# Patient Record
Sex: FEMALE | Race: WHITE | Employment: OTHER | URBAN - NONMETROPOLITAN AREA
[De-identification: names, ages, dates, MRNs, and addresses within clinical notes are randomized per-mention and may not be internally consistent; named-entity substitution may affect disease eponyms.]

---

## 2017-01-01 ENCOUNTER — OFFICE VISIT (OUTPATIENT)
Dept: FAMILY MEDICINE CLINIC | Age: 72
End: 2017-01-01
Payer: MEDICARE

## 2017-01-01 ENCOUNTER — TELEPHONE (OUTPATIENT)
Dept: FAMILY MEDICINE CLINIC | Age: 72
End: 2017-01-01

## 2017-01-01 ENCOUNTER — TELEPHONE (OUTPATIENT)
Dept: NEUROSURGERY | Age: 72
End: 2017-01-01

## 2017-01-01 VITALS
OXYGEN SATURATION: 98 % | BODY MASS INDEX: 35.36 KG/M2 | DIASTOLIC BLOOD PRESSURE: 66 MMHG | RESPIRATION RATE: 16 BRPM | HEART RATE: 78 BPM | SYSTOLIC BLOOD PRESSURE: 120 MMHG | HEIGHT: 66 IN | WEIGHT: 220 LBS | TEMPERATURE: 98.6 F

## 2017-01-01 VITALS
SYSTOLIC BLOOD PRESSURE: 134 MMHG | OXYGEN SATURATION: 97 % | HEART RATE: 70 BPM | RESPIRATION RATE: 16 BRPM | DIASTOLIC BLOOD PRESSURE: 60 MMHG | BODY MASS INDEX: 35.36 KG/M2 | HEIGHT: 66 IN | WEIGHT: 220 LBS | TEMPERATURE: 97.8 F

## 2017-01-01 VITALS
BODY MASS INDEX: 35.26 KG/M2 | SYSTOLIC BLOOD PRESSURE: 110 MMHG | RESPIRATION RATE: 16 BRPM | OXYGEN SATURATION: 98 % | HEIGHT: 66 IN | DIASTOLIC BLOOD PRESSURE: 64 MMHG | TEMPERATURE: 98.8 F | HEART RATE: 67 BPM | WEIGHT: 219.4 LBS

## 2017-01-01 VITALS
WEIGHT: 220 LBS | OXYGEN SATURATION: 98 % | DIASTOLIC BLOOD PRESSURE: 66 MMHG | SYSTOLIC BLOOD PRESSURE: 138 MMHG | HEART RATE: 70 BPM | RESPIRATION RATE: 16 BRPM | HEIGHT: 66 IN | BODY MASS INDEX: 35.36 KG/M2

## 2017-01-01 VITALS
HEART RATE: 69 BPM | TEMPERATURE: 98.7 F | RESPIRATION RATE: 16 BRPM | OXYGEN SATURATION: 97 % | HEIGHT: 66 IN | WEIGHT: 219 LBS | DIASTOLIC BLOOD PRESSURE: 74 MMHG | SYSTOLIC BLOOD PRESSURE: 132 MMHG | BODY MASS INDEX: 35.2 KG/M2

## 2017-01-01 VITALS
WEIGHT: 230 LBS | TEMPERATURE: 98 F | HEART RATE: 85 BPM | SYSTOLIC BLOOD PRESSURE: 124 MMHG | BODY MASS INDEX: 37.12 KG/M2 | DIASTOLIC BLOOD PRESSURE: 80 MMHG | OXYGEN SATURATION: 99 %

## 2017-01-01 DIAGNOSIS — E78.5 HYPERLIPIDEMIA, UNSPECIFIED HYPERLIPIDEMIA TYPE: ICD-10-CM

## 2017-01-01 DIAGNOSIS — F03.90 DEMENTIA WITHOUT BEHAVIORAL DISTURBANCE, UNSPECIFIED DEMENTIA TYPE: ICD-10-CM

## 2017-01-01 DIAGNOSIS — N30.00 ACUTE CYSTITIS WITHOUT HEMATURIA: Primary | ICD-10-CM

## 2017-01-01 DIAGNOSIS — F03.90 DEMENTIA WITHOUT BEHAVIORAL DISTURBANCE, UNSPECIFIED DEMENTIA TYPE: Primary | ICD-10-CM

## 2017-01-01 DIAGNOSIS — R41.0 CONFUSION: ICD-10-CM

## 2017-01-01 DIAGNOSIS — Z79.4 TYPE 2 DIABETES MELLITUS WITHOUT COMPLICATION, WITH LONG-TERM CURRENT USE OF INSULIN (HCC): ICD-10-CM

## 2017-01-01 DIAGNOSIS — R30.9 PAINFUL URINATION: Primary | ICD-10-CM

## 2017-01-01 DIAGNOSIS — R30.0 DYSURIA: ICD-10-CM

## 2017-01-01 DIAGNOSIS — G31.84 MILD COGNITIVE IMPAIRMENT WITH MEMORY LOSS: Primary | ICD-10-CM

## 2017-01-01 DIAGNOSIS — Z79.4 TYPE 2 DIABETES MELLITUS WITHOUT COMPLICATION, WITH LONG-TERM CURRENT USE OF INSULIN (HCC): Primary | ICD-10-CM

## 2017-01-01 DIAGNOSIS — F32.4 MAJOR DEPRESSIVE DISORDER WITH SINGLE EPISODE, IN PARTIAL REMISSION (HCC): ICD-10-CM

## 2017-01-01 DIAGNOSIS — E11.9 TYPE 2 DIABETES MELLITUS WITHOUT COMPLICATION, WITHOUT LONG-TERM CURRENT USE OF INSULIN (HCC): ICD-10-CM

## 2017-01-01 DIAGNOSIS — F32.A DEPRESSION, UNSPECIFIED DEPRESSION TYPE: ICD-10-CM

## 2017-01-01 DIAGNOSIS — R41.3 MEMORY LOSS: Primary | ICD-10-CM

## 2017-01-01 DIAGNOSIS — Z23 NEED FOR PROPHYLACTIC VACCINATION AGAINST STREPTOCOCCUS PNEUMONIAE (PNEUMOCOCCUS): ICD-10-CM

## 2017-01-01 DIAGNOSIS — N30.01 ACUTE CYSTITIS WITH HEMATURIA: Primary | ICD-10-CM

## 2017-01-01 DIAGNOSIS — E11.9 TYPE 2 DIABETES MELLITUS WITHOUT COMPLICATION, WITH LONG-TERM CURRENT USE OF INSULIN (HCC): ICD-10-CM

## 2017-01-01 DIAGNOSIS — N39.0 FREQUENT UTI: ICD-10-CM

## 2017-01-01 DIAGNOSIS — E11.9 TYPE 2 DIABETES MELLITUS WITHOUT COMPLICATION, WITH LONG-TERM CURRENT USE OF INSULIN (HCC): Primary | ICD-10-CM

## 2017-01-01 DIAGNOSIS — N30.00 ACUTE CYSTITIS WITHOUT HEMATURIA: ICD-10-CM

## 2017-01-01 DIAGNOSIS — I10 ESSENTIAL HYPERTENSION: ICD-10-CM

## 2017-01-01 LAB
ALBUMIN SERPL-MCNC: 3.8 G/DL (ref 3.5–5.2)
ALP BLD-CCNC: 144 U/L (ref 35–104)
ALT SERPL-CCNC: 13 U/L (ref 5–33)
ANION GAP SERPL CALCULATED.3IONS-SCNC: 14 MMOL/L (ref 7–19)
AST SERPL-CCNC: 33 U/L (ref 5–32)
BACTERIA: ABNORMAL /HPF
BASOPHILS ABSOLUTE: 0.1 K/UL (ref 0–0.2)
BASOPHILS RELATIVE PERCENT: 0.5 % (ref 0–1)
BILIRUB SERPL-MCNC: 0.3 MG/DL (ref 0.2–1.2)
BILIRUBIN URINE: NEGATIVE
BLOOD, URINE: ABNORMAL
BUN BLDV-MCNC: 27 MG/DL (ref 8–23)
CALCIUM SERPL-MCNC: 9.9 MG/DL (ref 8.8–10.2)
CHLORIDE BLD-SCNC: 98 MMOL/L (ref 98–111)
CHOLESTEROL, TOTAL: 253 MG/DL (ref 160–199)
CLARITY: ABNORMAL
CLARITY: ABNORMAL
CLARITY: CLEAR
CO2: 29 MMOL/L (ref 22–29)
COLOR: ABNORMAL
COLOR: YELLOW
CREAT SERPL-MCNC: 1.1 MG/DL (ref 0.5–0.9)
CREATININE URINE: 79.3 MG/DL (ref 4.2–622)
EOSINOPHILS ABSOLUTE: 0.4 K/UL (ref 0–0.6)
EOSINOPHILS RELATIVE PERCENT: 2.2 % (ref 0–5)
EPITHELIAL CELLS, UA: 1 /HPF (ref 0–5)
GFR NON-AFRICAN AMERICAN: 49
GLUCOSE BLD-MCNC: 266 MG/DL (ref 74–109)
GLUCOSE URINE: NEGATIVE MG/DL
HBA1C MFR BLD: 8.6 %
HCT VFR BLD CALC: 41.8 % (ref 37–47)
HDLC SERPL-MCNC: 41 MG/DL (ref 65–121)
HEMOGLOBIN: 12.4 G/DL (ref 12–16)
HYALINE CASTS: 10 /HPF (ref 0–8)
HYALINE CASTS: 3 /HPF (ref 0–8)
HYALINE CASTS: 4 /HPF (ref 0–8)
HYALINE CASTS: 5 /HPF (ref 0–8)
KETONES, URINE: NEGATIVE MG/DL
LDL CHOLESTEROL CALCULATED: 133 MG/DL
LEUKOCYTE ESTERASE, URINE: ABNORMAL
LYMPHOCYTES ABSOLUTE: 6.8 K/UL (ref 1.1–4.5)
LYMPHOCYTES RELATIVE PERCENT: 41.9 % (ref 20–40)
MCH RBC QN AUTO: 28.6 PG (ref 27–31)
MCHC RBC AUTO-ENTMCNC: 29.7 G/DL (ref 33–37)
MCV RBC AUTO: 96.3 FL (ref 81–99)
MICROALBUMIN UR-MCNC: 21.7 MG/DL (ref 0–19)
MICROALBUMIN/CREAT UR-RTO: 273.6 MG/G
MONOCYTES ABSOLUTE: 1.1 K/UL (ref 0–0.9)
MONOCYTES RELATIVE PERCENT: 6.6 % (ref 0–10)
NEUTROPHILS ABSOLUTE: 7.8 K/UL (ref 1.5–7.5)
NEUTROPHILS RELATIVE PERCENT: 47.9 % (ref 50–65)
NITRITE, URINE: POSITIVE
ORGANISM: ABNORMAL
PDW BLD-RTO: 15.2 % (ref 11.5–14.5)
PH UA: 5.5
PH UA: 5.5
PH UA: 6.5
PH UA: 6.5
PH UA: 7
PLATELET # BLD: 580 K/UL (ref 130–400)
PMV BLD AUTO: 11.4 FL (ref 9.4–12.3)
POTASSIUM SERPL-SCNC: 4.4 MMOL/L (ref 3.5–5)
PROTEIN UA: 100 MG/DL
PROTEIN UA: 30 MG/DL
PROTEIN UA: 30 MG/DL
PROTEIN UA: NEGATIVE MG/DL
PROTEIN UA: NEGATIVE MG/DL
RBC # BLD: 4.34 M/UL (ref 4.2–5.4)
RBC UA: 3 /HPF (ref 0–4)
SODIUM BLD-SCNC: 141 MMOL/L (ref 136–145)
SPECIFIC GRAVITY UA: 1.01
SPECIFIC GRAVITY UA: 1.02
SPECIFIC GRAVITY UA: 1.02
TOTAL PROTEIN: 7.6 G/DL (ref 6.6–8.7)
TRIGL SERPL-MCNC: 397 MG/DL (ref 0–149)
URINE CULTURE, ROUTINE: ABNORMAL
URINE TYPE: ABNORMAL
UROBILINOGEN, URINE: 0.2 E.U./DL
UROBILINOGEN, URINE: 1 E.U./DL
UROBILINOGEN, URINE: 1 E.U./DL
WBC # BLD: 16.2 K/UL (ref 4.8–10.8)
WBC UA: 20 /HPF (ref 0–5)
WBC UA: 204 /HPF (ref 0–5)
WBC UA: 76 /HPF (ref 0–5)
WBC UA: 94 /HPF (ref 0–5)

## 2017-01-01 PROCEDURE — G8417 CALC BMI ABV UP PARAM F/U: HCPCS | Performed by: FAMILY MEDICINE

## 2017-01-01 PROCEDURE — 1036F TOBACCO NON-USER: CPT | Performed by: FAMILY MEDICINE

## 2017-01-01 PROCEDURE — 1090F PRES/ABSN URINE INCON ASSESS: CPT | Performed by: FAMILY MEDICINE

## 2017-01-01 PROCEDURE — 1123F ACP DISCUSS/DSCN MKR DOCD: CPT | Performed by: FAMILY MEDICINE

## 2017-01-01 PROCEDURE — G8484 FLU IMMUNIZE NO ADMIN: HCPCS | Performed by: FAMILY MEDICINE

## 2017-01-01 PROCEDURE — 3045F PR MOST RECENT HEMOGLOBIN A1C LEVEL 7.0-9.0%: CPT | Performed by: FAMILY MEDICINE

## 2017-01-01 PROCEDURE — 3014F SCREEN MAMMO DOC REV: CPT | Performed by: FAMILY MEDICINE

## 2017-01-01 PROCEDURE — 99213 OFFICE O/P EST LOW 20 MIN: CPT | Performed by: FAMILY MEDICINE

## 2017-01-01 PROCEDURE — 99214 OFFICE O/P EST MOD 30 MIN: CPT | Performed by: FAMILY MEDICINE

## 2017-01-01 PROCEDURE — 99215 OFFICE O/P EST HI 40 MIN: CPT | Performed by: FAMILY MEDICINE

## 2017-01-01 PROCEDURE — G0009 ADMIN PNEUMOCOCCAL VACCINE: HCPCS | Performed by: FAMILY MEDICINE

## 2017-01-01 PROCEDURE — 3046F HEMOGLOBIN A1C LEVEL >9.0%: CPT | Performed by: FAMILY MEDICINE

## 2017-01-01 PROCEDURE — 3017F COLORECTAL CA SCREEN DOC REV: CPT | Performed by: FAMILY MEDICINE

## 2017-01-01 PROCEDURE — G8427 DOCREV CUR MEDS BY ELIG CLIN: HCPCS | Performed by: FAMILY MEDICINE

## 2017-01-01 PROCEDURE — G8400 PT W/DXA NO RESULTS DOC: HCPCS | Performed by: FAMILY MEDICINE

## 2017-01-01 PROCEDURE — 4040F PNEUMOC VAC/ADMIN/RCVD: CPT | Performed by: FAMILY MEDICINE

## 2017-01-01 PROCEDURE — 90732 PPSV23 VACC 2 YRS+ SUBQ/IM: CPT | Performed by: FAMILY MEDICINE

## 2017-01-01 RX ORDER — GRANULES FOR ORAL 3 G/1
3 POWDER ORAL ONCE
Qty: 1 EACH | Refills: 0 | Status: SHIPPED | OUTPATIENT
Start: 2017-01-01 | End: 2017-01-01

## 2017-01-01 RX ORDER — NITROFURANTOIN 25; 75 MG/1; MG/1
100 CAPSULE ORAL 2 TIMES DAILY
Qty: 20 CAPSULE | Refills: 0 | Status: SHIPPED | OUTPATIENT
Start: 2017-01-01 | End: 2017-01-01

## 2017-01-01 RX ORDER — NITROFURANTOIN 25; 75 MG/1; MG/1
100 CAPSULE ORAL 2 TIMES DAILY
Qty: 14 CAPSULE | Refills: 0 | Status: SHIPPED | OUTPATIENT
Start: 2017-01-01 | End: 2017-01-01

## 2017-01-01 RX ORDER — SIMVASTATIN 40 MG
40 TABLET ORAL DAILY
Qty: 90 TABLET | Refills: 1 | Status: SHIPPED | OUTPATIENT
Start: 2017-01-01 | End: 2018-01-01 | Stop reason: SDUPTHER

## 2017-01-01 RX ORDER — DONEPEZIL HYDROCHLORIDE 10 MG/1
10 TABLET, FILM COATED ORAL NIGHTLY
Qty: 90 TABLET | Refills: 1 | Status: SHIPPED | OUTPATIENT
Start: 2017-01-01

## 2017-01-01 RX ORDER — FENOFIBRIC ACID 135 MG/1
CAPSULE, DELAYED RELEASE ORAL
Refills: 2 | COMMUNITY
Start: 2017-01-01

## 2017-01-01 RX ORDER — MEMANTINE HYDROCHLORIDE 10 MG/1
TABLET ORAL
Qty: 60 TABLET | Refills: 2 | Status: SHIPPED | OUTPATIENT
Start: 2017-01-01 | End: 2018-01-01 | Stop reason: SDUPTHER

## 2017-01-01 RX ORDER — DONEPEZIL HYDROCHLORIDE 10 MG/1
10 TABLET, FILM COATED ORAL NIGHTLY
Qty: 30 TABLET | Refills: 2 | Status: SHIPPED | OUTPATIENT
Start: 2017-01-01 | End: 2017-01-01 | Stop reason: SDUPTHER

## 2017-01-01 RX ORDER — INSULIN GLARGINE 100 [IU]/ML
INJECTION, SOLUTION SUBCUTANEOUS
COMMUNITY
Start: 2017-01-01 | End: 2017-01-01 | Stop reason: SDUPTHER

## 2017-01-01 RX ORDER — CIPROFLOXACIN 250 MG/1
500 TABLET, FILM COATED ORAL 2 TIMES DAILY
Qty: 14 TABLET | Refills: 0 | Status: SHIPPED | OUTPATIENT
Start: 2017-01-01 | End: 2017-01-01

## 2017-01-01 RX ORDER — SIMVASTATIN 40 MG
40 TABLET ORAL DAILY
Qty: 30 TABLET | Refills: 2 | Status: SHIPPED | OUTPATIENT
Start: 2017-01-01 | End: 2017-01-01 | Stop reason: SDUPTHER

## 2017-01-01 RX ORDER — ESCITALOPRAM OXALATE 20 MG/1
20 TABLET ORAL DAILY
Qty: 30 TABLET | Refills: 2 | Status: SHIPPED | OUTPATIENT
Start: 2017-01-01 | End: 2018-01-01 | Stop reason: SDUPTHER

## 2017-01-01 RX ORDER — DONEPEZIL HYDROCHLORIDE 5 MG/1
5 TABLET, FILM COATED ORAL NIGHTLY
Qty: 30 TABLET | Refills: 2 | Status: SHIPPED | OUTPATIENT
Start: 2017-01-01 | End: 2017-01-01 | Stop reason: SDUPTHER

## 2017-01-01 RX ORDER — GRANULES FOR ORAL 3 G/1
3 POWDER ORAL ONCE
Qty: 1 EACH | Refills: 0 | Status: SHIPPED | OUTPATIENT
Start: 2017-01-01 | End: 2017-01-01 | Stop reason: SDUPTHER

## 2017-01-01 RX ORDER — ESCITALOPRAM OXALATE 10 MG/1
10 TABLET ORAL DAILY
COMMUNITY
End: 2017-01-01 | Stop reason: SDUPTHER

## 2017-01-01 RX ORDER — INSULIN GLARGINE 100 [IU]/ML
40 INJECTION, SOLUTION SUBCUTANEOUS NIGHTLY
Qty: 5 PEN | Refills: 2 | Status: SHIPPED | OUTPATIENT
Start: 2017-01-01 | End: 2018-01-01 | Stop reason: SDUPTHER

## 2017-01-01 ASSESSMENT — ENCOUNTER SYMPTOMS
COUGH: 0
CONSTIPATION: 0
VOMITING: 0
BACK PAIN: 0
WHEEZING: 0
WHEEZING: 0
EYE PAIN: 0
CONSTIPATION: 0
BACK PAIN: 0
EYE DISCHARGE: 0
ABDOMINAL PAIN: 0
RHINORRHEA: 0
SORE THROAT: 0
ABDOMINAL PAIN: 0
EYE DISCHARGE: 0
COUGH: 0
SHORTNESS OF BREATH: 0
EYE PAIN: 0
VOMITING: 0
EYE DISCHARGE: 0
NAUSEA: 0
RHINORRHEA: 0
VOMITING: 0
SORE THROAT: 0
NAUSEA: 0
NAUSEA: 0
DIARRHEA: 0
VOMITING: 0
WHEEZING: 0
EYE PAIN: 0
CONSTIPATION: 0
SHORTNESS OF BREATH: 0
RHINORRHEA: 0
WHEEZING: 0
RHINORRHEA: 0
NAUSEA: 0
SHORTNESS OF BREATH: 0
ABDOMINAL PAIN: 0
SHORTNESS OF BREATH: 0
SORE THROAT: 0
DIARRHEA: 0
DIARRHEA: 0
CONSTIPATION: 0
BACK PAIN: 0
VOMITING: 0
SHORTNESS OF BREATH: 0
COUGH: 0
RHINORRHEA: 0
ABDOMINAL PAIN: 0
ABDOMINAL PAIN: 0
EYE DISCHARGE: 0
SORE THROAT: 0
SHORTNESS OF BREATH: 0
EYE DISCHARGE: 0
EYE DISCHARGE: 0
RHINORRHEA: 0
BACK PAIN: 0
EYE PAIN: 0
COUGH: 0
DIARRHEA: 0
NAUSEA: 0
VOMITING: 0
DIARRHEA: 0
EYE PAIN: 0
CONSTIPATION: 0
CONSTIPATION: 0
SORE THROAT: 0
BACK PAIN: 0
EYE PAIN: 0
WHEEZING: 0
BACK PAIN: 0
SORE THROAT: 0
ABDOMINAL PAIN: 0
COUGH: 0
NAUSEA: 0
WHEEZING: 0
DIARRHEA: 0
COUGH: 0

## 2017-01-03 ENCOUNTER — HOSPITAL ENCOUNTER (OUTPATIENT)
Dept: NON INVASIVE DIAGNOSTICS | Age: 72
Discharge: HOME | End: 2017-01-03
Payer: MEDICARE

## 2017-01-03 ENCOUNTER — OFFICE VISIT (OUTPATIENT)
Dept: VASCULAR SURGERY | Age: 72
End: 2017-01-03
Payer: MEDICARE

## 2017-01-03 VITALS
BODY MASS INDEX: 36.96 KG/M2 | HEIGHT: 66 IN | DIASTOLIC BLOOD PRESSURE: 68 MMHG | WEIGHT: 230 LBS | RESPIRATION RATE: 18 BRPM | TEMPERATURE: 97.8 F | SYSTOLIC BLOOD PRESSURE: 128 MMHG | HEART RATE: 80 BPM

## 2017-01-03 DIAGNOSIS — M79.89 LEG SWELLING: ICD-10-CM

## 2017-01-03 DIAGNOSIS — I83.893 VARICOSE VEINS OF BOTH LEGS WITH EDEMA: Primary | ICD-10-CM

## 2017-01-03 DIAGNOSIS — M79.604 PAIN OF RIGHT LOWER EXTREMITY: Primary | ICD-10-CM

## 2017-01-03 DIAGNOSIS — M79.604 RIGHT LEG PAIN: ICD-10-CM

## 2017-01-03 PROCEDURE — 1036F TOBACCO NON-USER: CPT | Performed by: PHYSICIAN ASSISTANT

## 2017-01-03 PROCEDURE — 3017F COLORECTAL CA SCREEN DOC REV: CPT | Performed by: PHYSICIAN ASSISTANT

## 2017-01-03 PROCEDURE — 4040F PNEUMOC VAC/ADMIN/RCVD: CPT | Performed by: PHYSICIAN ASSISTANT

## 2017-01-03 PROCEDURE — 3014F SCREEN MAMMO DOC REV: CPT | Performed by: PHYSICIAN ASSISTANT

## 2017-01-03 PROCEDURE — G8427 DOCREV CUR MEDS BY ELIG CLIN: HCPCS | Performed by: PHYSICIAN ASSISTANT

## 2017-01-03 PROCEDURE — G8419 CALC BMI OUT NRM PARAM NOF/U: HCPCS | Performed by: PHYSICIAN ASSISTANT

## 2017-01-03 PROCEDURE — 99203 OFFICE O/P NEW LOW 30 MIN: CPT | Performed by: PHYSICIAN ASSISTANT

## 2017-01-03 PROCEDURE — 93971 EXTREMITY STUDY: CPT

## 2017-01-03 PROCEDURE — 1123F ACP DISCUSS/DSCN MKR DOCD: CPT | Performed by: PHYSICIAN ASSISTANT

## 2017-01-03 PROCEDURE — G8400 PT W/DXA NO RESULTS DOC: HCPCS | Performed by: PHYSICIAN ASSISTANT

## 2017-01-03 PROCEDURE — 1090F PRES/ABSN URINE INCON ASSESS: CPT | Performed by: PHYSICIAN ASSISTANT

## 2017-01-03 PROCEDURE — G8484 FLU IMMUNIZE NO ADMIN: HCPCS | Performed by: PHYSICIAN ASSISTANT

## 2017-01-03 RX ORDER — FUROSEMIDE 20 MG/1
20 TABLET ORAL DAILY
COMMUNITY
Start: 2016-12-29

## 2017-01-03 RX ORDER — METOPROLOL SUCCINATE 50 MG/1
50 TABLET, EXTENDED RELEASE ORAL DAILY
COMMUNITY

## 2017-01-03 RX ORDER — ESOMEPRAZOLE MAGNESIUM 20 MG/1
20 FOR SUSPENSION ORAL 2 TIMES DAILY
COMMUNITY

## 2017-01-03 RX ORDER — FENOFIBRATE 134 MG/1
134 CAPSULE ORAL NIGHTLY
COMMUNITY
End: 2017-01-01

## 2017-01-03 RX ORDER — LOSARTAN POTASSIUM 100 MG/1
100 TABLET ORAL DAILY
COMMUNITY

## 2017-01-03 RX ORDER — SIMVASTATIN 40 MG
40 TABLET ORAL DAILY
COMMUNITY
End: 2017-01-01 | Stop reason: ALTCHOICE

## 2017-01-03 RX ORDER — HYDROCODONE BITARTRATE AND ACETAMINOPHEN 10; 325 MG/1; MG/1
1 TABLET ORAL EVERY 8 HOURS PRN
COMMUNITY
End: 2017-01-01 | Stop reason: ALTCHOICE

## 2017-01-03 RX ORDER — HYDROCHLOROTHIAZIDE 12.5 MG/1
12.5 CAPSULE, GELATIN COATED ORAL DAILY
COMMUNITY

## 2017-02-23 ENCOUNTER — LAB (OUTPATIENT)
Dept: LAB | Facility: HOSPITAL | Age: 72
End: 2017-02-23

## 2017-02-23 ENCOUNTER — OFFICE VISIT (OUTPATIENT)
Dept: OTOLARYNGOLOGY | Facility: CLINIC | Age: 72
End: 2017-02-23

## 2017-02-23 VITALS
BODY MASS INDEX: 38.49 KG/M2 | WEIGHT: 231 LBS | TEMPERATURE: 98.3 F | DIASTOLIC BLOOD PRESSURE: 76 MMHG | HEART RATE: 75 BPM | SYSTOLIC BLOOD PRESSURE: 171 MMHG | HEIGHT: 65 IN

## 2017-02-23 DIAGNOSIS — E04.1 THYROID NODULE: Primary | ICD-10-CM

## 2017-02-23 DIAGNOSIS — E04.2 MULTIPLE THYROID NODULES: Primary | ICD-10-CM

## 2017-02-23 DIAGNOSIS — E04.1 THYROID NODULE: ICD-10-CM

## 2017-02-23 DIAGNOSIS — K21.9 LARYNGOPHARYNGEAL REFLUX (LPR): ICD-10-CM

## 2017-02-23 LAB
T3FREE SERPL-MCNC: 3.2 PG/ML (ref 2.77–5.27)
TSH SERPL DL<=0.05 MIU/L-ACNC: 2.06 MIU/ML (ref 0.47–4.68)

## 2017-02-23 PROCEDURE — 84443 ASSAY THYROID STIM HORMONE: CPT | Performed by: NURSE PRACTITIONER

## 2017-02-23 PROCEDURE — 84445 ASSAY OF TSI GLOBULIN: CPT | Performed by: NURSE PRACTITIONER

## 2017-02-23 PROCEDURE — 36415 COLL VENOUS BLD VENIPUNCTURE: CPT

## 2017-02-23 PROCEDURE — 84436 ASSAY OF TOTAL THYROXINE: CPT | Performed by: NURSE PRACTITIONER

## 2017-02-23 PROCEDURE — 99213 OFFICE O/P EST LOW 20 MIN: CPT | Performed by: NURSE PRACTITIONER

## 2017-02-23 PROCEDURE — 86376 MICROSOMAL ANTIBODY EACH: CPT | Performed by: NURSE PRACTITIONER

## 2017-02-23 PROCEDURE — 84481 FREE ASSAY (FT-3): CPT | Performed by: NURSE PRACTITIONER

## 2017-02-23 PROCEDURE — 76536 US EXAM OF HEAD AND NECK: CPT | Performed by: OTOLARYNGOLOGY

## 2017-02-23 RX ORDER — FUROSEMIDE 20 MG/1
TABLET ORAL
Status: ON HOLD | COMMUNITY
Start: 2016-12-29 | End: 2018-05-05

## 2017-02-23 RX ORDER — METOPROLOL SUCCINATE 50 MG/1
TABLET, EXTENDED RELEASE ORAL
Status: ON HOLD | COMMUNITY
End: 2018-05-05

## 2017-02-23 RX ORDER — SIMVASTATIN 40 MG
TABLET ORAL
COMMUNITY
End: 2017-08-23

## 2017-02-23 RX ORDER — INSULIN LISPRO 100 [IU]/ML
INJECTION, SOLUTION INTRAVENOUS; SUBCUTANEOUS
Refills: 6 | Status: ON HOLD | COMMUNITY
Start: 2017-02-02 | End: 2018-05-05

## 2017-02-23 RX ORDER — HYDROCHLOROTHIAZIDE 12.5 MG/1
CAPSULE, GELATIN COATED ORAL
Status: ON HOLD | COMMUNITY
End: 2018-05-05

## 2017-02-23 RX ORDER — LOSARTAN POTASSIUM 100 MG/1
TABLET ORAL
Status: ON HOLD | COMMUNITY
End: 2018-05-05

## 2017-02-23 RX ORDER — FENOFIBRATE 134 MG/1
CAPSULE ORAL
Status: ON HOLD | COMMUNITY
End: 2018-05-05

## 2017-02-23 RX ORDER — HYDROCODONE BITARTRATE AND ACETAMINOPHEN 10; 325 MG/1; MG/1
TABLET ORAL EVERY 8 HOURS
Status: ON HOLD | COMMUNITY
End: 2018-05-05

## 2017-02-23 RX ORDER — ESOMEPRAZOLE MAGNESIUM 20 MG/1
FOR SUSPENSION ORAL
Status: ON HOLD | COMMUNITY
End: 2018-05-05

## 2017-02-23 RX ORDER — INSULIN GLARGINE 100 [IU]/ML
INJECTION, SOLUTION SUBCUTANEOUS
Refills: 6 | Status: ON HOLD | COMMUNITY
Start: 2017-02-08 | End: 2018-05-05

## 2017-02-23 NOTE — PATIENT INSTRUCTIONS
The patient has a thyroid nodule. I explained the pathology of thyroid nodules including the risks of cancer. The options of surgery versus an observational course were discussed. Recommendation was made for a FINE NEEDLE ASPIRATION of the nodule/mass     Call for problems or worsening symptoms

## 2017-02-23 NOTE — PROGRESS NOTES
YOB: 1945  Location: [facility]  Location Address: [address] [place] [address], [facility], [address] [place] [address]  Location Phone: [phone]    Chief Complaint   Patient presents with   • Thyroid Problem       History of Present Illness  Bindu Fountain is a 71 y.o. female.  Bindu Fountain is here for follow up of ENT complaints. The patient has had problems with thyroid nodules  The symptoms are localized to the bilateral thyroid. The patient has had moderate symptoms. The symptoms have been present for the last several months . . The symptoms are aggravated by  no identifiable factors . The symptoms are improved by no identifieable factors.  Problems with swallowing have resolved. She did not have the thyroid bloodwork drawn             Past Medical History   Diagnosis Date   • Diabetes    • Dysphagia    • High cholesterol    • Hypertension    • Laryngopharyngeal reflux    • Lingual tonsil hypertrophy    • Thyroid nodule        Past Surgical History   Procedure Laterality Date   • Appendectomy     • Cholecystectomy     • Knee surgery     • Splenectomy     • Tonsillectomy           Current Outpatient Prescriptions:   •  esomeprazole (NexIUM) 20 MG packet, Take 20 mg by mouth 2 times daily, Disp: , Rfl:   •  fenofibrate micronized (LOFIBRA) 134 MG capsule, Take 134 mg by mouth nightly, Disp: , Rfl:   •  furosemide (LASIX) 20 MG tablet, 20 mg daily, Disp: , Rfl:   •  HUMALOG KWIKPEN 100 UNIT/ML solution pen-injector, INJECT 35 UNITS 3 TIMES A DAY AS DIRECTED, Disp: , Rfl: 6  •  hydrochlorothiazide (MICROZIDE) 12.5 MG capsule, Take 12.5 mg by mouth daily, Disp: , Rfl:   •  HYDROcodone-acetaminophen (NORCO)  MG per tablet, Every 8 (Eight) Hours., Disp: , Rfl:   •  LANTUS SOLOSTAR 100 UNIT/ML injection pen, INJECT 40 UNITS EVERY MORNING  UNITS EVERY EVENING, Disp: , Rfl: 6  •  losartan (COZAAR) 100 MG tablet, Take 100 mg by mouth daily, Disp: , Rfl:   •  MAGNESIUM OXIDE PO, Take 400 mg by mouth daily,  Disp: , Rfl:   •  metoprolol succinate XL (TOPROL-XL) 50 MG 24 hr tablet, Take 50 mg by mouth daily, Disp: , Rfl:   •  simvastatin (ZOCOR) 40 MG tablet, Take 40 mg by mouth daily, Disp: , Rfl:     Cephalexin; Oxycodone; Sulfa antibiotics; and Sulfamethoxazole-trimethoprim    Family History   Problem Relation Age of Onset   • Cancer Mother    • Heart disease Mother        Social History     Social History   • Marital status:      Spouse name: N/A   • Number of children: N/A   • Years of education: N/A     Occupational History   • Not on file.     Social History Main Topics   • Smoking status: Former Smoker   • Smokeless tobacco: Not on file      Comment: 2012   • Alcohol use No   • Drug use: Defer   • Sexual activity: Not on file     Other Topics Concern   • Not on file     Social History Narrative       Review of Systems   Constitutional: Negative.    HENT:        SEE HPI   Eyes: Negative.    Respiratory: Negative.    Cardiovascular: Negative.    Gastrointestinal: Negative.    Endocrine: Negative.    Genitourinary: Negative.    Musculoskeletal: Negative.    Skin: Negative.    Allergic/Immunologic: Negative.    Neurological: Negative.    Hematological: Negative.    Psychiatric/Behavioral: Negative.        Vitals:    02/23/17 1142   BP: 171/76   Pulse: 75   Temp: 98.3 °F (36.8 °C)       Objective     Physical Exam  CONSTITUTIONAL: well nourished, alert, oriented, in no acute distress     COMMUNICATION AND VOICE: able to communicate normally, normal voice quality    HEAD: normocephalic, no lesions, atraumatic, no tenderness, no masses     FACE: appearance normal, no lesions, no tenderness, no deformities, facial motion symmetric    SALIVARY GLANDS: parotid glands with no tenderness, no swelling, no masses, submandibular glands with normal size, nontender    EYES: ocular motility normal, eyelids normal, orbits normal, no proptosis, conjunctiva normal , pupils equal, round     EARS:  Hearing: response to  conversational voice normal bilaterally   External Ears: auricles without lesions  Otoscopic: tympanic membrane appearance normal, no lesions, no perforation, normal mobility, no fluid    NOSE:  External Nose: structure normal, no tenderness on palpation, no nasal discharge, no lesions, no evidence of trauma, nostrils patent   Intranasal Exam: nasal mucosa normal, vestibule within normal limits, inferior turbinate normal, nasal septum midline   Nasopharynx:     ORAL:  Lips: upper and lower lips without lesion   Teeth: dentition within normal limits for age   Gums: gingivae healthy   Oral Mucosa: oral mucosa normal, no mucosal lesions   Floor of Mouth: Warthin’s duct patent, mucosa normal  Tongue: lingual mucosa normal without lesions, normal tongue mobility   Palate: soft and hard palates with normal mucosa and structure  Oropharynx: oropharyngeal mucosa normal    NECK: enlarged neck habitus    THYROID: thyroid nodules bilaterally    LYMPH NODES: no lymphadenopathy    CHEST/RESPIRATORY: respiratory effort normal, ,mildly labored breathing  CARDIOVASCULAR:  extremities without cyanosis or edema      NEUROLOGIC/PSYCHIATRIC: oriented to time, place and person, mood normal, affect appropriate, CN II-XII intact grossly    Assessment/Plan   Bindu was seen today for thyroid problem.    Diagnoses and all orders for this visit:    Thyroid nodule  -     TSH; Future  -     Thyroid Peroxidase Antibody; Future  -     T4; Future  -     Thyroid Stimulating Immunoglobulin; Future  -     T3, Free; Future  -     US Thyroid; Future  -     US Guided Fine Needle Aspiration; Future    Laryngopharyngeal reflux (LPR)      * Surgery not found *  Orders Placed This Encounter   Procedures   • US Thyroid     Standing Status:   Future     Standing Expiration Date:   2/23/2019     Order Specific Question:   Reason for Exam:     Answer:   thyroid nodules   • US Guided Fine Needle Aspiration     Standing Status:   Future     Standing Expiration  Date:   2/23/2018     Order Specific Question:   Are labs needed on the specimens collected?     Answer:   Yes     Order Specific Question:   Which labs are required?     Answer:   Cytology for Malignant Cells (LAB13)     Order Specific Question:   Reason for Exam:     Answer:   bilateral dominant thyroid nodules   • TSH     Standing Status:   Future     Standing Expiration Date:   2/23/2018   • Thyroid Peroxidase Antibody     Standing Status:   Future     Standing Expiration Date:   2/23/2018   • T4     Standing Status:   Future     Standing Expiration Date:   2/23/2018   • Thyroid Stimulating Immunoglobulin     Standing Status:   Future     Standing Expiration Date:   2/23/2018   • T3, Free     Standing Status:   Future     Standing Expiration Date:   2/23/2018     Return in about 6 months (around 8/23/2017).       Patient Instructions   The patient has a thyroid nodule. I explained the pathology of thyroid nodules including the risks of cancer. The options of surgery versus an observational course were discussed. Recommendation was made for a FINE NEEDLE ASPIRATION of the nodule/mass     Call for problems or worsening symptoms

## 2017-02-24 LAB
T4 SERPL-MCNC: 7.4 UG/DL (ref 4.5–12)
THYROPEROXIDASE AB SERPL-ACNC: <6 IU/ML (ref 0–34)

## 2017-02-27 ENCOUNTER — OFFICE VISIT (OUTPATIENT)
Dept: ONCOLOGY | Facility: CLINIC | Age: 72
End: 2017-02-27

## 2017-02-27 VITALS
OXYGEN SATURATION: 92 % | HEIGHT: 65 IN | RESPIRATION RATE: 18 BRPM | HEART RATE: 85 BPM | BODY MASS INDEX: 38.45 KG/M2 | TEMPERATURE: 98.4 F | WEIGHT: 230.8 LBS | DIASTOLIC BLOOD PRESSURE: 84 MMHG | SYSTOLIC BLOOD PRESSURE: 144 MMHG

## 2017-02-27 DIAGNOSIS — D75.838 THROMBOCYTOSIS AFTER SPLENECTOMY: Primary | ICD-10-CM

## 2017-02-27 DIAGNOSIS — D47.3 THROMBOCYTHEMIA, ESSENTIAL (HCC): Primary | ICD-10-CM

## 2017-02-27 DIAGNOSIS — Z90.81 THROMBOCYTOSIS AFTER SPLENECTOMY: Primary | ICD-10-CM

## 2017-02-27 LAB
AUTO MIXED CELLS #: 1.1 10*3/UL (ref 0.1–1.5)
AUTO MIXED CELLS %: 7.7 % (ref 0.2–15.1)
ERYTHROCYTE [DISTWIDTH] IN BLOOD BY AUTOMATED COUNT: 16.6 % (ref 11.5–14.5)
HCT VFR BLD AUTO: 40.3 % (ref 37–47)
HGB BLD-MCNC: 12.3 G/DL (ref 12–16)
LYMPHOCYTES # BLD AUTO: 6.2 10*3/MM3 (ref 0.8–7)
LYMPHOCYTES NFR BLD AUTO: 41.4 % (ref 10–58.5)
MCH RBC QN AUTO: 29.2 PG (ref 27–31)
MCHC RBC AUTO-ENTMCNC: 30.5 G/DL (ref 33–37)
MCV RBC AUTO: 95.8 FL (ref 81–99)
NEUTROPHILS # BLD AUTO: 7.6 10*3/MM3 (ref 2–7.8)
NEUTROPHILS NFR BLD AUTO: 50.9 % (ref 37–92)
PLATELET # BLD AUTO: 558 10*3/MM3 (ref 130–400)
PMV BLD AUTO: 9.1 FL (ref 6–12)
RBC # BLD AUTO: 4.21 10*6/MM3 (ref 4.2–5.4)
WBC NRBC COR # BLD: 14.9 10*3/MM3 (ref 4.8–10.8)

## 2017-02-27 PROCEDURE — 99213 OFFICE O/P EST LOW 20 MIN: CPT | Performed by: INTERNAL MEDICINE

## 2017-02-27 PROCEDURE — 36415 COLL VENOUS BLD VENIPUNCTURE: CPT | Performed by: INTERNAL MEDICINE

## 2017-02-27 PROCEDURE — 85025 COMPLETE CBC W/AUTO DIFF WBC: CPT | Performed by: INTERNAL MEDICINE

## 2017-02-27 NOTE — PROGRESS NOTES
"[facility]ematology/Oncology    Bindu Fountain  [id number]  1945 2/27/2017      Subjective    6 month follow-up  There are no diagnoses linked to this encounter.  HISTORY OF PRESENT ILLNESS:   Hematology history significant for post splenectomy reactive mild cytosis and thrombocytosis.  Previous counts have been stable.  Her main chronic problem back pain for which on hydrocodone.  Use walker.      Past Medical History, Past Surgical History, Social History, Family History have been reviewed and are without significant change .    ROS:    A comprehensive 14 point review of systems performed and was negative chronic back pain syndrome and difficulty walking         Medications:  The current medication list was reviewed in the EMR    ALLERGIES:    Allergies   Allergen Reactions   • Cephalexin Other (See Comments)     Pt does not recall exact reaction   • Oxycodone Other (See Comments)     Caused pt to jerk uncontrollably.   • Sulfa Antibiotics Other (See Comments)     Pt does not recall exact reaction   • Sulfamethoxazole-Trimethoprim Other (See Comments)     Pt does not recall exact reaction       Objective      Vitals:    02/27/17 1402   BP: 144/84   Pulse: 85   Resp: 18   Temp: 98.4 °F (36.9 °C)   TempSrc: Tympanic   SpO2: 92%   Weight: 230 lb 12.8 oz (105 kg)   Height: 65\" (165.1 cm)           EXAM:  Gen: Cooperative, in no acute distress.  HEENT: No icterus or pallor, oral mucosa moist, PERRLA, EOMI  Neck: Supple, no JVD, no thyromegaly   Resp: CTA B/L no wheeze or crackles.  Abdomen: Soft, NT , ND.  EXT:  1+ bilateral  CNS: AAO x 3 , no focal deficit.  Skin: No rash        Assessment/Plan      Postsplenectomy reactive leukocytosis and thrombocytosis Ricardo 2 mutation negative.  Counts have been and remains stable.  Today's CBC is again showed mild leukocytosis and thrombocytosis.  Would be happy to see again if I can be of further help.  Follow-up with PCP on regular basis.        Danyelle" FRANCISCO Amaya MD    2/27/2017

## 2017-02-28 LAB — TSI ACT/NOR SER: 27 % (ref 0–139)

## 2017-04-11 ENCOUNTER — OFFICE VISIT (OUTPATIENT)
Dept: VASCULAR SURGERY | Age: 72
End: 2017-04-11
Payer: MEDICARE

## 2017-04-11 VITALS
DIASTOLIC BLOOD PRESSURE: 61 MMHG | TEMPERATURE: 96.7 F | HEART RATE: 76 BPM | SYSTOLIC BLOOD PRESSURE: 116 MMHG | RESPIRATION RATE: 18 BRPM

## 2017-04-11 DIAGNOSIS — M79.605 PAIN IN BOTH LOWER EXTREMITIES: Primary | ICD-10-CM

## 2017-04-11 DIAGNOSIS — M79.604 PAIN IN BOTH LOWER EXTREMITIES: Primary | ICD-10-CM

## 2017-04-11 DIAGNOSIS — M79.89 LEG SWELLING: ICD-10-CM

## 2017-04-11 PROCEDURE — G8427 DOCREV CUR MEDS BY ELIG CLIN: HCPCS | Performed by: PHYSICIAN ASSISTANT

## 2017-04-11 PROCEDURE — G8400 PT W/DXA NO RESULTS DOC: HCPCS | Performed by: PHYSICIAN ASSISTANT

## 2017-04-11 PROCEDURE — G8419 CALC BMI OUT NRM PARAM NOF/U: HCPCS | Performed by: PHYSICIAN ASSISTANT

## 2017-04-11 PROCEDURE — 3017F COLORECTAL CA SCREEN DOC REV: CPT | Performed by: PHYSICIAN ASSISTANT

## 2017-04-11 PROCEDURE — 4040F PNEUMOC VAC/ADMIN/RCVD: CPT | Performed by: PHYSICIAN ASSISTANT

## 2017-04-11 PROCEDURE — 99213 OFFICE O/P EST LOW 20 MIN: CPT | Performed by: PHYSICIAN ASSISTANT

## 2017-04-11 PROCEDURE — 1123F ACP DISCUSS/DSCN MKR DOCD: CPT | Performed by: PHYSICIAN ASSISTANT

## 2017-04-11 PROCEDURE — 1036F TOBACCO NON-USER: CPT | Performed by: PHYSICIAN ASSISTANT

## 2017-04-11 PROCEDURE — 3014F SCREEN MAMMO DOC REV: CPT | Performed by: PHYSICIAN ASSISTANT

## 2017-04-11 PROCEDURE — 1090F PRES/ABSN URINE INCON ASSESS: CPT | Performed by: PHYSICIAN ASSISTANT

## 2017-04-25 PROCEDURE — 88172 CYTP DX EVAL FNA 1ST EA SITE: CPT | Performed by: NURSE PRACTITIONER

## 2017-04-25 PROCEDURE — 88177 CYTP FNA EVAL EA ADDL: CPT | Performed by: NURSE PRACTITIONER

## 2017-04-25 PROCEDURE — 88334 PATH CONSLTJ SURG CYTO XM EA: CPT | Performed by: NURSE PRACTITIONER

## 2017-04-25 PROCEDURE — 88162 CYTOPATH SMEAR OTHER SOURCE: CPT | Performed by: NURSE PRACTITIONER

## 2017-04-25 PROCEDURE — 88173 CYTOPATH EVAL FNA REPORT: CPT | Performed by: NURSE PRACTITIONER

## 2017-05-02 DIAGNOSIS — E04.1 THYROID NODULE: ICD-10-CM

## 2017-05-15 ENCOUNTER — OFFICE VISIT (OUTPATIENT)
Dept: OTOLARYNGOLOGY | Facility: CLINIC | Age: 72
End: 2017-05-15

## 2017-05-15 VITALS
SYSTOLIC BLOOD PRESSURE: 139 MMHG | TEMPERATURE: 97.8 F | HEIGHT: 66 IN | WEIGHT: 228 LBS | BODY MASS INDEX: 36.64 KG/M2 | HEART RATE: 78 BPM | DIASTOLIC BLOOD PRESSURE: 64 MMHG

## 2017-05-15 DIAGNOSIS — E04.1 THYROID NODULE: Primary | ICD-10-CM

## 2017-05-15 DIAGNOSIS — E04.2 MULTIPLE THYROID NODULES: ICD-10-CM

## 2017-05-15 DIAGNOSIS — K21.9 LARYNGOPHARYNGEAL REFLUX (LPR): ICD-10-CM

## 2017-05-15 PROCEDURE — 99214 OFFICE O/P EST MOD 30 MIN: CPT | Performed by: NURSE PRACTITIONER

## 2017-08-23 ENCOUNTER — OFFICE VISIT (OUTPATIENT)
Dept: OTOLARYNGOLOGY | Facility: CLINIC | Age: 72
End: 2017-08-23

## 2017-08-23 VITALS
WEIGHT: 213.25 LBS | HEART RATE: 84 BPM | DIASTOLIC BLOOD PRESSURE: 72 MMHG | BODY MASS INDEX: 34.27 KG/M2 | TEMPERATURE: 98.4 F | SYSTOLIC BLOOD PRESSURE: 150 MMHG | HEIGHT: 66 IN

## 2017-08-23 DIAGNOSIS — E04.1 THYROID NODULE: Primary | ICD-10-CM

## 2017-08-23 DIAGNOSIS — K21.9 LARYNGOPHARYNGEAL REFLUX (LPR): ICD-10-CM

## 2017-08-23 PROCEDURE — 99214 OFFICE O/P EST MOD 30 MIN: CPT | Performed by: NURSE PRACTITIONER

## 2017-08-23 NOTE — PROGRESS NOTES
YOB: 1945  Location: [facility]  Location Address: [address] [place] [address],  [address], [address] [place] [address]  Location Phone: [phone]    Chief Complaint   Patient presents with   • Thyroid Problem       History of Present Illness  Bindu Fountain is a 72 y.o. female.  Bindu Fountain is here for follow up of ENT complaints. The patient has had problems with thyroid nodules  The symptoms are localized to the bilateral thyroid. The patient has had moderate symptoms. The symptoms have been present for the last several months . . The symptoms are aggravated by  no identifiable factors . The symptoms are improved by no identifieable factors.  Problems with swallowing have been intermittent but she is much improved.            Fine Needle Aspiration [[id number]] (Order [id number])   Pathology and Cytology   Date: 2017 Department: [facility] Ordering/Authorizing: JULI Alvarado   OR Specimen ID: [id number]  Specimen Description:    Fine Needle Aspiration   Order: [id number]   Status:  Final result   Visible to patient:  No (Not Released) Dx:  Multiple thyroid nodules      2wk ago     Final Diagnosis   1. Thyroid, left lobe, fine needle aspiration:  Soledad Category II Benign:     Consistent with a benign follicular nodule (includes adenomatoid nodule, colloid nodule, etc)     2. Thyroid, right lobe, fine needle aspiration:  Soledad Category II Benign:     Consistent with a benign follicular nodule (includes adenomatoid nodule, colloid nodule, etc)     1             Electronically signed by Adilson Melton MD on 2017 at 1430   Gross Description       1. Received in cytolyt in the laboratory in a container labeled with patient name and  designated as Left thyroid.     30 mls of needle washings.     6 FNA touch prep smears and 1 Thin prep slide made.     2. Received in cytolyt in the laboratory in a container labeled with patient name and  designated as Right thyroid.     30 mls of needle  washings.     4 FNA touch prep smears and 1 Thin prep slide made.       Intraoperative Consultation       1. FNA Left thyroid:     1. Abundant watery colloid. No follicular cells.  2. Watery colloid only.  3. Abundant watery colloid.     2. FNA Right thyroid.      1. Blood. A few lymphocytes.  2. Watery colloid. No follicular cells.     [facility]   Microscopic Description       1. Cytologic examination reveals scattered groups of benign follicular cells and a background of colloid and macrophages. No features of malignancy are seen.      2. Cytologic examination reveals scattered groups of benign follicular cells and a background of colloid and macrophages. No features of malignancy are seen.                           Past Medical History:   Diagnosis Date   • Diabetes    • Dysphagia    • High cholesterol    • Hypertension    • Laryngopharyngeal reflux    • Lingual tonsil hypertrophy    • Thyroid nodule        Past Surgical History:   Procedure Laterality Date   • APPENDECTOMY     • CHOLECYSTECTOMY     • FINE NEEDLE ASPIRATION     • KNEE SURGERY     • SPLENECTOMY     • TONSILLECTOMY           Current Outpatient Prescriptions:   •  esomeprazole (NexIUM) 20 MG packet, Take 20 mg by mouth 2 times daily, Disp: , Rfl:   •  fenofibrate micronized (LOFIBRA) 134 MG capsule, Take 134 mg by mouth nightly, Disp: , Rfl:   •  furosemide (LASIX) 20 MG tablet, 20 mg daily, Disp: , Rfl:   •  HUMALOG KWIKPEN 100 UNIT/ML solution pen-injector, INJECT 35 UNITS 3 TIMES A DAY AS DIRECTED, Disp: , Rfl: 6  •  hydrochlorothiazide (MICROZIDE) 12.5 MG capsule, Take 12.5 mg by mouth daily, Disp: , Rfl:   •  HYDROcodone-acetaminophen (NORCO)  MG per tablet, Every 8 (Eight) Hours., Disp: , Rfl:   •  LANTUS SOLOSTAR 100 UNIT/ML injection pen, INJECT 40 UNITS EVERY MORNING  UNITS EVERY EVENING, Disp: , Rfl: 6  •  losartan (COZAAR) 100 MG tablet, Take 100 mg by mouth daily, Disp: , Rfl:   •  magnesium oxide (MAGOX) 400 (241.3 MG) MG tablet  tablet, TAKE 1 TABLET BY MOUTH EVERY DAY, Disp: , Rfl: 2  •  metoprolol succinate XL (TOPROL-XL) 50 MG 24 hr tablet, Take 50 mg by mouth daily, Disp: , Rfl:     Cephalexin; Oxycodone; Sulfa antibiotics; and Sulfamethoxazole-trimethoprim    Family History   Problem Relation Age of Onset   • Cancer Mother    • Heart disease Mother        Social History     Social History   • Marital status:      Spouse name: N/A   • Number of children: N/A   • Years of education: N/A     Occupational History   • Not on file.     Social History Main Topics   • Smoking status: Former Smoker   • Smokeless tobacco: Not on file      Comment: 2012   • Alcohol use No   • Drug use: Defer   • Sexual activity: Not on file     Other Topics Concern   • Not on file     Social History Narrative       Review of Systems   Constitutional: Negative.    HENT:        SEE HPI   Eyes: Negative.    Respiratory: Negative.    Cardiovascular: Negative.    Gastrointestinal: Negative.    Endocrine: Negative.    Genitourinary: Negative.    Musculoskeletal: Negative.    Skin: Negative.    Allergic/Immunologic: Negative.    Neurological: Negative.    Hematological: Negative.    Psychiatric/Behavioral: Negative.        Vitals:    08/23/17 0951   BP: 150/72   Pulse: 84   Temp: 98.4 °F (36.9 °C)       Objective     Physical Exam  CONSTITUTIONAL: well nourished, alert, oriented, in no acute distress     COMMUNICATION AND VOICE: able to communicate normally, normal voice quality    HEAD: normocephalic, no lesions, atraumatic, no tenderness, no masses     FACE: appearance normal, no lesions, no tenderness, no deformities, facial motion symmetric    SALIVARY GLANDS: parotid glands with no tenderness, no swelling, no masses, submandibular glands with normal size, nontender    EYES: ocular motility normal, eyelids normal, orbits normal, no proptosis, conjunctiva normal , pupils equal, round     EARS:  Hearing: response to conversational voice normal bilaterally    External Ears: auricles without lesions  Otoscopic: tympanic membrane appearance normal, no lesions, no perforation, normal mobility, no fluid    NOSE:  External Nose: structure normal, no tenderness on palpation, no nasal discharge, no lesions, no evidence of trauma, nostrils patent   Intranasal Exam: nasal mucosa normal, vestibule within normal limits, inferior turbinate normal, nasal septum midline     ORAL:  Lips: upper and lower lips without lesion   Teeth: dentition within normal limits for age   Gums: gingivae healthy   Oral Mucosa: oral mucosa normal, no mucosal lesions   Floor of Mouth: Warthin’s duct patent, mucosa normal  Tongue: lingual mucosa normal without lesions, normal tongue mobility   Palate: soft and hard palates with normal mucosa and structure  Oropharynx: oropharyngeal mucosa normal    NECK: enlarged neck habitus    THYROID: thyroid nodules bilaterally    LYMPH NODES: no lymphadenopathy    CHEST/RESPIRATORY: respiratory effort normal    CARDIOVASCULAR:  extremities without cyanosis or edema      NEUROLOGIC/PSYCHIATRIC: oriented to time, place and person, mood normal, affect appropriate, CN II-XII intact grossly    Assessment/Plan   Bindu was seen today for thyroid problem.    Diagnoses and all orders for this visit:    Thyroid nodule  -     US Thyroid; Future    Laryngopharyngeal reflux (LPR)      * Surgery not found *  Orders Placed This Encounter   Procedures   • US Thyroid     Standing Status:   Future     Standing Expiration Date:   8/23/2020     Order Specific Question:   Reason for Exam:     Answer:   thyroid nodules     Return in about 1 year (around 8/23/2018).       Patient Instructions   The patient has a thyroid nodule, which is relatively small, and studies do not suggest a malignancy. I have recommended observation with follow-up with me for repeat ultrasound. I explained the pathology of thyroid nodules including the risks of cancer. The options of surgery were discussed, but we  will take an observational course for now.     Call for problems or worsening symptoms

## 2017-08-23 NOTE — PATIENT INSTRUCTIONS
The patient has a thyroid nodule, which is relatively small, and studies do not suggest a malignancy. I have recommended observation with follow-up with me for repeat ultrasound. I explained the pathology of thyroid nodules including the risks of cancer. The options of surgery were discussed, but we will take an observational course for now.     Call for problems or worsening symptoms

## 2017-09-14 PROBLEM — F03.90 DEMENTIA WITHOUT BEHAVIORAL DISTURBANCE (HCC): Status: ACTIVE | Noted: 2017-01-01

## 2017-09-14 PROBLEM — E78.5 HYPERLIPIDEMIA: Status: ACTIVE | Noted: 2017-01-01

## 2017-11-13 PROBLEM — I10 ESSENTIAL HYPERTENSION: Status: ACTIVE | Noted: 2017-01-01

## 2017-11-13 PROBLEM — E11.9 TYPE 2 DIABETES MELLITUS WITHOUT COMPLICATION, WITH LONG-TERM CURRENT USE OF INSULIN (HCC): Status: ACTIVE | Noted: 2017-01-01

## 2017-11-13 PROBLEM — Z79.4 TYPE 2 DIABETES MELLITUS WITHOUT COMPLICATION, WITH LONG-TERM CURRENT USE OF INSULIN (HCC): Status: ACTIVE | Noted: 2017-01-01

## 2017-11-13 NOTE — PATIENT INSTRUCTIONS
good, quick way to make sure that you have a balanced meal. It also helps you spread carbs throughout the day. ¨ Divide your plate by types of foods. Put non-starchy vegetables on half the plate, meat or other protein food on one-quarter of the plate, and a grain or starchy vegetable in the final quarter of the plate. To this you can add a small piece of fruit and 1 cup of milk or yogurt, depending on how many carbs you are supposed to eat at a meal.  · Try to eat about the same amount of carbs at each meal. Do not \"save up\" your daily allowance of carbs to eat at one meal.  · Proteins have very little or no carbs per serving. Examples of proteins are beef, chicken, turkey, fish, eggs, tofu, cheese, cottage cheese, and peanut butter. A serving size of meat is 3 ounces, which is about the size of a deck of cards. Examples of meat substitute serving sizes (equal to 1 ounce of meat) are 1/4 cup of cottage cheese, 1 egg, 1 tablespoon of peanut butter, and ½ cup of tofu. How can you eat out and still eat healthy? · Learn to estimate the serving sizes of foods that have carbohydrate. If you measure food at home, it will be easier to estimate the amount in a serving of restaurant food. · If the meal you order has too much carbohydrate (such as potatoes, corn, or baked beans), ask to have a low-carbohydrate food instead. Ask for a salad or green vegetables. · If you use insulin, check your blood sugar before and after eating out to help you plan how much to eat in the future. · If you eat more carbohydrate at a meal than you had planned, take a walk or do other exercise. This will help lower your blood sugar. What else should you know? · Limit saturated fat, such as the fat from meat and dairy products. This is a healthy choice because people who have diabetes are at higher risk of heart disease. So choose lean cuts of meat and nonfat or low-fat dairy products.  Use olive or canola oil instead of butter or shortening when cooking. · Don't skip meals. Your blood sugar may drop too low if you skip meals and take insulin or certain medicines for diabetes. · Check with your doctor before you drink alcohol. Alcohol can cause your blood sugar to drop too low. Alcohol can also cause a bad reaction if you take certain diabetes medicines. Follow-up care is a key part of your treatment and safety. Be sure to make and go to all appointments, and call your doctor if you are having problems. It's also a good idea to know your test results and keep a list of the medicines you take. Where can you learn more? Go to https://[employer]line [employer]pepiceweb.[employer]. org and sign in to your [employer] account. Enter C369 in the Mobile Patrol box to learn more about \"Learning About Diabetes Food Guidelines. \"     If you do not have an account, please click on the \"Sign Up Now\" link. Current as of: March 13, 2017  Content Version: 11.3  © 4844-2692 [employer]. Care instructions adapted under license by [facility] [place] ([facility]). If you have questions about a medical condition or this instruction, always ask your healthcare professional. [place] [address] any warranty or liability for your use of this information.

## 2017-11-13 NOTE — PROGRESS NOTES
that she got a pna vaccine from [address] [place] [address] without issues. She also reports that her blood pressure has been doing well. She denies any issues with the medicines and denies any symptoms from her high blood pressure. She is not getting any exercise in. Review of Systems   Constitutional: Negative for activity change, appetite change, fatigue and fever. HENT: Negative for congestion, rhinorrhea and sore throat. Eyes: Negative for pain and discharge. Respiratory: Negative for cough, shortness of breath and wheezing. Cardiovascular: Negative for chest pain and palpitations. Gastrointestinal: Negative for abdominal pain, constipation, diarrhea, nausea and vomiting. Endocrine: Negative for cold intolerance and heat intolerance. Genitourinary: Negative for dysuria and hematuria. Musculoskeletal: Negative for back pain, gait problem and neck pain. Skin: Negative for rash and wound. Neurological: Negative for syncope and weakness. Hematological: Negative for adenopathy. Does not bruise/bleed easily. Psychiatric/Behavioral: Negative for dysphoric mood and sleep disturbance. The patient is not nervous/anxious.         Past Medical History:   Diagnosis Date    Diabetes ([facility] [address]     type 2    History of blood clots     Hyperlipidemia     Hypertension     Osteoarthritis        Current Outpatient Prescriptions   Medication Sig Dispense Refill    fenofibric acid (FIBRICOR) 135 MG CPDR capsule TAKE 1 CAPSULE BY MOUTH EVERY NIGHT AT BEDTIME  2    donepezil (ARICEPT) 10 MG tablet Take 1 tablet by mouth nightly 30 tablet 2    LANTUS SOLOSTAR 100 UNIT/ML injection pen Inject 40 Units into the skin nightly 5 Pen 2    nitrofurantoin, macrocrystal-monohydrate, (MACROBID) 100 MG capsule Take 1 capsule by mouth 2 times daily for 7 days 14 capsule 0    simvastatin (ZOCOR) 40 MG tablet Take 1 tablet by mouth daily 30 tablet 2    escitalopram (LEXAPRO) 10 MG tablet discharge. Left eye exhibits no discharge. No scleral icterus. Neck: Normal range of motion. Neck supple. No tracheal deviation present. No thyromegaly present. Cardiovascular: Normal rate, regular rhythm, normal heart sounds and intact distal pulses. Exam reveals no gallop and no friction rub. No murmur heard. Pulmonary/Chest: Effort normal and breath sounds normal. No respiratory distress. She has no wheezes. She has no rales. Abdominal: Soft. Bowel sounds are normal. She exhibits no distension. There is no tenderness. Musculoskeletal: Normal range of motion. She exhibits no edema or deformity. Lymphadenopathy:     She has no cervical adenopathy. Neurological: She is alert and oriented to person, place, and time. No cranial nerve deficit. Skin: Skin is warm and dry. No rash noted. She is not diaphoretic. No erythema. Psychiatric: She has a normal mood and affect. Her behavior is normal. Thought content normal.   Does display forgetfulness during visit. Nursing note and vitals reviewed. Visual inspection:Compression stockings in place and removed for exam with some dry skin on heels and lateral feet. Deformity/amputation: absent  Skin lesions/pre-ulcerative calluses: absent  Edema: right- negative, left- negative    Sensory exam:  Monofilament sensation: abnormal - diminished in 3/5 spots on right foot. Left decreased on 1/5 spots. (minimum of 5 random plantar locations tested, avoiding callused areas - > 1 area with absence of sensation is + for neuropathy)    Plus at least one of the following:  Pulses: normal,   Pinprick: Intact  Proprioception: Intact  Vibration (128 Hz): N/A      Assessment:    ICD-10-CM ICD-9-CM    1.  Type 2 diabetes mellitus without complication, with long-term current use of insulin ([facility]) E11.9 250.00  DIABETES FOOT EXAM    Z79.4 V58.67 Hemoglobin A1C    Based on her blood sugars discussed increasing her night time lantus dose to 25 with plans for increasing

## 2017-11-13 NOTE — TELEPHONE ENCOUNTER
Patient's daughter, Johan Candelario, wanted to let us know (before patient comes for appointment today at 11:00, daughter doesn't want to address in front of her mom because mom has been getting \"very short\" about it) patient's memory has gotten worse in the last month-she forgets if she's eaten or taken medicine. Also, daughter says mother says she cannot go down steps, daughter thinks it is her brain not telling her feet to move.

## 2017-11-17 NOTE — TELEPHONE ENCOUNTER
Patient's daughter, Sheila Anand, states mother's memory has gotten so much worse in the last 3-4 days, she is not remembering to take her insulin (her sugar got up to 350), she forgets to eat, she forgets her grand kids' names. Daughter is asking if Dr. Hamlet Mcgowan thinks mother needs a referral to a neurologist? Daughter is aware Dr. Hamlet Mcgowan will be back in the office Monday morning at 7:00 am, he will get this message at that time, I will call her back Monday, and let her know Dr. Hector Zavala recommendation.

## 2017-11-20 NOTE — PROGRESS NOTES
After obtaining consent, and per orders of Dr. Dimas Tavarez, injection of PPSV23 given in Right deltoid by Bridgett Trujillo. Patient instructed to remain in clinic for 20 minutes afterwards, and to report any adverse reaction to me immediately.

## 2017-11-20 NOTE — PROGRESS NOTES
Sammi Caruso is a 67 y.o. female who presents today for   Chief Complaint   Patient presents with    Urinary Tract Infection     Patient states she still has the burning sensation and frequency going to the bathroom. She state it started getting alittle better while on her medicine but got bad again       HPI  Patient reports that she has not stopped having her dysuria and increased frequency since she was previously seen and treated with antibiotics. She denies any new symptoms but does report some fatigue. Her daughters have also contacted us previously this week and requested neurology referral due to increased confusion though patient continues to deny issues. She denies any fever or other sick symptoms. Discussed results from previous urinalysis and culture and sensitivities. Review of Systems   Constitutional: Negative for activity change, appetite change, fatigue and fever. HENT: Negative for congestion, rhinorrhea and sore throat. Eyes: Negative for pain and discharge. Respiratory: Negative for cough, shortness of breath and wheezing. Cardiovascular: Negative for chest pain and palpitations. Gastrointestinal: Negative for abdominal pain, constipation, diarrhea, nausea and vomiting. Endocrine: Negative for cold intolerance and heat intolerance. Genitourinary: Positive for dysuria, frequency and urgency. Negative for hematuria. Musculoskeletal: Negative for back pain, gait problem and neck pain. Skin: Negative for rash and wound. Neurological: Negative for syncope and weakness. Hematological: Negative for adenopathy. Does not bruise/bleed easily. Psychiatric/Behavioral: Negative for dysphoric mood and sleep disturbance. The patient is not nervous/anxious.         Past Medical History:   Diagnosis Date    Diabetes ([facility] [address]     type 2    History of blood clots     Hyperlipidemia     Hypertension     Osteoarthritis        Current Outpatient Prescriptions   Medication Sig Dispense 70   Temp 97.8 °F (36.6 °C) (Temporal)   Resp 16   Ht 5' 6\" (1.676 m)   Wt 220 lb (99.8 kg)   SpO2 97%   BMI 35.51 kg/m²     Physical Exam   Constitutional: She is oriented to person, place, and time. She appears well-developed and well-nourished. No distress. HENT:   Head: Normocephalic and atraumatic. Right Ear: External ear normal.   Left Ear: External ear normal.   Eyes: Conjunctivae and EOM are normal. Right eye exhibits no discharge. Left eye exhibits no discharge. No scleral icterus. Neck: Normal range of motion. Neck supple. Cardiovascular: Normal rate, regular rhythm, normal heart sounds and intact distal pulses. Exam reveals no gallop and no friction rub. No murmur heard. Pulmonary/Chest: Effort normal and breath sounds normal. No respiratory distress. She has no wheezes. She has no rales. Abdominal: Soft. Bowel sounds are normal. She exhibits no distension. There is no tenderness. Musculoskeletal: She exhibits no edema or deformity. Neurological: She is alert and oriented to person, place, and time. No cranial nerve deficit. Skin: Skin is warm and dry. No rash noted. She is not diaphoretic. No erythema. Psychiatric: She has a normal mood and affect. Her behavior is normal. Thought content normal.   Nursing note and vitals reviewed. Assessment:    ICD-10-CM ICD-9-CM    1. Acute cystitis without hematuria N30.00 595.0 ciprofloxacin (CIPRO) 250 MG tablet  Due to reported symptoms and no resolution and lack of tissue penetration by macrobid will treat with a different antibiotic. If symptoms continue will recheck urine for further work up. Based on allergies and reported reaction to keflex antibiotics are limited some.  She is unsure if she has ever had any other cephalosporins or if she can tolerate pcn.    2. Need for prophylactic vaccination against Streptococcus pneumoniae (pneumococcus) Z23 V03.82 Pneumococcal polysaccharide vaccine 23-valent PPSV23       Plan:  Discussed

## 2017-11-20 NOTE — TELEPHONE ENCOUNTER
If they are wanting to see neurology I am not oppose to a referral being placed. If they are wanting the referral and have no preference then I would recommend the bennyp at [facility] but Specifically have no preference for specific neurologist or NP. The diagnosis would be memory loss or dementia.

## 2017-11-21 NOTE — PATIENT INSTRUCTIONS
Patient Education        Urinary Tract Infection in Women: Care Instructions  Your Care Instructions    A urinary tract infection, or UTI, is a general term for an infection anywhere between the kidneys and the urethra (where urine comes out). Most UTIs are bladder infections. They often cause pain or burning when you urinate. UTIs are caused by bacteria and can be cured with antibiotics. Be sure to complete your treatment so that the infection goes away. Follow-up care is a key part of your treatment and safety. Be sure to make and go to all appointments, and call your doctor if you are having problems. It's also a good idea to know your test results and keep a list of the medicines you take. How can you care for yourself at home? · Take your antibiotics as directed. Do not stop taking them just because you feel better. You need to take the full course of antibiotics. · Drink extra water and other fluids for the next day or two. This may help wash out the bacteria that are causing the infection. (If you have kidney, heart, or liver disease and have to limit fluids, talk with your doctor before you increase your fluid intake.)  · Avoid drinks that are carbonated or have caffeine. They can irritate the bladder. · Urinate often. Try to empty your bladder each time. · To relieve pain, take a hot bath or lay a heating pad set on low over your lower belly or genital area. Never go to sleep with a heating pad in place. To prevent UTIs  · Drink plenty of water each day. This helps you urinate often, which clears bacteria from your system. (If you have kidney, heart, or liver disease and have to limit fluids, talk with your doctor before you increase your fluid intake.)  · Urinate when you need to. · Urinate right after you have sex. · Change sanitary pads often. · Avoid douches, bubble baths, feminine hygiene sprays, and other feminine hygiene products that have deodorants.   · After going to the bathroom, wipe from front to back. When should you call for help? Call your doctor now or seek immediate medical care if:  · Symptoms such as fever, chills, nausea, or vomiting get worse or appear for the first time. · You have new pain in your back just below your rib cage. This is called flank pain. · There is new blood or pus in your urine. · You have any problems with your antibiotic medicine. Watch closely for changes in your health, and be sure to contact your doctor if:  · You are not getting better after taking an antibiotic for 2 days. · Your symptoms go away but then come back. Where can you learn more? Go to [email or web address] org and sign in to your [employer] account. Enter G351 in the [employer] box to learn more about \"Urinary Tract Infection in Women: Care Instructions. \"     If you do not have an account, please click on the \"Sign Up Now\" link. Current as of: November 28, 2016  Content Version: 11.3  © 0912-3546 [employer]. Care instructions adapted under license by [facility] ([facility]). If you have questions about a medical condition or this instruction, always ask your healthcare professional. [place] [address] any warranty or liability for your use of this information.

## 2017-11-22 NOTE — TELEPHONE ENCOUNTER
LVM for pt daughter per pt request with all details for New Patient appt with Dr.Jim Jaden Martell;justin

## 2017-11-27 NOTE — PROGRESS NOTES
Tyrone Monreal is a 67 y.o. female who presents today for   Chief Complaint   Patient presents with    Depression     no energy,stays tired alot        HPI  Patient and family report that she has been having issues with her mood. They report that she has been feeling depressed recently. She has also not really had any energy which could be adding to her depression or vise versa. She denies any new problems. She was previously seen with issues with a UTI. The infection has been treated and she denies any problems with it at this time. She denies any new symptoms or concerns at this time. Daughters report that her blood sugars have been running better. They reports that the levels have been more in the upper 100's with high in the low 200's and she has not had any episodes of low blood sugars. Review of Systems   Constitutional: Positive for activity change and fatigue. Negative for appetite change and fever. HENT: Negative for congestion, rhinorrhea and sore throat. Eyes: Negative for pain and discharge. Respiratory: Negative for cough, shortness of breath and wheezing. Cardiovascular: Negative for chest pain and palpitations. Gastrointestinal: Negative for abdominal pain, constipation, diarrhea, nausea and vomiting. Endocrine: Negative for cold intolerance and heat intolerance. Genitourinary: Positive for dysuria and frequency. Negative for hematuria. Musculoskeletal: Negative for back pain, gait problem and neck pain. Skin: Negative for rash and wound. Neurological: Negative for syncope and weakness. Hematological: Negative for adenopathy. Does not bruise/bleed easily. Psychiatric/Behavioral: Positive for dysphoric mood. Negative for sleep disturbance. The patient is not nervous/anxious.         Past Medical History:   Diagnosis Date    Diabetes ([address]     type 2    History of blood clots     Hyperlipidemia     Hypertension     Osteoarthritis        Current Outpatient Prescriptions Medication Sig Dispense Refill    memantine (NAMENDA) 10 MG tablet 1/2 tab daily for 1 wk, then 1/2 tab bid for 1 wk, then 1 tab AM and 1/2 tab at PM for 1 wk, then 1 tablet twice daily. adjust as tolerated 60 tablet 2    escitalopram (LEXAPRO) 20 MG tablet Take 1 tablet by mouth daily 30 tablet 2    fosfomycin tromethamine (MONUROL) 3 g PACK Take 1 packet by mouth once for 1 dose 1 each 0    ciprofloxacin (CIPRO) 250 MG tablet Take 2 tablets by mouth 2 times daily for 7 days 14 tablet 0    fenofibric acid (FIBRICOR) 135 MG CPDR capsule TAKE 1 CAPSULE BY MOUTH EVERY NIGHT AT BEDTIME  2    donepezil (ARICEPT) 10 MG tablet Take 1 tablet by mouth nightly 30 tablet 2    LANTUS SOLOSTAR 100 UNIT/ML injection pen Inject 40 Units into the skin nightly 5 Pen 2    simvastatin (ZOCOR) 40 MG tablet Take 1 tablet by mouth daily 30 tablet 2    Insulin Lispro (HUMALOG KWIKPEN SC) Inject into the skin      esomeprazole Magnesium (NEXIUM) 20 MG PACK Take 20 mg by mouth 2 times daily      hydrochlorothiazide (MICROZIDE) 12.5 MG capsule Take 12.5 mg by mouth daily      losartan (COZAAR) 100 MG tablet Take 100 mg by mouth daily      metoprolol succinate (TOPROL XL) 50 MG extended release tablet Take 50 mg by mouth daily      Magnesium Oxide (MAG-OXIDE PO) Take 400 mg by mouth daily      furosemide (LASIX) 20 MG tablet 20 mg daily       No current facility-administered medications for this visit. Allergies   Allergen Reactions    Bactrim [Sulfamethoxazole-Trimethoprim] Other (See Comments)     Pt does not recall exact reaction      Keflex [Cephalexin] Other (See Comments)     Pt does not recall exact reaction    Oxycodone Other (See Comments)     Caused pt to jerk uncontrollably.     Sulfa Antibiotics Other (See Comments)     Pt does not recall exact reaction       Past Surgical History:   Procedure Laterality Date    CHOLECYSTECTOMY      KNEE SURGERY      SPLENECTOMY      TONSILLECTOMY         Social to clear her infections. 2. Depression, unspecified depression type F32.9 311 escitalopram (LEXAPRO) 20 MG tablet    Discussed dose adjustment and affects that mood can have on her memory as well as her energy level. Discussed side effects and proper use of medicine and monitoring. 3. Type 2 diabetes mellitus without complication, with long-term current use of insulin ([facility]) E11.9 250.00 Discussed effects that blood sugar can have on her mood, memory, and energy levels and discussed the importance of continuing to keep a good control of her levels with her diet and insulin. Z79.4 V58.67    4. Dementia without behavioral disturbance, unspecified dementia type F03.90 294.20 memantine (NAMENDA) 10 MG tablet    Family still raising concerns about decline and are wanting to start her on the other medicine to try to help. Discussed additional causes of memory problem exacerbations including the UTI and understanding voiced. Discussed proper use of medication. 5. Dysuria R30.0 788. 1 Urinalysis       Plan:  Discussed diagnosis, expected course, and proper use of medication  Discussed signs and symptoms requiring medical attention. All questions were answered and patient voiced understanding and agreement with plan as discussed. Orders Placed This Encounter   Procedures    Urinalysis     Standing Status:   Future     Number of Occurrences:   1     Standing Expiration Date:   2018     Orders Placed This Encounter   Medications    memantine (NAMENDA) 10 MG tablet     Si/2 tab daily for 1 wk, then 1/2 tab bid for 1 wk, then 1 tab AM and 1/2 tab at PM for 1 wk, then 1 tablet twice daily.  adjust as tolerated     Dispense:  60 tablet     Refill:  2    escitalopram (LEXAPRO) 20 MG tablet     Sig: Take 1 tablet by mouth daily     Dispense:  30 tablet     Refill:  2    fosfomycin tromethamine (MONUROL) 3 g PACK     Sig: Take 1 packet by mouth once for 1 dose     Dispense:  1 each     Refill:  0

## 2017-11-27 NOTE — PATIENT INSTRUCTIONS
Patient Education        Helping a Person With Alzheimer's Disease: Care Instructions  Your Care Instructions  Alzheimer's disease is a type of dementia. It affects memory, intelligence, judgment, language, and behavior. It is not clear what causes this disease. But it is the most common form of dementia in older adults. It may take many years to develop. Alzheimer's disease is different than mild memory loss that occurs with aging. Family members usually notice symptoms first. But the person also may realize that something is wrong. Follow-up care is a key part of your loved one's treatment and safety. Be sure to make and go to all appointments, and call your doctor if your loved one is having problems. It's also a good idea to know your loved one's test results and keep a list of the medicines he or she takes. How can you care for your loved one at home? · Develop a routine. The person will feel less frustrated or confused with a clear, simple daily plan. Remind him or her about important facts and events. · Be patient. It may take longer for the person to complete a task than it used to. · Help the person eat a balanced diet. Serve plenty of whole grains, fruits, and vegetables every day. If the person is not eating well at mealtimes, give snacks at midmorning and in the afternoon. Offer drinks such as Boost, Ensure, or Sustacal if he or she is losing weight. · Encourage exercise. Walking and other activity may slow the decline of mental ability. Help the person keep an active mind. Encourage hobbies such as reading and crossword puzzles. · Take steps to help if the person is sundowning. This is the restless behavior and trouble with sleeping that may occur in late afternoon and at night. Try not to let the person nap during the day. Offer a glass of warm milk or caffeine-free tea before bedtime. · Ask family members and friends for help.  You may need breaks where others can help care for the person. · Talk to the person's doctor about what resources are available for help in your area. · Review all of the person's medicines with his or her doctor. · For as long as the person is able, allow him or her to make decisions about activities, food, clothing, and other choices. Let the person be independent, even if tasks take more time or are not done perfectly. Tailor tasks to the person's abilities. For example, if cooking is no longer safe, ask for other help. He or she can help set the table or make simple dishes such as a salad. When the person needs help, offer it gently. Keeping safe  · Make your home (or the person's home) safe. Tack down rugs, and put no-slip tape in the tub. Install handrails, and put safety switches on stoves and appliances. Keep rooms free of clutter. Make sure walkways around furniture are clear. Do not move furniture around, because the person may become confused. · Use locks on doors and cupboards. Lock up knives, scissors, medicines, cleaning supplies, and other dangerous things. · Do not let the person drive or cook if he or she cannot do it safely. A person with Alzheimer's should not drive unless he or she is able to pass an on-road driving test. Your state 's license bureau can do a driving test if there is any question. · Get medical alert jewelry for the person so you can be contacted if he or she wanders away. If possible, provide a safe place for wandering, such as an enclosed yard or garden. When should you call for help? Call 911 anytime you think you may need emergency care. For example, call if:  · A person who has Alzheimer's disease has disappeared. · A person who has Alzheimer's disease is seriously injured. Call your doctor now or seek immediate medical care if:  · The person you are caring for suddenly sees or hears things that are not there (hallucinates).   · The person you are caring for has a sudden, drastic change in his or her

## 2017-12-07 NOTE — TELEPHONE ENCOUNTER
Patient's daughter is checking to see if Dr. Amadeo Nixon has got patient an appointment with neuro sooner that February?

## 2017-12-07 NOTE — TELEPHONE ENCOUNTER
Office makes appt themselves and are closed now. I will call Monday and see what I can do. Will let bessy know as well.

## 2017-12-12 NOTE — TELEPHONE ENCOUNTER
Called [facility] back and let her know I left a message with Dr Suzan Rivera office yesterday and again today to try and get appt sooner but am waiting on a call back. She said Char Burden (her mother) just seems to be getting worse fast and they arent sure if it is still UTI or if he memory is just slipping that fast. States they have appt here tomorrow. I let her know I would talk with Hugh Shook as well and see if anything needs changed before appt tomorrow.

## 2017-12-13 NOTE — PROGRESS NOTES
Duy Tamayo is a 67 y.o. female who presents today for   Chief Complaint   Patient presents with    Dementia     Cant get into Dr Keisha Ac until Feb    Urinary Tract Infection     Thinks she may still have it due to frequency and painful urination    Depression     seems to be doing okay       HPI  Patient presents with 2 of her daughters that are still reporting forgetfulness and concerns of her memory. It has been addressed several times that based on her symptoms and progression that it appears to be dementia possibly from alzheimers and she has been placed on medicines that she is tolerating. Daughters do report that some of her issues might possibly be a little better but by in large she is still declining. She has previously been referred to neurology for a second opinion but she couldn't get in until Feb.     They are also concerned that she might have another UTI. Patient denies any symptoms and no frequency or urgency or any other symptoms of UTI like previously. Her daughters are now reporting that her mood seems to be doing much better now. They feel like the lexapro is helping and doing a good job. She denies any issues with the medicine and reports that she is doing well with it. She has DM 2 that is being managed with insulin which has been fairly uncontrolled previously due to her noncompliance with diet and medication use. Adjustments to her dosing have been made over the last few visits with her now taking 40 units of lantus BID and 28 units of humalog with meals. She does not typically eat lunch which has resulted in her not getting any type of short acting in the middle of the day though she is eating some snacks during the day and it appears that her blood sugar is increasing over the day. She was also snacking at night after checking her blood sugar which caused the appearance of increasing in her fasting blood sugar overnight.  She has started testing her blood sugar after she eats and Ear: External ear normal.   Eyes: Conjunctivae and EOM are normal. Right eye exhibits no discharge. Left eye exhibits no discharge. No scleral icterus. Neck: Normal range of motion. Neck supple. Cardiovascular: Normal rate, regular rhythm, normal heart sounds and intact distal pulses. Exam reveals no gallop and no friction rub. No murmur heard. Pulmonary/Chest: Effort normal and breath sounds normal. No respiratory distress. She has no wheezes. She has no rales. Abdominal: Soft. Bowel sounds are normal. She exhibits no distension. There is no tenderness. Musculoskeletal: Normal range of motion. She exhibits no edema or deformity. Neurological: She is alert. No cranial nerve deficit. Skin: Skin is warm and dry. No rash noted. She is not diaphoretic. No erythema. Psychiatric: She has a normal mood and affect. Her behavior is normal. Thought content normal.   Nursing note and vitals reviewed. Assessment:    ICD-10-CM ICD-9-CM    1. Dementia without behavioral disturbance, unspecified dementia type F03.90 294.20 Has neuro eval in Feb. Doing well with aricept and namenda. Will continue to monitor and adjust as indicated   2. Type 2 diabetes mellitus without complication, with long-term current use of insulin ([facility]) E11.9 250.00 Reviewed blood sugar logs. With trend of numbers continued to stress importance of diet and will watch at current doses for a longer course with the addition of a sliding scale provided for day time when she does not eat a meal which is essentially lunch. Based on her reading this addition might be enough to achieve better control. Much insulin increase beyond that at this point increases her risk for low blood sugars and depending on her A1C at follow up and log her A1C goal may realistically be below 8.0.    Z79.4 V58.67    3. Major depressive disorder with single episode, in partial remission [facility] F32.4 296.25 Improved with lexapro.  Will continue to monitor and continue to adjust as indicated. 4. Frequent UTI N39.0 599.0 Urinalysis  Without symptoms and families concern discussed waiting for cultures and sensitivities prior to treatment. Patient is going to call if she starts developing symptoms though. Plan:  Discussed proper use of medication  Discussed signs and symptoms requiring medical attention. All questions were answered and patient/daughters voiced understanding and agreement with plan as discussed. Orders Placed This Encounter   Procedures    Urine Culture    Urinalysis    Microscopic Urinalysis     No orders of the defined types were placed in this encounter. There are no discontinued medications. Patient Instructions   Patient given educational handouts and has had all questions answered. Patient voices understanding and agrees to plans along with risks and benefits of plan. Patient is instructed to continue prior meds, diet, and exercise plans as instructed. Patient agrees to follow up as instructed and sooner if needed. Patient agrees to go to ER if condition becomes emergent. Return in about 2 months (around 2/13/2018).

## 2017-12-13 NOTE — PATIENT INSTRUCTIONS
Patient Education        Helping A Person With Dementia: Care Instructions  Your Care Instructions    Dementia is a loss of mental skills that affects daily life. It is different from mild memory loss that occurs with aging. Dementia can cause problems with memory, thinking clearly, and planning. It is different for everyone. But it usually gets worse slowly. Some people who have dementia can function well for a long time. But at some point it may become hard for the person to care for himself or herself. It can be upsetting to learn that a loved one has this condition. You may be afraid and worried about what will happen. You may wonder how you will care for the person. There is no cure for dementia. But medicine may be able to slow memory loss and improve thinking for a while. Other medicines may help with sleep, depression, and behavior changes. Dementia is different for everyone. In some cases, people can function well for a long time. You can help your loved one by making his or her home life easier and safer. You also need to take care of yourself. Caregiving can be stressful. But support is available to help you and give you a break when you need it. The Alzheimer's [employer] offers good information and support. If you are caring for someone with dementia, you can help make life safer and more comfortable. You can also help your loved one make decisions about future care. You may also want to bring up legal and financial issues. These are hard but important conversations to have. Follow-up care is a key part of your loved one's treatment and safety. Be sure to make and go to all appointments, and call your doctor if your loved one is having problems. It's also a good idea to know your loved one's test results and keep a list of the medicines he or she takes. How can you care for your loved one at home?   Taking care of the person  · If the person takes medicine for dementia, help him or her take it to learn more about \"Helping A Person With Dementia: Care Instructions. \"     If you do not have an account, please click on the \"Sign Up Now\" link. Current as of: May 12, 2017  Content Version: 11.4  © 9096-5145 [employer]. Care instructions adapted under license by [facility] ([facility]). If you have questions about a medical condition or this instruction, always ask your healthcare professional. [address] any warranty or liability for your use of this information.

## 2017-12-18 NOTE — TELEPHONE ENCOUNTER
They may use the sliding scale that was given at the previous visit that they may fallow on giving her additional insulin to bring down her sugar level. So if it is high they can do the sliding scale in addition to her Humalog with meals to bring the number down and simply use the sliding scale when she does not eat to help bring the number down. The numbers are inconsistent with previous log so it would suggest that the change is more so diet related.

## 2017-12-18 NOTE — TELEPHONE ENCOUNTER
Ceferino Luong, patient's daughter, is concerned and wanted to let Dr. Hamlet Mcgowan know her mom's blood sugar was 353 last night, 300 this morning. Today patient ate chicken sandwich for lunch and after lunch blood sugar was 348, daughter gave her 28 units of humalog, and 1 hour later it was 362.

## 2017-12-19 NOTE — TELEPHONE ENCOUNTER
Patient traditionally uses Humalog in a dose per meal setting. She takes 28 units with each meal. However she was run into an issue where she was not eating lunch and only snacking during lunch. This resulted in her blood sugar elevating during that time period and no short-acting insulin was being given. So at her last visit with her daughters they were provided with a sliding scale handout to allow them to properly give a dose based on her blood sugar around the time of lunch. If her blood sugars are running high currently they can use the same sliding scale based on her blood sugar and improve her numbers.

## 2018-01-01 ENCOUNTER — TELEPHONE (OUTPATIENT)
Dept: FAMILY MEDICINE CLINIC | Age: 73
End: 2018-01-01

## 2018-01-01 ENCOUNTER — HOSPITAL ENCOUNTER (OUTPATIENT)
Dept: MRI IMAGING | Age: 73
Discharge: HOME | End: 2018-03-12
Payer: MEDICARE

## 2018-01-01 ENCOUNTER — TELEPHONE (OUTPATIENT)
Dept: NEUROSURGERY | Age: 73
End: 2018-01-01

## 2018-01-01 ENCOUNTER — APPOINTMENT (OUTPATIENT)
Dept: WOMENS IMAGING | Age: 73
End: 2018-01-01
Payer: MEDICARE

## 2018-01-01 ENCOUNTER — OFFICE VISIT (OUTPATIENT)
Dept: FAMILY MEDICINE CLINIC | Age: 73
End: 2018-01-01
Payer: MEDICARE

## 2018-01-01 ENCOUNTER — HOSPITAL ENCOUNTER (OUTPATIENT)
Dept: WOMENS IMAGING | Age: 73
Discharge: HOME | End: 2018-03-08
Payer: MEDICARE

## 2018-01-01 ENCOUNTER — NURSE ONLY (OUTPATIENT)
Dept: FAMILY MEDICINE CLINIC | Age: 73
End: 2018-01-01
Payer: MEDICARE

## 2018-01-01 ENCOUNTER — OFFICE VISIT (OUTPATIENT)
Dept: NEUROSURGERY | Age: 73
End: 2018-01-01
Payer: MEDICARE

## 2018-01-01 ENCOUNTER — HOSPITAL ENCOUNTER (OUTPATIENT)
Dept: WOMENS IMAGING | Age: 73
Discharge: HOME | End: 2018-03-05
Payer: MEDICARE

## 2018-01-01 ENCOUNTER — TELEPHONE (OUTPATIENT)
Dept: NEUROLOGY | Age: 73
End: 2018-01-01

## 2018-01-01 ENCOUNTER — TELEPHONE (OUTPATIENT)
Dept: INTERNAL MEDICINE CLINIC | Age: 73
End: 2018-01-01

## 2018-01-01 ENCOUNTER — TELEPHONE (OUTPATIENT)
Dept: WOMENS IMAGING | Age: 73
End: 2018-01-01

## 2018-01-01 VITALS
HEIGHT: 66 IN | DIASTOLIC BLOOD PRESSURE: 72 MMHG | OXYGEN SATURATION: 97 % | SYSTOLIC BLOOD PRESSURE: 124 MMHG | BODY MASS INDEX: 34.87 KG/M2 | WEIGHT: 217 LBS | HEART RATE: 67 BPM | TEMPERATURE: 98.4 F | RESPIRATION RATE: 16 BRPM

## 2018-01-01 VITALS
RESPIRATION RATE: 16 BRPM | HEIGHT: 66 IN | OXYGEN SATURATION: 97 % | TEMPERATURE: 97.3 F | SYSTOLIC BLOOD PRESSURE: 130 MMHG | WEIGHT: 216.8 LBS | BODY MASS INDEX: 34.84 KG/M2 | HEART RATE: 69 BPM | DIASTOLIC BLOOD PRESSURE: 70 MMHG

## 2018-01-01 VITALS
HEART RATE: 57 BPM | HEIGHT: 66 IN | TEMPERATURE: 97.9 F | OXYGEN SATURATION: 97 % | SYSTOLIC BLOOD PRESSURE: 134 MMHG | RESPIRATION RATE: 20 BRPM | BODY MASS INDEX: 34.01 KG/M2 | DIASTOLIC BLOOD PRESSURE: 60 MMHG | WEIGHT: 211.6 LBS

## 2018-01-01 VITALS
DIASTOLIC BLOOD PRESSURE: 72 MMHG | HEIGHT: 65 IN | SYSTOLIC BLOOD PRESSURE: 130 MMHG | WEIGHT: 219 LBS | HEART RATE: 67 BPM | BODY MASS INDEX: 36.49 KG/M2 | OXYGEN SATURATION: 95 %

## 2018-01-01 VITALS
HEIGHT: 66 IN | RESPIRATION RATE: 16 BRPM | TEMPERATURE: 97.7 F | WEIGHT: 219.8 LBS | DIASTOLIC BLOOD PRESSURE: 68 MMHG | HEART RATE: 67 BPM | SYSTOLIC BLOOD PRESSURE: 130 MMHG | OXYGEN SATURATION: 98 % | BODY MASS INDEX: 35.32 KG/M2

## 2018-01-01 VITALS
WEIGHT: 220 LBS | BODY MASS INDEX: 36.65 KG/M2 | SYSTOLIC BLOOD PRESSURE: 132 MMHG | HEIGHT: 65 IN | DIASTOLIC BLOOD PRESSURE: 59 MMHG | HEART RATE: 66 BPM

## 2018-01-01 VITALS
SYSTOLIC BLOOD PRESSURE: 124 MMHG | TEMPERATURE: 97.8 F | HEART RATE: 51 BPM | DIASTOLIC BLOOD PRESSURE: 70 MMHG | OXYGEN SATURATION: 97 %

## 2018-01-01 DIAGNOSIS — E53.8 VITAMIN B12 DEFICIENCY: ICD-10-CM

## 2018-01-01 DIAGNOSIS — Z74.09 DECREASED AMBULATION STATUS: ICD-10-CM

## 2018-01-01 DIAGNOSIS — N39.0 RECURRENT UTI: ICD-10-CM

## 2018-01-01 DIAGNOSIS — R53.83 FATIGUE, UNSPECIFIED TYPE: ICD-10-CM

## 2018-01-01 DIAGNOSIS — N18.30 STAGE 3 CHRONIC KIDNEY DISEASE (HCC): ICD-10-CM

## 2018-01-01 DIAGNOSIS — N39.0 URINARY TRACT INFECTION WITHOUT HEMATURIA, SITE UNSPECIFIED: ICD-10-CM

## 2018-01-01 DIAGNOSIS — F32.A DEPRESSION, UNSPECIFIED DEPRESSION TYPE: ICD-10-CM

## 2018-01-01 DIAGNOSIS — Z12.39 SCREENING FOR MALIGNANT NEOPLASM OF BREAST: ICD-10-CM

## 2018-01-01 DIAGNOSIS — E11.22 TYPE 2 DIABETES MELLITUS WITH STAGE 3 CHRONIC KIDNEY DISEASE, WITH LONG-TERM CURRENT USE OF INSULIN (HCC): Primary | ICD-10-CM

## 2018-01-01 DIAGNOSIS — R26.9 ABNORMAL GAIT: ICD-10-CM

## 2018-01-01 DIAGNOSIS — E11.9 TYPE 2 DIABETES MELLITUS WITHOUT COMPLICATION, WITH LONG-TERM CURRENT USE OF INSULIN (HCC): Primary | ICD-10-CM

## 2018-01-01 DIAGNOSIS — R92.8 ABNORMAL MAMMOGRAM OF RIGHT BREAST: ICD-10-CM

## 2018-01-01 DIAGNOSIS — M25.611 DECREASED RIGHT SHOULDER RANGE OF MOTION: ICD-10-CM

## 2018-01-01 DIAGNOSIS — R53.1 WEAKNESS: ICD-10-CM

## 2018-01-01 DIAGNOSIS — K64.8 INTERNAL HEMORRHOID: ICD-10-CM

## 2018-01-01 DIAGNOSIS — N18.30 TYPE 2 DIABETES MELLITUS WITH STAGE 3 CHRONIC KIDNEY DISEASE, WITH LONG-TERM CURRENT USE OF INSULIN (HCC): Primary | ICD-10-CM

## 2018-01-01 DIAGNOSIS — R92.8 ABNORMAL MAMMOGRAM OF RIGHT BREAST: Primary | ICD-10-CM

## 2018-01-01 DIAGNOSIS — R41.3 MEMORY LOSS: ICD-10-CM

## 2018-01-01 DIAGNOSIS — Z87.440 HISTORY OF RECURRENT UTIS: ICD-10-CM

## 2018-01-01 DIAGNOSIS — G30.9 ALZHEIMER'S DEMENTIA WITHOUT BEHAVIORAL DISTURBANCE, UNSPECIFIED TIMING OF DEMENTIA ONSET: ICD-10-CM

## 2018-01-01 DIAGNOSIS — F03.90 DEMENTIA WITHOUT BEHAVIORAL DISTURBANCE, UNSPECIFIED DEMENTIA TYPE: ICD-10-CM

## 2018-01-01 DIAGNOSIS — E11.9 TYPE 2 DIABETES MELLITUS WITHOUT COMPLICATION, WITHOUT LONG-TERM CURRENT USE OF INSULIN (HCC): Primary | ICD-10-CM

## 2018-01-01 DIAGNOSIS — E11.22 TYPE 2 DIABETES MELLITUS WITH STAGE 3 CHRONIC KIDNEY DISEASE, WITH LONG-TERM CURRENT USE OF INSULIN (HCC): ICD-10-CM

## 2018-01-01 DIAGNOSIS — E53.8 B12 DEFICIENCY: Primary | ICD-10-CM

## 2018-01-01 DIAGNOSIS — N18.30 TYPE 2 DIABETES MELLITUS WITH STAGE 3 CHRONIC KIDNEY DISEASE, WITH LONG-TERM CURRENT USE OF INSULIN (HCC): ICD-10-CM

## 2018-01-01 DIAGNOSIS — Z12.11 SCREENING FOR MALIGNANT NEOPLASM OF COLON: ICD-10-CM

## 2018-01-01 DIAGNOSIS — K62.5 BLOOD PER RECTUM: ICD-10-CM

## 2018-01-01 DIAGNOSIS — F02.80 ALZHEIMER'S DEMENTIA WITHOUT BEHAVIORAL DISTURBANCE, UNSPECIFIED TIMING OF DEMENTIA ONSET: ICD-10-CM

## 2018-01-01 DIAGNOSIS — E78.5 HYPERLIPIDEMIA, UNSPECIFIED HYPERLIPIDEMIA TYPE: ICD-10-CM

## 2018-01-01 DIAGNOSIS — N30.20 CHRONIC CYSTITIS: ICD-10-CM

## 2018-01-01 DIAGNOSIS — N18.30 CKD (CHRONIC KIDNEY DISEASE) STAGE 3, GFR 30-59 ML/MIN (HCC): ICD-10-CM

## 2018-01-01 DIAGNOSIS — E83.52 HYPERCALCEMIA: ICD-10-CM

## 2018-01-01 DIAGNOSIS — Z79.4 TYPE 2 DIABETES MELLITUS WITHOUT COMPLICATION, WITH LONG-TERM CURRENT USE OF INSULIN (HCC): Primary | ICD-10-CM

## 2018-01-01 DIAGNOSIS — N30.00 ACUTE CYSTITIS WITHOUT HEMATURIA: Primary | ICD-10-CM

## 2018-01-01 DIAGNOSIS — E87.8 ELECTROLYTE ABNORMALITY: ICD-10-CM

## 2018-01-01 DIAGNOSIS — Z79.4 TYPE 2 DIABETES MELLITUS WITH STAGE 3 CHRONIC KIDNEY DISEASE, WITH LONG-TERM CURRENT USE OF INSULIN (HCC): Primary | ICD-10-CM

## 2018-01-01 DIAGNOSIS — N39.0 URINARY TRACT INFECTION WITHOUT HEMATURIA, SITE UNSPECIFIED: Primary | ICD-10-CM

## 2018-01-01 DIAGNOSIS — R92.8 ABNORMAL MAMMOGRAM: ICD-10-CM

## 2018-01-01 DIAGNOSIS — N30.90 RECURRENT CYSTITIS: ICD-10-CM

## 2018-01-01 DIAGNOSIS — E11.9 TYPE 2 DIABETES MELLITUS WITHOUT COMPLICATION, WITHOUT LONG-TERM CURRENT USE OF INSULIN (HCC): ICD-10-CM

## 2018-01-01 DIAGNOSIS — Z74.09 DECREASED AMBULATION STATUS: Primary | ICD-10-CM

## 2018-01-01 DIAGNOSIS — F03.90 DEMENTIA WITHOUT BEHAVIORAL DISTURBANCE, UNSPECIFIED DEMENTIA TYPE: Primary | ICD-10-CM

## 2018-01-01 DIAGNOSIS — R41.3 MEMORY LOSS: Primary | ICD-10-CM

## 2018-01-01 DIAGNOSIS — K64.9 HEMORRHOIDS, UNSPECIFIED HEMORRHOID TYPE: ICD-10-CM

## 2018-01-01 DIAGNOSIS — Z79.4 TYPE 2 DIABETES MELLITUS WITH STAGE 3 CHRONIC KIDNEY DISEASE, WITH LONG-TERM CURRENT USE OF INSULIN (HCC): ICD-10-CM

## 2018-01-01 DIAGNOSIS — R53.83 FATIGUE, UNSPECIFIED TYPE: Primary | ICD-10-CM

## 2018-01-01 LAB
ALBUMIN SERPL-MCNC: 3.6 G/DL (ref 3.5–5.2)
ALBUMIN SERPL-MCNC: 3.8 G/DL (ref 3.5–5.2)
ALBUMIN SERPL-MCNC: 4 G/DL (ref 3.5–5.2)
ALP BLD-CCNC: 138 U/L (ref 35–104)
ALP BLD-CCNC: 172 U/L (ref 35–104)
ALP BLD-CCNC: 185 U/L (ref 35–104)
ALT SERPL-CCNC: 14 U/L (ref 5–33)
ALT SERPL-CCNC: 17 U/L (ref 5–33)
ALT SERPL-CCNC: 20 U/L (ref 5–33)
ANA BY IFA: NORMAL
ANA IGG, ELISA: DETECTED
ANION GAP SERPL CALCULATED.3IONS-SCNC: 14 MMOL/L (ref 7–19)
ANION GAP SERPL CALCULATED.3IONS-SCNC: 14 MMOL/L (ref 7–19)
ANION GAP SERPL CALCULATED.3IONS-SCNC: 15 MMOL/L (ref 7–19)
ANION GAP SERPL CALCULATED.3IONS-SCNC: 19 MMOL/L (ref 7–19)
AST SERPL-CCNC: 51 U/L (ref 5–32)
AST SERPL-CCNC: 63 U/L (ref 5–32)
AST SERPL-CCNC: 76 U/L (ref 5–32)
BACTERIA: ABNORMAL /HPF
BACTERIA: ABNORMAL /HPF
BASOPHILS ABSOLUTE: 0.1 K/UL (ref 0–0.2)
BASOPHILS ABSOLUTE: 0.1 K/UL (ref 0–0.2)
BASOPHILS RELATIVE PERCENT: 0.5 % (ref 0–1)
BASOPHILS RELATIVE PERCENT: 0.5 % (ref 0–1)
BILIRUB SERPL-MCNC: 0.4 MG/DL (ref 0.2–1.2)
BILIRUB SERPL-MCNC: 0.5 MG/DL (ref 0.2–1.2)
BILIRUB SERPL-MCNC: 0.7 MG/DL (ref 0.2–1.2)
BILIRUBIN URINE: NEGATIVE
BILIRUBIN URINE: NEGATIVE
BLOOD, URINE: NEGATIVE
BLOOD, URINE: NEGATIVE
BUN BLDV-MCNC: 25 MG/DL (ref 8–23)
BUN BLDV-MCNC: 25 MG/DL (ref 8–23)
BUN BLDV-MCNC: 27 MG/DL (ref 8–23)
BUN BLDV-MCNC: 30 MG/DL (ref 8–23)
CALCIUM SERPL-MCNC: 10.1 MG/DL (ref 8.8–10.2)
CALCIUM SERPL-MCNC: 10.5 MG/DL (ref 8.8–10.2)
CALCIUM SERPL-MCNC: 11.9 MG/DL (ref 8.8–10.2)
CALCIUM SERPL-MCNC: 12.3 MG/DL (ref 8.8–10.2)
CHLORIDE BLD-SCNC: 102 MMOL/L (ref 98–111)
CHLORIDE BLD-SCNC: 104 MMOL/L (ref 98–111)
CHLORIDE BLD-SCNC: 97 MMOL/L (ref 98–111)
CHLORIDE BLD-SCNC: 99 MMOL/L (ref 98–111)
CLARITY: ABNORMAL
CLARITY: ABNORMAL
CO2: 22 MMOL/L (ref 22–29)
CO2: 27 MMOL/L (ref 22–29)
CO2: 27 MMOL/L (ref 22–29)
CO2: 29 MMOL/L (ref 22–29)
COLOR: YELLOW
COLOR: YELLOW
CREAT SERPL-MCNC: 1.1 MG/DL (ref 0.5–0.9)
CREAT SERPL-MCNC: 1.4 MG/DL (ref 0.5–0.9)
CREAT SERPL-MCNC: 1.4 MG/DL (ref 0.5–0.9)
CREAT SERPL-MCNC: 1.5 MG/DL (ref 0.5–0.9)
EOSINOPHILS ABSOLUTE: 0.2 K/UL (ref 0–0.6)
EOSINOPHILS ABSOLUTE: 0.2 K/UL (ref 0–0.6)
EOSINOPHILS RELATIVE PERCENT: 1.2 % (ref 0–5)
EOSINOPHILS RELATIVE PERCENT: 1.2 % (ref 0–5)
EPITHELIAL CELLS, UA: 1 /HPF (ref 0–5)
EPITHELIAL CELLS, UA: 2 /HPF (ref 0–5)
GFR NON-AFRICAN AMERICAN: 34
GFR NON-AFRICAN AMERICAN: 37
GFR NON-AFRICAN AMERICAN: 37
GFR NON-AFRICAN AMERICAN: 49
GLUCOSE BLD-MCNC: 137 MG/DL (ref 74–109)
GLUCOSE BLD-MCNC: 256 MG/DL (ref 74–109)
GLUCOSE BLD-MCNC: 313 MG/DL (ref 74–109)
GLUCOSE BLD-MCNC: 89 MG/DL (ref 74–109)
GLUCOSE URINE: NEGATIVE MG/DL
GLUCOSE URINE: NEGATIVE MG/DL
HBA1C MFR BLD: 7.3 %
HCT VFR BLD CALC: 42.3 % (ref 37–47)
HCT VFR BLD CALC: 45.6 % (ref 37–47)
HCT VFR BLD CALC: 47.4 % (ref 37–47)
HEMOGLOBIN: 12.7 G/DL (ref 12–16)
HEMOGLOBIN: 13.5 G/DL (ref 12–16)
HEMOGLOBIN: 14.4 G/DL (ref 12–16)
HYALINE CASTS: 4 /HPF (ref 0–8)
HYALINE CASTS: 9 /HPF (ref 0–8)
KETONES, URINE: NEGATIVE MG/DL
KETONES, URINE: NEGATIVE MG/DL
LEUKOCYTE ESTERASE, URINE: ABNORMAL
LEUKOCYTE ESTERASE, URINE: ABNORMAL
LYMPHOCYTES ABSOLUTE: 7.1 K/UL (ref 1.1–4.5)
LYMPHOCYTES ABSOLUTE: 7.2 K/UL (ref 1.1–4.5)
LYMPHOCYTES RELATIVE PERCENT: 38.2 % (ref 20–40)
LYMPHOCYTES RELATIVE PERCENT: 38.6 % (ref 20–40)
MCH RBC QN AUTO: 28.4 PG (ref 27–31)
MCH RBC QN AUTO: 28.9 PG (ref 27–31)
MCH RBC QN AUTO: 29.1 PG (ref 27–31)
MCHC RBC AUTO-ENTMCNC: 29.6 G/DL (ref 33–37)
MCHC RBC AUTO-ENTMCNC: 30 G/DL (ref 33–37)
MCHC RBC AUTO-ENTMCNC: 30.4 G/DL (ref 33–37)
MCV RBC AUTO: 95.8 FL (ref 81–99)
MCV RBC AUTO: 95.8 FL (ref 81–99)
MCV RBC AUTO: 96.1 FL (ref 81–99)
MONOCYTES ABSOLUTE: 1.2 K/UL (ref 0–0.9)
MONOCYTES ABSOLUTE: 1.2 K/UL (ref 0–0.9)
MONOCYTES RELATIVE PERCENT: 6.2 % (ref 0–10)
MONOCYTES RELATIVE PERCENT: 6.6 % (ref 0–10)
NEUTROPHILS ABSOLUTE: 9.6 K/UL (ref 1.5–7.5)
NEUTROPHILS ABSOLUTE: 9.8 K/UL (ref 1.5–7.5)
NEUTROPHILS RELATIVE PERCENT: 51.7 % (ref 50–65)
NEUTROPHILS RELATIVE PERCENT: 52.3 % (ref 50–65)
NITRITE, URINE: NEGATIVE
NITRITE, URINE: NEGATIVE
ORGANISM: ABNORMAL
ORGANISM: ABNORMAL
PDW BLD-RTO: 15.4 % (ref 11.5–14.5)
PDW BLD-RTO: 15.5 % (ref 11.5–14.5)
PDW BLD-RTO: 15.7 % (ref 11.5–14.5)
PH UA: 5.5
PH UA: 6
PLATELET # BLD: 444 K/UL (ref 130–400)
PLATELET # BLD: 457 K/UL (ref 130–400)
PLATELET # BLD: 518 K/UL (ref 130–400)
PMV BLD AUTO: 11.5 FL (ref 9.4–12.3)
PMV BLD AUTO: 12 FL (ref 9.4–12.3)
PMV BLD AUTO: 12.2 FL (ref 9.4–12.3)
POTASSIUM SERPL-SCNC: 4.3 MMOL/L (ref 3.5–5)
POTASSIUM SERPL-SCNC: 4.3 MMOL/L (ref 3.5–5)
POTASSIUM SERPL-SCNC: 5.1 MMOL/L (ref 3.5–5)
POTASSIUM SERPL-SCNC: 5.2 MMOL/L (ref 3.5–5)
PROTEIN UA: NEGATIVE MG/DL
PROTEIN UA: NEGATIVE MG/DL
RBC # BLD: 4.4 M/UL (ref 4.2–5.4)
RBC # BLD: 4.76 M/UL (ref 4.2–5.4)
RBC # BLD: 4.95 M/UL (ref 4.2–5.4)
RBC UA: 3 /HPF (ref 0–4)
RBC UA: 3 /HPF (ref 0–4)
RHEUMATOID FACTOR: <10 IU/ML
RPR: NORMAL
SODIUM BLD-SCNC: 140 MMOL/L (ref 136–145)
SODIUM BLD-SCNC: 141 MMOL/L (ref 136–145)
SODIUM BLD-SCNC: 143 MMOL/L (ref 136–145)
SODIUM BLD-SCNC: 145 MMOL/L (ref 136–145)
SPECIFIC GRAVITY UA: 1.01
SPECIFIC GRAVITY UA: 1.01
T4 FREE: 1.1 NG/DL (ref 0.9–1.7)
TOTAL PROTEIN: 7.8 G/DL (ref 6.6–8.7)
TOTAL PROTEIN: 8 G/DL (ref 6.6–8.7)
TOTAL PROTEIN: 8.2 G/DL (ref 6.6–8.7)
TSH SERPL DL<=0.05 MIU/L-ACNC: 1.18 UIU/ML (ref 0.27–4.2)
URINE CULTURE, ROUTINE: ABNORMAL
UROBILINOGEN, URINE: 0.2 E.U./DL
UROBILINOGEN, URINE: 1 E.U./DL
VITAMIN B-12: 314 PG/ML (ref 211–946)
VITAMIN B-12: 579 PG/ML (ref 211–946)
VITAMIN B1 WHOLE BLOOD: 50 NMOL/L (ref 70–180)
WBC # BLD: 18.6 K/UL (ref 4.8–10.8)
WBC # BLD: 18.7 K/UL (ref 4.8–10.8)
WBC # BLD: 19.6 K/UL (ref 4.8–10.8)
WBC UA: 42 /HPF (ref 0–5)
WBC UA: 57 /HPF (ref 0–5)

## 2018-01-01 PROCEDURE — 1036F TOBACCO NON-USER: CPT | Performed by: PSYCHIATRY & NEUROLOGY

## 2018-01-01 PROCEDURE — G8427 DOCREV CUR MEDS BY ELIG CLIN: HCPCS | Performed by: FAMILY MEDICINE

## 2018-01-01 PROCEDURE — 3014F SCREEN MAMMO DOC REV: CPT | Performed by: PSYCHIATRY & NEUROLOGY

## 2018-01-01 PROCEDURE — G8484 FLU IMMUNIZE NO ADMIN: HCPCS | Performed by: FAMILY MEDICINE

## 2018-01-01 PROCEDURE — 99204 OFFICE O/P NEW MOD 45 MIN: CPT | Performed by: PSYCHIATRY & NEUROLOGY

## 2018-01-01 PROCEDURE — G8400 PT W/DXA NO RESULTS DOC: HCPCS | Performed by: FAMILY MEDICINE

## 2018-01-01 PROCEDURE — G8417 CALC BMI ABV UP PARAM F/U: HCPCS | Performed by: FAMILY MEDICINE

## 2018-01-01 PROCEDURE — 3014F SCREEN MAMMO DOC REV: CPT | Performed by: FAMILY MEDICINE

## 2018-01-01 PROCEDURE — G8484 FLU IMMUNIZE NO ADMIN: HCPCS | Performed by: PSYCHIATRY & NEUROLOGY

## 2018-01-01 PROCEDURE — 99214 OFFICE O/P EST MOD 30 MIN: CPT | Performed by: FAMILY MEDICINE

## 2018-01-01 PROCEDURE — 3017F COLORECTAL CA SCREEN DOC REV: CPT | Performed by: PSYCHIATRY & NEUROLOGY

## 2018-01-01 PROCEDURE — 3017F COLORECTAL CA SCREEN DOC REV: CPT | Performed by: FAMILY MEDICINE

## 2018-01-01 PROCEDURE — G8417 CALC BMI ABV UP PARAM F/U: HCPCS | Performed by: PSYCHIATRY & NEUROLOGY

## 2018-01-01 PROCEDURE — 3045F PR MOST RECENT HEMOGLOBIN A1C LEVEL 7.0-9.0%: CPT | Performed by: FAMILY MEDICINE

## 2018-01-01 PROCEDURE — 3046F HEMOGLOBIN A1C LEVEL >9.0%: CPT | Performed by: FAMILY MEDICINE

## 2018-01-01 PROCEDURE — 77063 BREAST TOMOSYNTHESIS BI: CPT

## 2018-01-01 PROCEDURE — 1123F ACP DISCUSS/DSCN MKR DOCD: CPT | Performed by: FAMILY MEDICINE

## 2018-01-01 PROCEDURE — 1090F PRES/ABSN URINE INCON ASSESS: CPT | Performed by: FAMILY MEDICINE

## 2018-01-01 PROCEDURE — 4040F PNEUMOC VAC/ADMIN/RCVD: CPT | Performed by: PSYCHIATRY & NEUROLOGY

## 2018-01-01 PROCEDURE — G8427 DOCREV CUR MEDS BY ELIG CLIN: HCPCS | Performed by: PSYCHIATRY & NEUROLOGY

## 2018-01-01 PROCEDURE — 99213 OFFICE O/P EST LOW 20 MIN: CPT | Performed by: NURSE PRACTITIONER

## 2018-01-01 PROCEDURE — 1036F TOBACCO NON-USER: CPT | Performed by: FAMILY MEDICINE

## 2018-01-01 PROCEDURE — 4040F PNEUMOC VAC/ADMIN/RCVD: CPT | Performed by: FAMILY MEDICINE

## 2018-01-01 PROCEDURE — 1090F PRES/ABSN URINE INCON ASSESS: CPT | Performed by: PSYCHIATRY & NEUROLOGY

## 2018-01-01 PROCEDURE — 76642 ULTRASOUND BREAST LIMITED: CPT

## 2018-01-01 PROCEDURE — 2022F DILAT RTA XM EVC RTNOPTHY: CPT | Performed by: FAMILY MEDICINE

## 2018-01-01 PROCEDURE — 1123F ACP DISCUSS/DSCN MKR DOCD: CPT | Performed by: PSYCHIATRY & NEUROLOGY

## 2018-01-01 PROCEDURE — G8400 PT W/DXA NO RESULTS DOC: HCPCS | Performed by: PSYCHIATRY & NEUROLOGY

## 2018-01-01 PROCEDURE — 70551 MRI BRAIN STEM W/O DYE: CPT

## 2018-01-01 PROCEDURE — 99213 OFFICE O/P EST LOW 20 MIN: CPT | Performed by: FAMILY MEDICINE

## 2018-01-01 PROCEDURE — G0279 TOMOSYNTHESIS, MAMMO: HCPCS

## 2018-01-01 PROCEDURE — 96372 THER/PROPH/DIAG INJ SC/IM: CPT | Performed by: FAMILY MEDICINE

## 2018-01-01 RX ORDER — CYANOCOBALAMIN 1000 UG/ML
1000 INJECTION INTRAMUSCULAR; SUBCUTANEOUS ONCE
Status: COMPLETED | OUTPATIENT
Start: 2018-01-01 | End: 2018-01-01

## 2018-01-01 RX ORDER — SIMVASTATIN 40 MG
40 TABLET ORAL DAILY
Qty: 90 TABLET | Refills: 1 | Status: SHIPPED | OUTPATIENT
Start: 2018-01-01

## 2018-01-01 RX ORDER — LANOLIN ALCOHOL/MO/W.PET/CERES
100 CREAM (GRAM) TOPICAL DAILY
Qty: 30 TABLET | Refills: 5 | Status: SHIPPED | OUTPATIENT
Start: 2018-01-01 | End: 2018-01-01 | Stop reason: SDUPTHER

## 2018-01-01 RX ORDER — GLUCOSAMINE HCL/CHONDROITIN SU 500-400 MG
1 CAPSULE ORAL 3 TIMES DAILY
Qty: 100 STRIP | Refills: 2 | Status: SHIPPED | OUTPATIENT
Start: 2018-01-01 | End: 2018-01-01 | Stop reason: SDUPTHER

## 2018-01-01 RX ORDER — HYDROCODONE BITARTRATE AND ACETAMINOPHEN 5; 325 MG/1; MG/1
1 TABLET ORAL EVERY 8 HOURS PRN
COMMUNITY
End: 2018-01-01

## 2018-01-01 RX ORDER — CIPROFLOXACIN 250 MG/1
250 TABLET, FILM COATED ORAL 2 TIMES DAILY
Qty: 10 TABLET | Refills: 0 | Status: SHIPPED | OUTPATIENT
Start: 2018-01-01 | End: 2018-01-01

## 2018-01-01 RX ORDER — MEMANTINE HYDROCHLORIDE 10 MG/1
TABLET ORAL
Qty: 60 TABLET | Refills: 2 | Status: SHIPPED | OUTPATIENT
Start: 2018-01-01 | End: 2018-01-01 | Stop reason: SDUPTHER

## 2018-01-01 RX ORDER — LOPERAMIDE HYDROCHLORIDE 2 MG/1
2 CAPSULE ORAL 4 TIMES DAILY PRN
COMMUNITY

## 2018-01-01 RX ORDER — MEMANTINE HYDROCHLORIDE 10 MG/1
10 TABLET ORAL 2 TIMES DAILY
Qty: 180 TABLET | Refills: 1 | Status: SHIPPED | OUTPATIENT
Start: 2018-01-01

## 2018-01-01 RX ORDER — LANOLIN ALCOHOL/MO/W.PET/CERES
100 CREAM (GRAM) TOPICAL DAILY
Qty: 30 TABLET | Refills: 5 | Status: SHIPPED | OUTPATIENT
Start: 2018-01-01

## 2018-01-01 RX ORDER — ESCITALOPRAM OXALATE 20 MG/1
20 TABLET ORAL DAILY
Qty: 90 TABLET | Refills: 1 | Status: SHIPPED | OUTPATIENT
Start: 2018-01-01

## 2018-01-01 RX ORDER — INSULIN GLARGINE 100 [IU]/ML
40 INJECTION, SOLUTION SUBCUTANEOUS 2 TIMES DAILY
Qty: 8 PEN | Refills: 2 | Status: SHIPPED | OUTPATIENT
Start: 2018-01-01

## 2018-01-01 RX ORDER — GLUCOSAMINE HCL/CHONDROITIN SU 500-400 MG
1 CAPSULE ORAL 3 TIMES DAILY
Qty: 100 STRIP | Refills: 2 | Status: SHIPPED | OUTPATIENT
Start: 2018-01-01

## 2018-01-01 RX ORDER — ESCITALOPRAM OXALATE 20 MG/1
20 TABLET ORAL DAILY
Qty: 30 TABLET | Refills: 2 | Status: SHIPPED | OUTPATIENT
Start: 2018-01-01 | End: 2018-01-01 | Stop reason: SDUPTHER

## 2018-01-01 RX ORDER — ACTIVATED CHARCOAL 260 MG
260 CAPSULE ORAL 3 TIMES DAILY PRN
COMMUNITY

## 2018-01-01 RX ORDER — NITROFURANTOIN MACROCRYSTALS 50 MG/1
50 CAPSULE ORAL DAILY
Qty: 30 CAPSULE | Refills: 1 | Status: SHIPPED | OUTPATIENT
Start: 2018-01-01

## 2018-01-01 RX ADMIN — CYANOCOBALAMIN 1000 MCG: 1000 INJECTION INTRAMUSCULAR; SUBCUTANEOUS at 14:57

## 2018-01-01 RX ADMIN — CYANOCOBALAMIN 1000 MCG: 1000 INJECTION INTRAMUSCULAR; SUBCUTANEOUS at 10:25

## 2018-01-01 ASSESSMENT — ENCOUNTER SYMPTOMS
DIARRHEA: 0
EYE DISCHARGE: 0
NAUSEA: 0
ABDOMINAL PAIN: 0
ABDOMINAL PAIN: 0
BACK PAIN: 0
WHEEZING: 0
WHEEZING: 0
VOMITING: 0
CONSTIPATION: 0
SHORTNESS OF BREATH: 0
RHINORRHEA: 0
SHORTNESS OF BREATH: 0
SORE THROAT: 0
EYE PAIN: 0
ANAL BLEEDING: 1
COUGH: 0
NAUSEA: 0
RHINORRHEA: 0
BACK PAIN: 0
SHORTNESS OF BREATH: 0
DIARRHEA: 0
ABDOMINAL PAIN: 0
ABDOMINAL PAIN: 0
DIARRHEA: 0
EYE PAIN: 0
CONSTIPATION: 0
ABDOMINAL PAIN: 0
VOMITING: 0
WHEEZING: 0
EYE DISCHARGE: 0
BACK PAIN: 0
COUGH: 0
SORE THROAT: 0
CONSTIPATION: 0
RHINORRHEA: 0
EYE DISCHARGE: 0
SHORTNESS OF BREATH: 0
DIARRHEA: 0
COUGH: 0
SHORTNESS OF BREATH: 0
DIARRHEA: 0
NAUSEA: 0
CONSTIPATION: 0
WHEEZING: 0
VOMITING: 0
EYE DISCHARGE: 0
EYE PAIN: 0
RHINORRHEA: 0
VOMITING: 0
EYE DISCHARGE: 0
VOMITING: 0
CONSTIPATION: 0
COUGH: 0
EYE PAIN: 0
COUGH: 0
RHINORRHEA: 0
EYE PAIN: 0
BACK PAIN: 0
SORE THROAT: 0
SORE THROAT: 0
NAUSEA: 0
NAUSEA: 0

## 2018-01-01 ASSESSMENT — PATIENT HEALTH QUESTIONNAIRE - PHQ9
SUM OF ALL RESPONSES TO PHQ9 QUESTIONS 1 & 2: 2
1. LITTLE INTEREST OR PLEASURE IN DOING THINGS: 1
SUM OF ALL RESPONSES TO PHQ QUESTIONS 1-9: 2
2. FEELING DOWN, DEPRESSED OR HOPELESS: 1

## 2018-01-04 NOTE — PROGRESS NOTES
Renzo Foreman is a 67 y.o. female who presents today for   Chief Complaint   Patient presents with    Fatigue     Daughter states she always seems worried all the time and wont talk or socialize with anyone    Other     States she just dont feel good everyday, cant really pin point what is wrong just feels blah. Daughter states she cant remember things from 5 minutes before. Her other daughter states her hands were very shaky this morning. They are all wanting to make sure nothing is going on with one of her medications or something. Patient states no painful or frequent urination recently        HPI  Patient presents with her daughter. She reports that she has been feeling fatigued and tired for the past couple weeks. She cannot point to anything being wrong. Says she is tired. Her daughter reports that she's been sitting in her room more recently as opposed to going down socializing with other girls in the lobby of the apartments that she lives in. Otherwise the referringsymptoms or issues. Patient denies any dysuria, increased frequency, or urinary changes. She does however have a history of recurrent urinary tract infections. Review of Systems   Constitutional: Positive for fatigue. Negative for activity change, appetite change and fever. HENT: Negative for congestion, rhinorrhea and sore throat. Eyes: Negative for pain and discharge. Respiratory: Negative for cough, shortness of breath and wheezing. Cardiovascular: Negative for chest pain and palpitations. Gastrointestinal: Negative for abdominal pain, constipation, diarrhea, nausea and vomiting. Endocrine: Negative for cold intolerance and heat intolerance. Genitourinary: Negative for dysuria and hematuria. Musculoskeletal: Negative for back pain, gait problem and neck pain. Skin: Negative for rash and wound. Neurological: Positive for weakness. Negative for syncope. Hematological: Negative for adenopathy.  Does not bruise/bleed History:   Procedure Laterality Date    CHOLECYSTECTOMY      KNEE SURGERY      SPLENECTOMY      TONSILLECTOMY         Social History   Substance Use Topics    Smoking status: Former Smoker     Quit date: 1/3/2013    Smokeless tobacco: Never Used    Alcohol use No       Family History   Problem Relation Age of Onset    Cancer Mother     Heart Disease Mother        /67 (Site: Left Arm, Position: Sitting, Cuff Size: Large Adult)   Pulse 67   Temp 97.7 °F (36.5 °C) (Temporal)   Resp 16   Ht 5' 6\" (1.676 m)   Wt 219 lb 12.8 oz (99.7 kg)   SpO2 98%   BMI 35.48 kg/m²     Physical Exam   Constitutional: She is oriented to person, place, and time. She appears well-developed and well-nourished. No distress. HENT:   Head: Normocephalic and atraumatic. Right Ear: External ear normal.   Left Ear: External ear normal.   Nose: Nose normal.   Mouth/Throat: Oropharynx is clear and moist. No oropharyngeal exudate. Eyes: Conjunctivae and EOM are normal. Right eye exhibits no discharge. Left eye exhibits no discharge. No scleral icterus. Neck: Normal range of motion. Neck supple. No tracheal deviation present. No thyromegaly present. Cardiovascular: Normal rate, regular rhythm, normal heart sounds and intact distal pulses. Exam reveals no gallop and no friction rub. No murmur heard. Pulmonary/Chest: Effort normal and breath sounds normal. No respiratory distress. She has no wheezes. She has no rales. Abdominal: Soft. Bowel sounds are normal. She exhibits no distension. There is no tenderness. Musculoskeletal: Normal range of motion. She exhibits no edema or deformity. Lymphadenopathy:     She has no cervical adenopathy. Neurological: She is alert and oriented to person, place, and time. No cranial nerve deficit. Skin: Skin is warm and dry. No rash noted. She is not diaphoretic. No erythema. Psychiatric: She has a normal mood and affect.  Her behavior is normal. Thought content normal. Nursing note and vitals reviewed. Assessment:    ICD-10-CM ICD-9-CM    1. Fatigue, unspecified type R53.83 780.79 CBC Auto Differential      Comprehensive Metabolic Panel      Urinalysis   2. Weakness R53.1 780.79 CBC Auto Differential      Comprehensive Metabolic Panel      Urinalysis   3. Recurrent UTI N39.0 599.0 Urinalysis      External Referral To Urology       Plan:  Discussed possible causes checking labs for further evaluation. The patient is positive for a urinary tract infection will treat with antibiotics and provide a prophylactic dosing of Macrobid while waiting to get in to urology. Discussed signs and symptoms requiring medical attention. All questions were answered and patient/daughter voiced understanding and agreement with plan as discussed. Orders Placed This Encounter   Procedures    CBC Auto Differential     Standing Status:   Future     Number of Occurrences:   1     Standing Expiration Date:   1/4/2019    Comprehensive Metabolic Panel     Standing Status:   Future     Number of Occurrences:   1     Standing Expiration Date:   1/4/2019    Urinalysis     Standing Status:   Future     Number of Occurrences:   1     Standing Expiration Date:   1/4/2019    External Referral To Urology     Referral Priority:   Routine     Referral Type:   Consult for Advice and Opinion     Referral Reason:   Specialty Services Required     Referred to Provider:   Dougie Choi MD     Requested Specialty:   Urology     Number of Visits Requested:   1     No orders of the defined types were placed in this encounter. There are no discontinued medications. Patient Instructions   Patient given educational handouts and has had all questions answered. Patient voices understanding and agrees to plans along with risks and benefits of plan. Patient is instructed to continue prior meds, diet, and exercise plans as instructed. Patient agrees to follow up as instructed and sooner if needed.   Patient agrees to go to ER if condition becomes emergent. Return if symptoms worsen or fail to improve, for next scheduled follow up with PCP.

## 2018-01-04 NOTE — PATIENT INSTRUCTIONS
Patient Education        Fatigue: Care Instructions  Your Care Instructions    Fatigue is a feeling of tiredness, exhaustion, or lack of energy. You may feel fatigue because of too much or not enough activity. It can also come from stress, lack of sleep, boredom, and poor diet. Many medical problems, such as viral infections, can cause fatigue. Emotional problems, especially depression, are often the cause of fatigue. Fatigue is most often a symptom of another problem. Treatment for fatigue depends on the cause. For example, if you have fatigue because you have a certain health problem, treating this problem also treats your fatigue. If depression or anxiety is the cause, treatment may help. Follow-up care is a key part of your treatment and safety. Be sure to make and go to all appointments, and call your doctor if you are having problems. It's also a good idea to know your test results and keep a list of the medicines you take. How can you care for yourself at home? · Get regular exercise. But don't overdo it. Go back and forth between rest and exercise. · Get plenty of rest.  · Eat a healthy diet. Do not skip meals, especially breakfast.  · Reduce your use of caffeine, tobacco, and alcohol. Caffeine is most often found in coffee, tea, cola drinks, and chocolate. · Limit medicines that can cause fatigue. This includes tranquilizers and cold and allergy medicines. When should you call for help? Watch closely for changes in your health, and be sure to contact your doctor if:  ? · You have new symptoms such as fever or a rash. ? · Your fatigue gets worse. ? · You have been feeling down, depressed, or hopeless. Or you may have lost interest in things that you usually enjoy. ? · You are not getting better as expected. Where can you learn more? Go to https://delta.[employer]. org and sign in to your [employer] account.  Enter D801 in the [employer] box to learn more about

## 2018-01-11 ENCOUNTER — OFFICE VISIT (OUTPATIENT)
Dept: UROLOGY | Facility: CLINIC | Age: 73
End: 2018-01-11

## 2018-01-11 VITALS
WEIGHT: 218.6 LBS | DIASTOLIC BLOOD PRESSURE: 76 MMHG | HEIGHT: 65 IN | TEMPERATURE: 97.5 F | BODY MASS INDEX: 36.42 KG/M2 | SYSTOLIC BLOOD PRESSURE: 134 MMHG

## 2018-01-11 DIAGNOSIS — N39.0 URINARY TRACT INFECTION WITHOUT HEMATURIA, SITE UNSPECIFIED: Primary | ICD-10-CM

## 2018-01-11 DIAGNOSIS — N30.00 ACUTE CYSTITIS WITHOUT HEMATURIA: ICD-10-CM

## 2018-01-11 DIAGNOSIS — N30.20 CHRONIC CYSTITIS: ICD-10-CM

## 2018-01-11 LAB
BILIRUB BLD-MCNC: NEGATIVE MG/DL
CLARITY, POC: CLEAR
COLOR UR: YELLOW
GLUCOSE UR STRIP-MCNC: NEGATIVE MG/DL
KETONES UR QL: NEGATIVE
LEUKOCYTE EST, POC: ABNORMAL
NITRITE UR-MCNC: NEGATIVE MG/ML
PH UR: 5 [PH] (ref 5–8)
PROT UR STRIP-MCNC: NEGATIVE MG/DL
RBC # UR STRIP: NEGATIVE /UL
SP GR UR: 1.02 (ref 1–1.03)
UROBILINOGEN UR QL: NORMAL

## 2018-01-11 PROCEDURE — 51701 INSERT BLADDER CATHETER: CPT | Performed by: UROLOGY

## 2018-01-11 PROCEDURE — 87186 SC STD MICRODIL/AGAR DIL: CPT | Performed by: UROLOGY

## 2018-01-11 PROCEDURE — 87086 URINE CULTURE/COLONY COUNT: CPT | Performed by: UROLOGY

## 2018-01-11 PROCEDURE — 81001 URINALYSIS AUTO W/SCOPE: CPT | Performed by: UROLOGY

## 2018-01-11 PROCEDURE — 51798 US URINE CAPACITY MEASURE: CPT | Performed by: UROLOGY

## 2018-01-11 PROCEDURE — 99204 OFFICE O/P NEW MOD 45 MIN: CPT | Performed by: UROLOGY

## 2018-01-11 PROCEDURE — 87088 URINE BACTERIA CULTURE: CPT | Performed by: UROLOGY

## 2018-01-11 RX ORDER — DONEPEZIL HYDROCHLORIDE 10 MG/1
10 TABLET, FILM COATED ORAL NIGHTLY
COMMUNITY

## 2018-01-11 RX ORDER — MEMANTINE HYDROCHLORIDE 10 MG/1
10 TABLET ORAL 2 TIMES DAILY
COMMUNITY

## 2018-01-11 NOTE — PROGRESS NOTES
Subjective    Ms. Fountain is 72 y.o. female    CHIEF COMPLAINT: The patient comes in today with her daughters for recurrent urinary tract infections    Summary of the chart from Dr. Lopez shows that she is had multiple Escherichia coli infections along with skin floor these have had brought sensitivities and lipase Brother sensitivities appear to be the same organism over and over again.    She also has a history of dementia and diabetes.  She is not had any  surgery or stones from the old record oral hysterectomy    The patient and the family indicates these infections in the going on for about a year now off and on she does not have any hematuria associated with that she has an infection she has frequency urgency dysuria.    She is basically asymptomatic today although her urine does show some pyuria    She denies any stones  operations etc. as noted above.    We discussed good perineal care with the patient and her daughter she does have some intermittent diarrhea and no constipation she does wipe front to back and she does take showers    Interesting enough with some mild below freezing temperatures range she is dressed and shortness in a T-shirt      History of Present Illness      The following portions of the patient's history were reviewed and updated as appropriate: allergies, current medications, past family history, past medical history, past social history, past surgical history and problem list.    Review of Systems   Constitutional: Negative for appetite change, diaphoresis and fever.   HENT: Negative for facial swelling and sore throat.    Eyes: Negative for discharge and visual disturbance.   Respiratory: Negative for cough and shortness of breath.    Cardiovascular: Negative for chest pain and leg swelling.   Gastrointestinal: Negative for anal bleeding and vomiting.   Endocrine: Negative for cold intolerance and heat intolerance.   Genitourinary: Positive for dysuria. Negative for flank pain,  hematuria and pelvic pain.   Musculoskeletal: Negative for back pain and gait problem.   Skin: Negative for pallor and rash.   Allergic/Immunologic: Negative for food allergies and immunocompromised state.   Neurological: Negative for seizures and headaches.   Hematological: Negative for adenopathy. Does not bruise/bleed easily.   Psychiatric/Behavioral: Negative for dysphoric mood, self-injury and suicidal ideas.         Current Outpatient Prescriptions:   •  donepezil (ARICEPT) 10 MG tablet, Take 10 mg by mouth Every Night., Disp: , Rfl:   •  esomeprazole (NexIUM) 20 MG packet, Take 20 mg by mouth 2 times daily, Disp: , Rfl:   •  fenofibrate micronized (LOFIBRA) 134 MG capsule, Take 134 mg by mouth nightly, Disp: , Rfl:   •  furosemide (LASIX) 20 MG tablet, 20 mg daily, Disp: , Rfl:   •  HUMALOG KWIKPEN 100 UNIT/ML solution pen-injector, INJECT 35 UNITS 3 TIMES A DAY AS DIRECTED, Disp: , Rfl: 6  •  hydrochlorothiazide (MICROZIDE) 12.5 MG capsule, Take 12.5 mg by mouth daily, Disp: , Rfl:   •  HYDROcodone-acetaminophen (NORCO)  MG per tablet, Every 8 (Eight) Hours., Disp: , Rfl:   •  LANTUS SOLOSTAR 100 UNIT/ML injection pen, INJECT 40 UNITS EVERY MORNING  UNITS EVERY EVENING, Disp: , Rfl: 6  •  losartan (COZAAR) 100 MG tablet, Take 100 mg by mouth daily, Disp: , Rfl:   •  magnesium oxide (MAGOX) 400 (241.3 MG) MG tablet tablet, TAKE 1 TABLET BY MOUTH EVERY DAY, Disp: , Rfl: 2  •  memantine (NAMENDA) 10 MG tablet, Take 10 mg by mouth 2 (Two) Times a Day., Disp: , Rfl:   •  metoprolol succinate XL (TOPROL-XL) 50 MG 24 hr tablet, Take 50 mg by mouth daily, Disp: , Rfl:     Past Medical History:   Diagnosis Date   • Diabetes    • Dysphagia    • High cholesterol    • Hypertension    • Laryngopharyngeal reflux    • Lingual tonsil hypertrophy    • Thyroid nodule        Past Surgical History:   Procedure Laterality Date   • APPENDECTOMY     • CHOLECYSTECTOMY     • FINE NEEDLE ASPIRATION     • KNEE SURGERY    "  • SPLENECTOMY     • TONSILLECTOMY         Social History     Social History   • Marital status:      Spouse name: N/A   • Number of children: N/A   • Years of education: N/A     Social History Main Topics   • Smoking status: Former Smoker   • Smokeless tobacco: Never Used      Comment: 2012   • Alcohol use No   • Drug use: Defer   • Sexual activity: Not Asked     Other Topics Concern   • None     Social History Narrative       Family History   Problem Relation Age of Onset   • Cancer Mother    • Heart disease Mother        Objective    /76  Temp 97.5 °F (36.4 °C)  Ht 165.1 cm (65\")  Wt 99.2 kg (218 lb 9.6 oz)  BMI 36.38 kg/m2    Physical Exam   Constitutional: She is oriented to person, place, and time. She appears well-developed and well-nourished. No distress.   HENT:   Head: Normocephalic and atraumatic.   Nose: Nose normal.   Eyes: Conjunctivae are normal. No scleral icterus.   Neck: Normal range of motion. Neck supple. No tracheal deviation present. No thyromegaly (no mass) present.   Cardiovascular: Normal rate and regular rhythm.    No significant edema or varicosities. No tenderness   Pulmonary/Chest: Effort normal. No respiratory distress. She exhibits no tenderness.   No intercostal retractions, use of accessory muscles or asymmetric movements.    Abdominal: Soft. Normal appearance. She exhibits no distension, no ascites and no mass. There is no hepatosplenomegaly. There is no tenderness. There is no rigidity, no rebound, no guarding and no CVA tenderness. No hernia.   Abdomen is overweight there is a open cholecystectomy scar in the right and open splenectomy scar on the left.    In the cholecystectomy scar there is a 7-8 cm mass at his farm right in the middle of it I do not know whether this is may be an incisional hernia with some fat in it has gotten hard but I am certainly concerned about this there were no additional is a little bit tender as well.    Also noted there is no CVA " tenderness is no suprapubic tenderness there is no acute peritoneal signs Stool specimen is not indicated    Genitourinary: Vagina normal and uterus normal. Pelvic exam was performed with patient supine. There is no rash, tenderness or lesion on the right labia. There is no rash, tenderness or lesion on the left labia. Uterus is not enlarged and not tender. Cervix exhibits no motion tenderness, no discharge and no friability. Right adnexum displays no mass, no tenderness and no fullness. Left adnexum displays no mass, no tenderness and no fullness. No tenderness in the vagina. No vaginal discharge found.   Genitourinary Comments: Normal hair distribution. The urethra is without mass and normal in appearance. The bladder is palpably without mass or tenderness. Vaginal epithelium is without mass or significant atrophy. No significant prolapse.  There is mild rectocele The anus and perineum are normal.    Lymphadenopathy:     She has no cervical adenopathy.        Right: No inguinal adenopathy present.        Left: No inguinal adenopathy present.   Neurological: She is alert and oriented to person, place, and time.   Skin: No ecchymosis and no rash noted. She is not diaphoretic. No cyanosis or erythema. Nails show no clubbing.   Psychiatric: She has a normal mood and affect. Her behavior is normal.   Vitals reviewed.        Appointment on 04/25/2017   Component Date Value Ref Range Status   • Case Report 04/25/2017    Final                    Value:Medical Cytology Report                           Case: [id number]                                 Authorizing Provider:  JULI Alvarado           Collected:           04/25/2017 09:18 AM          Ordering Location:     [facility]     Received:            04/25/2017 10:21 AM                                 GROUP                                                                        Pathologist:           Adilson Melton MD                                                          Specimens:   1) - Thyroid, Left thyroid 1.5 cm                                                                   2) - Thyroid, Right thyroid 1.4 cm                                                        • Final Diagnosis 04/25/2017    Final                    Value:This result contains rich text formatting which cannot be displayed here.   • Gross Description 04/25/2017    Final                    Value:This result contains rich text formatting which cannot be displayed here.   • Intraoperative Consultation 04/25/2017    Final                    Value:This result contains rich text formatting which cannot be displayed here.   • Microscopic Description 04/25/2017    Final                    Value:This result contains rich text formatting which cannot be displayed here.       Results for orders placed or performed in visit on 01/11/18   POC Urinalysis Dipstick, Automated   Result Value Ref Range    Color Yellow Yellow, Straw, Dark Yellow, Venita    Clarity, UA Clear Clear    Glucose, UA Negative Negative, 1000 mg/dL (3+) mg/dL    Bilirubin Negative Negative    Ketones, UA Negative Negative    Specific Gravity  1.020 1.005 - 1.030    Blood, UA Negative Negative    pH, Urine 5.0 5.0 - 8.0    Protein, POC Negative Negative mg/dL    Urobilinogen, UA Normal Normal    Leukocytes Moderate (2+) (A) Negative    Nitrite, UA Negative Negative   Microscopic Urinalysis  I inspected the urine myself based on the clinical situation including the dipstick urine. The urine is spun in a centrifuge for three minutes. The spun urine shows 0-2 rbc/hpf, 7-12 wbc/hpf, 3-6 epi/hpf, negative bacteria, negative crystals, and negative casts.     Procedure note for in and out catheterization  She is placed in the dorsal lithotomy position.  She is carefully prepped with Betadine around the urethra.  A pediatric catheterization kit is used which contains an approximate 5 Divehi catheter.this is introduced into the urinary  bladder.  Approximately 30 mL of urine is obtained and sent to the lab for culture and sensitivity.  Assessment and Plan    Diagnoses and all orders for this visit:    Urinary tract infection without hematuria, site unspecified  -     POC Urinalysis Dipstick, Automated    Chronic cystitis    Acute cystitis without hematuria    Plan--again she had some white cells but no bacteria and negative nitrates I did cath urine for CNS we will await that culture to see if this grows anything to treat her.    As far as masses concern go ahead and get a CT scan of her abdomen pelvis and make sure that nothing else is going on here again this just incisional hernia with some necrotic fat but I am not sure        Estimation of residual urine via abdominal ultrasound  Residual Urine: 0 ml  Indication: uti  Position: Supine  Examination: Incremental scanning of the suprapubic area using 3 MHz transducer using copious amounts of acoustic gel.   Findings: An anechoic area was demonstrated which represented the bladder, with measurement of residual urine as noted. I inspected this myself. In that the residual urine was stable or insignificant, no treatment will be necessary at this time.

## 2018-01-12 LAB
BACTERIA SPEC AEROBE CULT: ABNORMAL
BACTERIA SPEC AEROBE CULT: ABNORMAL

## 2018-01-17 DIAGNOSIS — N30.00 ACUTE CYSTITIS WITHOUT HEMATURIA: Primary | ICD-10-CM

## 2018-01-17 RX ORDER — NITROFURANTOIN 25; 75 MG/1; MG/1
100 CAPSULE ORAL EVERY 12 HOURS SCHEDULED
Qty: 14 CAPSULE | Refills: 0 | Status: SHIPPED | OUTPATIENT
Start: 2018-01-17 | End: 2018-01-24

## 2018-01-29 NOTE — PROGRESS NOTES
for 1 wk, then 1 tablet twice daily. adjust as tolerated 60 tablet 2    escitalopram (LEXAPRO) 20 MG tablet Take 1 tablet by mouth daily 30 tablet 2    simvastatin (ZOCOR) 40 MG tablet Take 1 tablet by mouth daily 90 tablet 1    donepezil (ARICEPT) 10 MG tablet Take 1 tablet by mouth nightly 90 tablet 1    fenofibric acid (FIBRICOR) 135 MG CPDR capsule TAKE 1 CAPSULE BY MOUTH EVERY NIGHT AT BEDTIME  2    LANTUS SOLOSTAR 100 UNIT/ML injection pen Inject 40 Units into the skin nightly 5 Pen 2    Insulin Lispro (HUMALOG KWIKPEN SC) Inject into the skin      esomeprazole Magnesium (NEXIUM) 20 MG PACK Take 20 mg by mouth 2 times daily      hydrochlorothiazide (MICROZIDE) 12.5 MG capsule Take 12.5 mg by mouth daily      losartan (COZAAR) 100 MG tablet Take 100 mg by mouth daily      metoprolol succinate (TOPROL XL) 50 MG extended release tablet Take 50 mg by mouth daily      Magnesium Oxide (MAG-OXIDE PO) Take 400 mg by mouth daily      furosemide (LASIX) 20 MG tablet 20 mg daily       No current facility-administered medications for this visit. Allergies   Allergen Reactions    Bactrim [Sulfamethoxazole-Trimethoprim] Other (See Comments)     Pt does not recall exact reaction      Keflex [Cephalexin] Other (See Comments)     Pt does not recall exact reaction    Oxycodone Other (See Comments)     Caused pt to jerk uncontrollably.     Sulfa Antibiotics Other (See Comments)     Pt does not recall exact reaction       Past Surgical History:   Procedure Laterality Date    CHOLECYSTECTOMY      KNEE SURGERY      SPLENECTOMY      TONSILLECTOMY         Social History   Substance Use Topics    Smoking status: Former Smoker     Quit date: 1/3/2013    Smokeless tobacco: Never Used    Alcohol use No       Family History   Problem Relation Age of Onset    Cancer Mother     Heart Disease Mother        /72 (Site: Left Arm, Position: Sitting, Cuff Size: Large Adult)   Pulse 67   Temp 98.4 °F (36.9 °C) (Temporal)   Resp 16   Ht 5' 6\" (1.676 m)   Wt 217 lb (98.4 kg)   SpO2 97%   BMI 35.02 kg/m²     Physical Exam   Constitutional: She is oriented to person, place, and time. She appears well-developed and well-nourished. No distress. HENT:   Head: Normocephalic and atraumatic. Right Ear: External ear normal.   Left Ear: External ear normal.   Nose: Nose normal.   Mouth/Throat: Oropharynx is clear and moist. No oropharyngeal exudate. Eyes: Conjunctivae and EOM are normal. Right eye exhibits no discharge. Left eye exhibits no discharge. No scleral icterus. Neck: Normal range of motion. Neck supple. No tracheal deviation present. No thyromegaly present. Cardiovascular: Normal rate, regular rhythm, normal heart sounds and intact distal pulses. Exam reveals no gallop and no friction rub. No murmur heard. Pulmonary/Chest: Effort normal and breath sounds normal. No respiratory distress. She has no wheezes. She has no rales. Abdominal: Soft. Bowel sounds are normal. She exhibits mass (small knot appreciated in the right upper quadrant. There does appear to be a hernia appreciated with coughing. ). She exhibits no distension. There is no tenderness. Genitourinary: Rectal exam shows internal hemorrhoid and guaiac positive stool (trace). No vaginal discharge found. Genitourinary Comments: Internal hemorrhoid appreciated posterior left. Musculoskeletal: Normal range of motion. She exhibits no edema or deformity. Lymphadenopathy:     She has no cervical adenopathy. Neurological: She is alert and oriented to person, place, and time. No cranial nerve deficit. Skin: Skin is warm and dry. No rash noted. She is not diaphoretic. No erythema. Psychiatric: She has a normal mood and affect. Her behavior is normal. Thought content normal.   Nursing note and vitals reviewed. Assessment:    ICD-10-CM ICD-9-CM    1.  Type 2 diabetes mellitus without complication, with long-term current use of insulin ([facility] 75.) E11.9 250.00 Discussed blood sugar log that was reviewed with recommendations at this time for increasing her Humalog dose to 30 units with meals. In addition to the 30 units with meals discussed the addition of sliding scale based on blood sugar readings taken prior to the meal. Loulou Mccarthy also going to initially increase her Lantus dose to 45 units twice a day with continued monitoring of blood sugar and further adjustments based on blood sugar numbers and log from these changes. Based on her dementia and the number of people involved with assisting her with her medications and meals it is not seem feasible to do any kind of strict carb counting as each individual would inconsistent with each other at this point consistency would be an improvement with attempts to gradually decrease her average blood sugar. Discussed importance of DM diet and benefits of exercise. Discussed proper use of medication. Stressed proper foot care and monitoring. Discussed the importance of yearly eye exams. Z79.4 V58.67    2. Blood per rectum K62.5 569.3 External Referral To Gastroenterology    Discussed possible causes of her passing blood. Discussed the results of her fecal occult blood test. After discussion as well as discussion of her need for a screening colonoscopy through shared decision making this decision was made to follow-up with GI locally for evaluation of her GI bleeding as well as her screening colonoscopy. 3. Screening for malignant neoplasm of colon Z12.11 V76.51 External Referral To Gastroenterology   4. Internal hemorrhoid K64.8 455.0 hydrocortisone (ANUSOL-HC) 2.5 % rectal cream   5. Recurrent UTI N39.0 599.0 Doing well with prophylactic dosing of nitrofurantoin. Is currently being followed by Dr. Sunny Cheney for assistance in treatment      6.  Screening for malignant neoplasm of breast Z12.31 V76.10 HORACE DIGITAL SCREEN W OR WO CAD BILATERAL         Plan:  Discussed diagnosis, expected course, and proper use of medication, including OTC medications if prescription is too expensive or insurance does not cover. Discussed signs and symptoms requiring medical attention. All questions were answered and patient/family voiced understanding and agreement with plan as discussed. Orders Placed This Encounter   Procedures    HORACE DIGITAL SCREEN W OR WO CAD BILATERAL     Standing Status:   Future     Standing Expiration Date:   3/29/2019     Order Specific Question:   Reason for exam:     Answer:   screening mammo    External Referral To Gastroenterology     Referral Priority:   Routine     Referral Type:   Consult for Advice and Opinion     Referral Reason:   Specialty Services Required     Requested Specialty:   Gastroenterology     Number of Visits Requested:   1     Orders Placed This Encounter   Medications    hydrocortisone (ANUSOL-HC) 2.5 % rectal cream     Sig: Place rectally 2 times daily. For 7 days     Dispense:  1 Tube     Refill:  0     There are no discontinued medications. Patient Instructions   Patient given educational handouts and has had all questions answered. Patient voices understanding and agrees to plans along with risks and benefits of plan. Patient is instructed to continue prior meds, diet, and exercise plans as instructed. Patient agrees to follow up as instructed and sooner if needed. Patient agrees to go to ER if condition becomes emergent. Return if symptoms worsen or fail to improve, for next scheduled follow up with PCP.

## 2018-01-29 NOTE — PATIENT INSTRUCTIONS
Patient Education        Learning About Diabetes Food Guidelines  Your Care Instructions    Meal planning is important to manage diabetes. It helps keep your blood sugar at a target level (which you set with your doctor). You don't have to eat special foods. You can eat what your family eats, including sweets once in a while. But you do have to pay attention to how often you eat and how much you eat of certain foods. You may want to work with a dietitian or a certified diabetes educator (CDE) to help you plan meals and snacks. A dietitian or CDE can also help you lose weight if that is one of your goals. What should you know about eating carbs? Managing the amount of carbohydrate (carbs) you eat is an important part of healthy meals when you have diabetes. Carbohydrate is found in many foods. · Learn which foods have carbs. And learn the amounts of carbs in different foods. ¨ Bread, cereal, pasta, and rice have about 15 grams of carbs in a serving. A serving is 1 slice of bread (1 ounce), ½ cup of cooked cereal, or 1/3 cup of cooked pasta or rice. ¨ Fruits have 15 grams of carbs in a serving. A serving is 1 small fresh fruit, such as an apple or orange; ½ of a banana; ½ cup of cooked or canned fruit; ½ cup of fruit juice; 1 cup of melon or raspberries; or 2 tablespoons of dried fruit. ¨ Milk and no-sugar-added yogurt have 15 grams of carbs in a serving. A serving is 1 cup of milk or 2/3 cup of no-sugar-added yogurt. ¨ Starchy vegetables have 15 grams of carbs in a serving. A serving is ½ cup of mashed potatoes or sweet potato; 1 cup winter squash; ½ of a small baked potato; ½ cup of cooked beans; or ½ cup cooked corn or green peas. · Learn how much carbs to eat each day and at each meal. A dietitian or CDE can teach you how to keep track of the amount of carbs you eat. This is called carbohydrate counting. · If you are not sure how to count carbohydrate grams, use the Plate Method to plan meals.  It is a when cooking. · Don't skip meals. Your blood sugar may drop too low if you skip meals and take insulin or certain medicines for diabetes. · Check with your doctor before you drink alcohol. Alcohol can cause your blood sugar to drop too low. Alcohol can also cause a bad reaction if you take certain diabetes medicines. Follow-up care is a key part of your treatment and safety. Be sure to make and go to all appointments, and call your doctor if you are having problems. It's also a good idea to know your test results and keep a list of the medicines you take. Where can you learn more? Go to [email or web address] and sign in to your [employer] account. Enter B082 in the Photomedex box to learn more about \"Learning About Diabetes Food Guidelines. \"     If you do not have an account, please click on the \"Sign Up Now\" link. Current as of: March 13, 2017  Content Version: 11.5  © 0313-8872 [employer]. Care instructions adapted under license by [facility] ([facility]). If you have questions about a medical condition or this instruction, always ask your healthcare professional. [place] [address] any warranty or liability for your use of this information.

## 2018-01-30 ENCOUNTER — OFFICE VISIT (OUTPATIENT)
Dept: UROLOGY | Facility: CLINIC | Age: 73
End: 2018-01-30

## 2018-01-30 ENCOUNTER — HOSPITAL ENCOUNTER (OUTPATIENT)
Dept: CT IMAGING | Facility: HOSPITAL | Age: 73
Discharge: HOME | End: 2018-01-30
Attending: UROLOGY | Admitting: UROLOGY

## 2018-01-30 VITALS — BODY MASS INDEX: 35.03 KG/M2 | WEIGHT: 218 LBS | TEMPERATURE: 97.5 F | HEIGHT: 66 IN

## 2018-01-30 DIAGNOSIS — N28.1 RENAL CYST: ICD-10-CM

## 2018-01-30 DIAGNOSIS — N30.00 ACUTE CYSTITIS WITHOUT HEMATURIA: Primary | ICD-10-CM

## 2018-01-30 DIAGNOSIS — N39.0 URINARY TRACT INFECTION WITHOUT HEMATURIA, SITE UNSPECIFIED: ICD-10-CM

## 2018-01-30 PROCEDURE — 99214 OFFICE O/P EST MOD 30 MIN: CPT | Performed by: UROLOGY

## 2018-01-30 PROCEDURE — 74176 CT ABD & PELVIS W/O CONTRAST: CPT

## 2018-01-30 RX ORDER — NITROFURANTOIN MACROCRYSTALS 50 MG/1
50 CAPSULE ORAL NIGHTLY
Qty: 90 CAPSULE | Refills: 5 | Status: SHIPPED | OUTPATIENT
Start: 2018-01-30 | End: 2018-04-30

## 2018-01-30 NOTE — PROGRESS NOTES
Subjective    Ms. Fountain is 72 y.o. female    CHIEF COMPLAINT: Chronic cystitis    When last seen we did do a culture which showed an Escherichia coli that was sensitive to Macrodantin and sulfa and we treat her with some Macrodantin she is actually doing much better today and not having a whole lot of symptoms unfortunately when she voided she missed the hands we could not look at a urine today.    She is not had any fever no gross maturing is no flank pain etc.    I did get a CAT scan on her because I thought she had a right upper quadrant mass actually looks like is probably just a big liver and which goes down lower than normal think is probably what I felt I do not seen thing else the radiologist did not see thing she does have a renal cyst      CT independent review  The CT scan of the abdomen/pelvis done without contrast is available for me to review.  Treatment recommendations require an independent review.  First I scanned the liver, spleen, and bowel pattern.  The retroperitoneum including the major vessels and lymphatic packages are briefly reviewed.  This film as been reviewed by the radiologist to determine any non urologic abnormalities that are present.  The kidneys are closely inspected for size, symmetry, contour, parenchymal thickness, perinephric reaction, presence of calcifications, and intrarenal dilation of the collecting system.  The ureters are inspected for their course, caliber, and any calcifications.  The bladder is inspected for its thickness, size, and presence of any calcifications.  This scan shows:    The right kidney appears abnormal on this non contrasted CT scan.   Normal parenchymal thickness, No hydronephrosis, No hydroureter, No renal masses, No renal stones and Single renal cyst measuring 30 mm    The left kidney appears normal on this non-contrasted CT scan.  The renal parenchymal is norml in thickness.  There are no solid masses or cysts.  There is no hydronephrosis.  There  are no stones.  Questionable small stone peripheral and nonobstructing    The bladder appears normal on this non-contrasted CT scan.  The bladder appears normal in thickness.  There no masses or stones seen on this exam.      History of Present Illness      The following portions of the patient's history were reviewed and updated as appropriate: allergies, current medications, past family history, past medical history, past social history, past surgical history and problem list.    Review of Systems   Constitutional: Negative for chills and fever.   Gastrointestinal: Negative for abdominal pain, anal bleeding and blood in stool.   Genitourinary: Negative for flank pain and hematuria.         Current Outpatient Prescriptions:   •  donepezil (ARICEPT) 10 MG tablet, Take 10 mg by mouth Every Night., Disp: , Rfl:   •  esomeprazole (NexIUM) 20 MG packet, Take 20 mg by mouth 2 times daily, Disp: , Rfl:   •  fenofibrate micronized (LOFIBRA) 134 MG capsule, Take 134 mg by mouth nightly, Disp: , Rfl:   •  furosemide (LASIX) 20 MG tablet, 20 mg daily, Disp: , Rfl:   •  HUMALOG KWIKPEN 100 UNIT/ML solution pen-injector, INJECT 35 UNITS 3 TIMES A DAY AS DIRECTED, Disp: , Rfl: 6  •  hydrochlorothiazide (MICROZIDE) 12.5 MG capsule, Take 12.5 mg by mouth daily, Disp: , Rfl:   •  HYDROcodone-acetaminophen (NORCO)  MG per tablet, Every 8 (Eight) Hours., Disp: , Rfl:   •  LANTUS SOLOSTAR 100 UNIT/ML injection pen, INJECT 40 UNITS EVERY MORNING  UNITS EVERY EVENING, Disp: , Rfl: 6  •  losartan (COZAAR) 100 MG tablet, Take 100 mg by mouth daily, Disp: , Rfl:   •  magnesium oxide (MAGOX) 400 (241.3 MG) MG tablet tablet, TAKE 1 TABLET BY MOUTH EVERY DAY, Disp: , Rfl: 2  •  memantine (NAMENDA) 10 MG tablet, Take 10 mg by mouth 2 (Two) Times a Day., Disp: , Rfl:   •  metoprolol succinate XL (TOPROL-XL) 50 MG 24 hr tablet, Take 50 mg by mouth daily, Disp: , Rfl:   •  nitrofurantoin (MACRODANTIN) 50 MG capsule, Take 1 capsule by  "mouth Every Night for 90 days., Disp: 90 capsule, Rfl: 5    Past Medical History:   Diagnosis Date   • Diabetes    • Dysphagia    • High cholesterol    • Hypertension    • Laryngopharyngeal reflux    • Lingual tonsil hypertrophy    • Thyroid nodule        Past Surgical History:   Procedure Laterality Date   • APPENDECTOMY     • CHOLECYSTECTOMY     • FINE NEEDLE ASPIRATION     • KNEE SURGERY     • SPLENECTOMY     • TONSILLECTOMY         Social History     Social History   • Marital status:      Spouse name: N/A   • Number of children: N/A   • Years of education: N/A     Social History Main Topics   • Smoking status: Former Smoker   • Smokeless tobacco: Never Used      Comment: 2012   • Alcohol use No   • Drug use: Defer   • Sexual activity: Not Asked     Other Topics Concern   • None     Social History Narrative       Family History   Problem Relation Age of Onset   • Cancer Mother    • Heart disease Mother        Objective    Temp 97.5 °F (36.4 °C)  Ht 167.6 cm (66\")  Wt 98.9 kg (218 lb)  BMI 35.19 kg/m2    Physical Exam      Office Visit on 01/11/2018   Component Date Value Ref Range Status   • Color 01/11/2018 Yellow  Yellow, Straw, Dark Yellow, Venita Final   • Clarity, UA 01/11/2018 Clear  Clear Final   • Glucose, UA 01/11/2018 Negative  Negative, 1000 mg/dL (3+) mg/dL Final   • Bilirubin 01/11/2018 Negative  Negative Final   • Ketones, UA 01/11/2018 Negative  Negative Final   • Specific Gravity  01/11/2018 1.020  1.005 - 1.030 Final   • Blood, UA 01/11/2018 Negative  Negative Final   • pH, Urine 01/11/2018 5.0  5.0 - 8.0 Final   • Protein, POC 01/11/2018 Negative  Negative mg/dL Final   • Urobilinogen, UA 01/11/2018 Normal  Normal Final   • Leukocytes 01/11/2018 Moderate (2+)* Negative Final   • Nitrite, UA 01/11/2018 Negative  Negative Final   • Urine Culture 01/11/2018 >100,000 CFU/mL Escherichia coli*  Final       Results for orders placed or performed in visit on 01/11/18   Urine Culture - Urine, " Urine, Clean Catch   Result Value Ref Range    Urine Culture      Urine Culture >100,000 CFU/mL Escherichia coli (A)        Susceptibility    Escherichia coli - CLARIBEL     Ampicillin <=2 Susceptible ug/ml     Ampicillin + Sulbactam <=2 Susceptible ug/ml     Cefazolin* <=4 Susceptible ug/ml      * Cefazolin results may be used to predict the potential effectiveness of oral cephalosporins for treating uncomplicated urinary tract infections.     Cefepime <=1 Susceptible ug/ml     Ceftriaxone <=1 Susceptible ug/ml     Ertapenem <=0.5 Susceptible ug/ml     ESBL Confirmation Test NEG Negative      Gentamicin <=1 Susceptible ug/ml     Levofloxacin 4 Intermediate ug/ml     Meropenem <=0.25 Susceptible ug/ml     Nitrofurantoin <=16 Susceptible ug/ml     Piperacillin + Tazobactam <=4 Susceptible ug/ml     Trimethoprim + Sulfamethoxazole <=20 Susceptible ug/ml   POC Urinalysis Dipstick, Automated   Result Value Ref Range    Color Yellow Yellow, Straw, Dark Yellow, Venita    Clarity, UA Clear Clear    Glucose, UA Negative Negative, 1000 mg/dL (3+) mg/dL    Bilirubin Negative Negative    Ketones, UA Negative Negative    Specific Gravity  1.020 1.005 - 1.030    Blood, UA Negative Negative    pH, Urine 5.0 5.0 - 8.0    Protein, POC Negative Negative mg/dL    Urobilinogen, UA Normal Normal    Leukocytes Moderate (2+) (A) Negative    Nitrite, UA Negative Negative       Assessment and Plan    Diagnoses and all orders for this visit:    Acute cystitis without hematuria  -     Cancel: POC Urinalysis Dipstick, Automated    Urinary tract infection without hematuria, site unspecified  -     nitrofurantoin (MACRODANTIN) 50 MG capsule; Take 1 capsule by mouth Every Night for 90 days.    Renal cyst    Plan--I discussed the CT findings with the patient and her daughters we will send a CT scan report [place].    I discussed the cyst with them indicating the benign nature and I think they understood everything clearly.    I am undergo ahead for  her on some low-dose Macrodantin 50 mg once a day see we can keep her infections under good control we will see her back in 6 months but if she feels she is having a breakthrough infection she will let me know

## 2018-02-05 NOTE — PROGRESS NOTES
[facility] Neurology Office Note      Patient:   Alicja Richard  MR#:    [id number]  Account Number:                         YOB: 1945  Date of Evaluation:  2/5/2018  Time of Note:                          9:35 AM  Primary/Referring Physician:  Faby Dyer MD   Consulting Physician:  Ayde Rose D.O.    NEW PATIENT CONSULTATION    Chief Complaint   Patient presents with    Memory Loss     New Patient Referal patient stated she is always having ot take her medication after the fact she has taken it for the day        HISTORY OF PRESENT ILLNESS    Alicja Richard is a 67y.o. year old female here for memory loss. Gradually worsening, but noted a significant change after knee surgery back in July. Short term mostly affected. Lives at home alone. No hallucinations. Some gait difficulty, does use a walker. No prior stroke history. No dementia history. Does note a mild intermittent tremor. She is on Namenda and Aricept. Memory is significantly affecting ADLs, requires help with most.  Driving occasionally. Past Medical History:   Diagnosis Date    Diabetes ([address]     type 2    History of blood clots     Hyperlipidemia     Hypertension     Osteoarthritis        Past Surgical History:   Procedure Laterality Date    CHOLECYSTECTOMY      KNEE SURGERY      SPLENECTOMY      TONSILLECTOMY         Family History   Problem Relation Age of Onset    Cancer Mother     Heart Disease Mother        Social History     Social History    Marital status:      Spouse name: N/A    Number of children: N/A    Years of education: N/A     Occupational History    Not on file.      Social History Main Topics    Smoking status: Former Smoker     Quit date: 1/3/2013    Smokeless tobacco: Never Used    Alcohol use No    Drug use: No    Sexual activity: No     Other Topics Concern    Not on file     Social History Narrative    No narrative on file       Current Outpatient Prescriptions   Medication Sig

## 2018-02-06 NOTE — TELEPHONE ENCOUNTER
Patient has an appointment with Dr. Nicolas Merida on 2/12/18. Dr. Xiao Grullon, [facility], wants patient to see Dr. Nicolas Merida regarding abnormal lab results.

## 2018-02-19 NOTE — PROGRESS NOTES
Josette Caballero is a 67 y.o. female who presents today for   Chief Complaint   Patient presents with    Diabetes     Doing alot better with her sugars    Depression    Dementia    Other     Scheduled for colonoscopy for tomorrow       HPI  Diabetes Mellitus Type 2: Current symptoms/problems include none. lantus- 45 twice a day  humalog- 30 units with meals plus ssi or ssi only if not eating. Medication compliance:  compliant most of the time  Diabetic diet compliance:  compliant most of the time,  Weight trend: stable  Current exercise: no regular exercise  Barriers: none    Home blood sugar records: mid to upper 100's  Any episodes of hypoglycemia? no  Eye exam current (within one year): yes   reports that she quit smoking about 5 years ago. She has never used smokeless tobacco.     Lab Results   Component Value Date    LABA1C 8.6 (H) 11/09/2017     Lab Results   Component Value Date    LABMICR 21.70 (H) 11/09/2017    CREATININE 1.4 (H) 02/05/2018     Lab Results   Component Value Date    ALT 20 02/05/2018    AST 63 (H) 02/05/2018     Lab Results   Component Value Date    CHOL 253 (H) 11/09/2017    TRIG 397 (H) 11/09/2017    HDL 41 (L) 11/09/2017    LDLCALC 133 11/09/2017        Patient also has a history of dementia for which she is now following with neurology and Dr. roberts. She is tolerating her current medications of Aricept and Namenda without issues. She also has a history of recurrent urinary tract infections and has been seen by Dr. Chen Marquez who increased her prophylactic dose of nitrofurantoin she was started on prior to being seen by urology from 50 mg to 100 mg. She reports that she is doing well and daughter who is present also reports that she is doing well with this treatment without any problems recently or concerns of a urinary tract infection. Patient was previously seen with issues with hemorrhoids and has been using hemorrhoid cream which has improved her symptoms.  She denies any issues °C) (Temporal)   Resp 16   Ht 5' 6\" (1.676 m)   Wt 216 lb 12.8 oz (98.3 kg)   SpO2 97%   BMI 34.99 kg/m²     Physical Exam   Constitutional: She is oriented to person, place, and time. She appears well-developed and well-nourished. No distress. HENT:   Head: Normocephalic and atraumatic. Right Ear: External ear normal.   Left Ear: External ear normal.   Nose: Nose normal.   Eyes: Conjunctivae and EOM are normal. Right eye exhibits no discharge. Left eye exhibits no discharge. No scleral icterus. Neck: Normal range of motion. Neck supple. No tracheal deviation present. No thyromegaly present. Cardiovascular: Normal rate, regular rhythm, normal heart sounds and intact distal pulses. Exam reveals no gallop and no friction rub. No murmur heard. Pulmonary/Chest: Effort normal and breath sounds normal. No respiratory distress. She has no wheezes. She has no rales. Abdominal: Soft. Bowel sounds are normal. She exhibits no distension. There is no tenderness. Musculoskeletal: Normal range of motion. She exhibits no edema or deformity. Lymphadenopathy:     She has no cervical adenopathy. Neurological: She is alert and oriented to person, place, and time. No cranial nerve deficit. Skin: Skin is warm and dry. No rash noted. She is not diaphoretic. No erythema. Psychiatric: She has a normal mood and affect. Her behavior is normal. Thought content normal.   Nursing note and vitals reviewed. Assessment:    ICD-10-CM ICD-9-CM    1. Type 2 diabetes mellitus with stage 3 chronic kidney disease, with long-term current use of insulin ([facility]) E11.22 988.07 Basic Metabolic Panel    Q21.3 410.3 Hemoglobin A1C    Z79.4 V58.67   Continue to stress stressed the importance of diet and compliance with insulin dosing. Improvement noted by daughter with her blood sugars. Will plan on rechecking hemoglobin A1c for better indication of blood sugar average.  We'll continue to monitor and adjust treatment course as

## 2018-02-20 PROCEDURE — 88305 TISSUE EXAM BY PATHOLOGIST: CPT | Performed by: INTERNAL MEDICINE

## 2018-02-21 ENCOUNTER — LAB REQUISITION (OUTPATIENT)
Dept: LAB | Facility: HOSPITAL | Age: 73
End: 2018-02-21

## 2018-02-21 DIAGNOSIS — Z00.00 ROUTINE GENERAL MEDICAL EXAMINATION AT A HEALTH CARE FACILITY: ICD-10-CM

## 2018-02-22 LAB
CYTO UR: NORMAL
LAB AP CASE REPORT: NORMAL
LAB AP CLINICAL INFORMATION: NORMAL
Lab: NORMAL
PATH REPORT.FINAL DX SPEC: NORMAL
PATH REPORT.GROSS SPEC: NORMAL

## 2018-03-06 NOTE — TELEPHONE ENCOUNTER
Tried to contact patient to schedule diagnostic imaging  Daughter called and scheduled diagnostic imaging

## 2018-03-06 NOTE — PROGRESS NOTES
After obtaining consent, and per orders of Dr. Chuck Simms, injection of B12 given in Right deltoid by Azeb Tomlinson. Patient instructed to remain in clinic for 20 minutes afterwards, and to report any adverse reaction to me immediately.

## 2018-03-22 PROBLEM — Z90.81 THROMBOCYTOSIS AFTER SPLENECTOMY: Status: ACTIVE | Noted: 2017-02-27

## 2018-03-22 PROBLEM — D75.838 THROMBOCYTOSIS AFTER SPLENECTOMY: Status: ACTIVE | Noted: 2017-02-27

## 2018-03-22 PROBLEM — N30.20 CHRONIC CYSTITIS: Status: ACTIVE | Noted: 2018-01-01

## 2018-03-22 PROBLEM — N39.0 URINARY TRACT INFECTION WITHOUT HEMATURIA: Status: ACTIVE | Noted: 2018-01-01

## 2018-03-22 PROBLEM — N28.1 RENAL CYST: Status: ACTIVE | Noted: 2018-01-01

## 2018-03-22 NOTE — PROGRESS NOTES
REVIEW OF SYSTEMS    Constitutional: []Fever []Sweats []Chills [] Recent Injury []Fatigue  [x] Denies all unless marked  HEENT:[]Headache  [] Head Injury  [] Sore Throat  [] Ear Pain  []Dizziness [] Hearing Loss []Trouble Swallowing []Voice Change  [] Eye Pain  [] Eye Injection []Visual Disturbance  [] Ptosis  [] Tinnitus [x] Denies all unless marked  Spine:  [] Neck pain  [] Back pain  [] Sciaticia  [x] Denies all unless marked  Cardiovascular:[]Chest Pain []Palpitations [] Heart Disease  [x] Denies all unless marked  Pulmonary: []Shortness of Breath [x]Cough  []Wheezing  [x] Denies all unless marked  Gastrointestinal:  []Abdominal Pain  []Blood in Stool  []Diarrhea []Constipation []Nausea  []Vomiting  [x] Denies all unless marked  Genitourinary:  [] Dysuria [] Enuresis [] Incontinence [] Frequency/Urgency  [] Hematuria  [x] Denies all unless marked  Musculoskeletal: [x] Joint Pain [] Myalgias [] Joint Swelling [] Neck Stiffness  [x] Denies all unless marked  Skin:[] Rash [] Pallor [] Color Change [] Wound  [x] Denies all unless marked  Neurological:[] Visual Disturbance [] Double Vision [] Slurred Speech [] Trouble swallowing  [] Vertigo [] Tingling [] Numbness [] Weakness [x] Loss of Balance [] Loss of Consciousness [x] Memory Loss [] Tremor [] Seizure [] Syncope  [] Ataxia  [x] Denies all unless marked  Psychiatric/Behavioral:[x] Depression [] Anxiety [] Confusion [] Agitation [] Behavior Problems  [] Hallucinations  [] Suicidal idiation  [x] Denies all unless marked  Sleep: []  Insomnia [] Sleep Disturbance [] Snoring [] Restless Legs [] Daytime Sleeping [] Sleep Apnea  [x] Denies all unless marked  Hematological:[] Adenopathy [] Bruises/Bleeds Easily  [x] Denies all unless marked  Endocrine: [] Cold Intolerance [] Heat Intolerance [] Polydipsia [] Polyphagia [] Polyuria  [x]Denies all unless marked  Allergic/Immunologic:[] Environmental Allergies [] Food Allergies [] Immunocompromised state  [x] Denies all

## 2018-03-23 ENCOUNTER — TELEPHONE (OUTPATIENT)
Dept: UROLOGY | Facility: CLINIC | Age: 73
End: 2018-03-23

## 2018-03-23 NOTE — TELEPHONE ENCOUNTER
Patient daughter called in and stated that the medication that the patient was written yesterday has not been called in. She stated that she was left two messages and would like a call back today.

## 2018-03-23 NOTE — TELEPHONE ENCOUNTER
They were advised that Dr. Pacheco will not be here until Monday and the neurologist can treat it until then and if Dr. Pacheco wants to do something different we will let them know.SM

## 2018-03-23 NOTE — TELEPHONE ENCOUNTER
Aubrey from [facility] called and Ms. Fountain was her nuerologist on 3-22-18. She had increased confusion and they did labs on her and it showed Ecoli and they did not treat it with anything because they didn't want to interfere with what Dr. Pacheco had on-going with her. He is faxing over the lab results so Dr. Pacheco can determine the next step.

## 2018-03-26 NOTE — TELEPHONE ENCOUNTER
I called the patients daughter and she said never mind about treating her moms infection because she was already in the hospital.SM

## 2018-03-26 NOTE — TELEPHONE ENCOUNTER
Patient's daughter called and Ms. Fountain went to [facility] due to confusion. She was calling to see if a prescription will be called in. I let her know that Dr. Pacheco was waiting on the labs to determine what she needed and that the labs were in the chart now but he was in OR until this afternoon.

## 2018-03-28 NOTE — TELEPHONE ENCOUNTER
Patient's daughter, Navneet Ramey, states her mother has been in the hospital with dehydration and uti, Dr. Aleyda Cano has been seeing her, she will be going to nursing home today for therapy. Daughter wants Dr. Fish Every to go over patient's med list and see if she really needs some of them. Daughter thinks her dementia medication is too strong, patient seems to be in a fog since it was increased to twice daily.

## 2018-04-26 ENCOUNTER — TELEPHONE (OUTPATIENT)
Dept: UROLOGY | Facility: CLINIC | Age: 73
End: 2018-04-26

## 2018-04-26 NOTE — TELEPHONE ENCOUNTER
THIS IS A YARITZA PATIENT...    Patient's daughter called and patient was in [facility] for rehab and while there she was given IV's because her calcium was high and they told her she was in kidney failure. Her discharge papers state Stage 3/4 Kidney Disease. She doesn't have an appt until July so does she need an appt sooner to speak with Dr. Pacheco? I'm having the [facility] home fax over the records from when she was admitted.

## 2018-05-04 ENCOUNTER — HOSPITAL ENCOUNTER (INPATIENT)
Facility: HOSPITAL | Age: 73
LOS: 19 days | Discharge: HOME | End: 2018-05-23
Attending: EMERGENCY MEDICINE | Admitting: FAMILY MEDICINE

## 2018-05-04 ENCOUNTER — APPOINTMENT (OUTPATIENT)
Dept: GENERAL RADIOLOGY | Facility: HOSPITAL | Age: 73
End: 2018-05-04

## 2018-05-04 ENCOUNTER — APPOINTMENT (OUTPATIENT)
Dept: CT IMAGING | Facility: HOSPITAL | Age: 73
End: 2018-05-04

## 2018-05-04 DIAGNOSIS — D72.825 BANDEMIA: ICD-10-CM

## 2018-05-04 DIAGNOSIS — Z78.9 IMPAIRED MOBILITY AND ADLS: ICD-10-CM

## 2018-05-04 DIAGNOSIS — Z74.09 IMPAIRED MOBILITY AND ADLS: ICD-10-CM

## 2018-05-04 DIAGNOSIS — R00.1 BRADYCARDIA: ICD-10-CM

## 2018-05-04 DIAGNOSIS — Z74.09 IMPAIRED FUNCTIONAL MOBILITY AND ACTIVITY TOLERANCE: ICD-10-CM

## 2018-05-04 DIAGNOSIS — J18.9 PNEUMONIA SYMPTOMS: Primary | ICD-10-CM

## 2018-05-04 LAB
ALBUMIN SERPL-MCNC: 3.7 G/DL (ref 3.5–5)
ALBUMIN/GLOB SERPL: 0.9 G/DL (ref 1.1–2.5)
ALP SERPL-CCNC: 175 U/L (ref 24–120)
ALT SERPL W P-5'-P-CCNC: 21 U/L (ref 0–54)
ANION GAP SERPL CALCULATED.3IONS-SCNC: 10 MMOL/L (ref 4–13)
ANISOCYTOSIS BLD QL: ABNORMAL
AST SERPL-CCNC: 119 U/L (ref 7–45)
BACTERIA UR QL AUTO: ABNORMAL /HPF
BILIRUB SERPL-MCNC: 1 MG/DL (ref 0.1–1)
BILIRUB UR QL STRIP: NEGATIVE
BUN BLD-MCNC: 34 MG/DL (ref 5–21)
BUN/CREAT SERPL: 20.5 (ref 7–25)
CA-I BLD-MCNC: 6.11 MG/DL (ref 4.6–5.4)
CALCIUM SPEC-SCNC: 12.4 MG/DL (ref 8.4–10.4)
CHLORIDE SERPL-SCNC: 104 MMOL/L (ref 98–110)
CK SERPL-CCNC: 32 U/L (ref 0–203)
CLARITY UR: ABNORMAL
CO2 SERPL-SCNC: 28 MMOL/L (ref 24–31)
COLOR UR: YELLOW
CREAT BLD-MCNC: 1.66 MG/DL (ref 0.5–1.4)
DEPRECATED RDW RBC AUTO: 53.2 FL (ref 40–54)
ERYTHROCYTE [DISTWIDTH] IN BLOOD BY AUTOMATED COUNT: 16.1 % (ref 12–15)
GFR SERPL CREATININE-BSD FRML MDRD: 30 ML/MIN/1.73
GIANT PLATELETS: ABNORMAL
GLOBULIN UR ELPH-MCNC: 4.1 GM/DL
GLUCOSE BLD-MCNC: 111 MG/DL (ref 70–100)
GLUCOSE BLDC GLUCOMTR-MCNC: 101 MG/DL (ref 70–130)
GLUCOSE BLDC GLUCOMTR-MCNC: 184 MG/DL (ref 70–130)
GLUCOSE UR STRIP-MCNC: NEGATIVE MG/DL
HBA1C MFR BLD: 6.8 %
HCT VFR BLD AUTO: 44.5 % (ref 37–47)
HGB BLD-MCNC: 13.9 G/DL (ref 12–16)
HGB UR QL STRIP.AUTO: NEGATIVE
HOLD SPECIMEN: NORMAL
HOLD SPECIMEN: NORMAL
HYALINE CASTS UR QL AUTO: ABNORMAL /LPF
KETONES UR QL STRIP: NEGATIVE
LEUKOCYTE ESTERASE UR QL STRIP.AUTO: ABNORMAL
LYMPHOCYTES # BLD MANUAL: 13.77 10*3/MM3 (ref 0.72–4.86)
LYMPHOCYTES NFR BLD MANUAL: 52 % (ref 15–45)
LYMPHOCYTES NFR BLD MANUAL: 6 % (ref 4–12)
Lab: ABNORMAL
MACROCYTES BLD QL SMEAR: ABNORMAL
MCH RBC QN AUTO: 28.4 PG (ref 28–32)
MCHC RBC AUTO-ENTMCNC: 31.2 G/DL (ref 33–36)
MCV RBC AUTO: 91 FL (ref 82–98)
MONOCYTES # BLD AUTO: 1.59 10*3/MM3 (ref 0.19–1.3)
NEUTROPHILS # BLD AUTO: 11.13 10*3/MM3 (ref 1.87–8.4)
NEUTROPHILS NFR BLD MANUAL: 42 % (ref 39–78)
NITRITE UR QL STRIP: POSITIVE
PH UR STRIP.AUTO: <=5 [PH] (ref 5–8)
PHOSPHATE SERPL-MCNC: 3.2 MG/DL (ref 2.5–4.5)
PLATELET # BLD AUTO: 444 10*3/MM3 (ref 130–400)
PMV BLD AUTO: 12.5 FL (ref 6–12)
POTASSIUM BLD-SCNC: 4.5 MMOL/L (ref 3.5–5.3)
PROCALCITONIN SERPL-MCNC: 2.11 NG/ML
PROT SERPL-MCNC: 7.8 G/DL (ref 6.3–8.7)
PROT UR QL STRIP: NEGATIVE
PTH-INTACT SERPL-MCNC: 5.1 PG/ML (ref 7.5–53.5)
RBC # BLD AUTO: 4.89 10*6/MM3 (ref 4.2–5.4)
RBC # UR: ABNORMAL /HPF
REF LAB TEST METHOD: ABNORMAL
SODIUM BLD-SCNC: 142 MMOL/L (ref 135–145)
SP GR UR STRIP: 1.01 (ref 1–1.03)
SQUAMOUS #/AREA URNS HPF: ABNORMAL /HPF
UROBILINOGEN UR QL STRIP: ABNORMAL
WBC MORPH BLD: NORMAL
WBC NRBC COR # BLD: 26.49 10*3/MM3 (ref 4.8–10.8)
WBC UR QL AUTO: ABNORMAL /HPF
WHOLE BLOOD HOLD SPECIMEN: NORMAL
WHOLE BLOOD HOLD SPECIMEN: NORMAL

## 2018-05-04 PROCEDURE — 25010000002 ENOXAPARIN PER 10 MG: Performed by: INTERNAL MEDICINE

## 2018-05-04 PROCEDURE — 80053 COMPREHEN METABOLIC PANEL: CPT | Performed by: EMERGENCY MEDICINE

## 2018-05-04 PROCEDURE — 87186 SC STD MICRODIL/AGAR DIL: CPT | Performed by: FAMILY MEDICINE

## 2018-05-04 PROCEDURE — 82550 ASSAY OF CK (CPK): CPT | Performed by: EMERGENCY MEDICINE

## 2018-05-04 PROCEDURE — 93005 ELECTROCARDIOGRAM TRACING: CPT | Performed by: EMERGENCY MEDICINE

## 2018-05-04 PROCEDURE — 87077 CULTURE AEROBIC IDENTIFY: CPT | Performed by: FAMILY MEDICINE

## 2018-05-04 PROCEDURE — 83519 RIA NONANTIBODY: CPT | Performed by: INTERNAL MEDICINE

## 2018-05-04 PROCEDURE — 63710000001 INSULIN DETEMIR PER 5 UNITS: Performed by: INTERNAL MEDICINE

## 2018-05-04 PROCEDURE — 84100 ASSAY OF PHOSPHORUS: CPT | Performed by: INTERNAL MEDICINE

## 2018-05-04 PROCEDURE — 81001 URINALYSIS AUTO W/SCOPE: CPT | Performed by: EMERGENCY MEDICINE

## 2018-05-04 PROCEDURE — 93005 ELECTROCARDIOGRAM TRACING: CPT

## 2018-05-04 PROCEDURE — 71045 X-RAY EXAM CHEST 1 VIEW: CPT

## 2018-05-04 PROCEDURE — 93010 ELECTROCARDIOGRAM REPORT: CPT | Performed by: INTERNAL MEDICINE

## 2018-05-04 PROCEDURE — 84145 PROCALCITONIN (PCT): CPT | Performed by: INTERNAL MEDICINE

## 2018-05-04 PROCEDURE — 87147 CULTURE TYPE IMMUNOLOGIC: CPT | Performed by: EMERGENCY MEDICINE

## 2018-05-04 PROCEDURE — 63710000001 INSULIN LISPRO (HUMAN) PER 5 UNITS: Performed by: INTERNAL MEDICINE

## 2018-05-04 PROCEDURE — 83970 ASSAY OF PARATHORMONE: CPT | Performed by: INTERNAL MEDICINE

## 2018-05-04 PROCEDURE — 82330 ASSAY OF CALCIUM: CPT

## 2018-05-04 PROCEDURE — 87086 URINE CULTURE/COLONY COUNT: CPT | Performed by: FAMILY MEDICINE

## 2018-05-04 PROCEDURE — 83036 HEMOGLOBIN GLYCOSYLATED A1C: CPT | Performed by: INTERNAL MEDICINE

## 2018-05-04 PROCEDURE — 87040 BLOOD CULTURE FOR BACTERIA: CPT | Performed by: EMERGENCY MEDICINE

## 2018-05-04 PROCEDURE — 99285 EMERGENCY DEPT VISIT HI MDM: CPT

## 2018-05-04 PROCEDURE — 25010000002 CALCITONIN PER 400 UNITS: Performed by: INTERNAL MEDICINE

## 2018-05-04 PROCEDURE — 87077 CULTURE AEROBIC IDENTIFY: CPT | Performed by: EMERGENCY MEDICINE

## 2018-05-04 PROCEDURE — 25010000002 PIPERACILLIN SOD-TAZOBACTAM PER 1 G: Performed by: EMERGENCY MEDICINE

## 2018-05-04 PROCEDURE — 82397 CHEMILUMINESCENT ASSAY: CPT | Performed by: INTERNAL MEDICINE

## 2018-05-04 PROCEDURE — 85025 COMPLETE CBC W/AUTO DIFF WBC: CPT | Performed by: EMERGENCY MEDICINE

## 2018-05-04 PROCEDURE — 25010000002 VANCOMYCIN PER 500 MG: Performed by: EMERGENCY MEDICINE

## 2018-05-04 PROCEDURE — 85007 BL SMEAR W/DIFF WBC COUNT: CPT | Performed by: EMERGENCY MEDICINE

## 2018-05-04 PROCEDURE — 70450 CT HEAD/BRAIN W/O DYE: CPT

## 2018-05-04 PROCEDURE — 87150 DNA/RNA AMPLIFIED PROBE: CPT | Performed by: EMERGENCY MEDICINE

## 2018-05-04 PROCEDURE — 82962 GLUCOSE BLOOD TEST: CPT

## 2018-05-04 RX ORDER — SODIUM CHLORIDE 0.9 % (FLUSH) 0.9 %
1-10 SYRINGE (ML) INJECTION AS NEEDED
Status: DISCONTINUED | OUTPATIENT
Start: 2018-05-04 | End: 2018-05-23 | Stop reason: HOSPADM

## 2018-05-04 RX ORDER — NICOTINE POLACRILEX 4 MG
15 LOZENGE BUCCAL
Status: DISCONTINUED | OUTPATIENT
Start: 2018-05-04 | End: 2018-05-23 | Stop reason: HOSPADM

## 2018-05-04 RX ORDER — SODIUM CHLORIDE 0.9 % (FLUSH) 0.9 %
10 SYRINGE (ML) INJECTION AS NEEDED
Status: DISCONTINUED | OUTPATIENT
Start: 2018-05-04 | End: 2018-05-23 | Stop reason: HOSPADM

## 2018-05-04 RX ORDER — DEXTROSE MONOHYDRATE 25 G/50ML
25 INJECTION, SOLUTION INTRAVENOUS
Status: DISCONTINUED | OUTPATIENT
Start: 2018-05-04 | End: 2018-05-23 | Stop reason: HOSPADM

## 2018-05-04 RX ORDER — SODIUM CHLORIDE 9 MG/ML
75 INJECTION, SOLUTION INTRAVENOUS CONTINUOUS
Status: DISCONTINUED | OUTPATIENT
Start: 2018-05-04 | End: 2018-05-08

## 2018-05-04 RX ORDER — HYDROCODONE BITARTRATE AND ACETAMINOPHEN 5; 325 MG/1; MG/1
1 TABLET ORAL EVERY 8 HOURS PRN
Status: DISCONTINUED | OUTPATIENT
Start: 2018-05-04 | End: 2018-05-05

## 2018-05-04 RX ORDER — DONEPEZIL HYDROCHLORIDE 10 MG/1
10 TABLET, FILM COATED ORAL NIGHTLY
Status: DISCONTINUED | OUTPATIENT
Start: 2018-05-04 | End: 2018-05-09

## 2018-05-04 RX ORDER — CALCITONIN SALMON 200 [USP'U]/ML
100 INJECTION, SOLUTION INTRAMUSCULAR; SUBCUTANEOUS DAILY
Status: DISCONTINUED | OUTPATIENT
Start: 2018-05-04 | End: 2018-05-06

## 2018-05-04 RX ORDER — FUROSEMIDE 20 MG/1
20 TABLET ORAL DAILY
Status: DISCONTINUED | OUTPATIENT
Start: 2018-05-04 | End: 2018-05-17

## 2018-05-04 RX ORDER — MEMANTINE HYDROCHLORIDE 5 MG/1
10 TABLET ORAL DAILY
Status: DISCONTINUED | OUTPATIENT
Start: 2018-05-04 | End: 2018-05-23 | Stop reason: HOSPADM

## 2018-05-04 RX ORDER — ACETAMINOPHEN 325 MG/1
650 TABLET ORAL EVERY 4 HOURS PRN
Status: DISCONTINUED | OUTPATIENT
Start: 2018-05-04 | End: 2018-05-06

## 2018-05-04 RX ORDER — ONDANSETRON 2 MG/ML
4 INJECTION INTRAMUSCULAR; INTRAVENOUS EVERY 6 HOURS PRN
Status: DISCONTINUED | OUTPATIENT
Start: 2018-05-04 | End: 2018-05-23 | Stop reason: HOSPADM

## 2018-05-04 RX ORDER — SACCHAROMYCES BOULARDII 250 MG
250 CAPSULE ORAL 2 TIMES DAILY
Status: DISCONTINUED | OUTPATIENT
Start: 2018-05-04 | End: 2018-05-23 | Stop reason: HOSPADM

## 2018-05-04 RX ADMIN — HYDROCODONE BITARTRATE AND ACETAMINOPHEN 1 TABLET: 5; 325 TABLET ORAL at 21:00

## 2018-05-04 RX ADMIN — VANCOMYCIN HYDROCHLORIDE 1750 MG: 1 INJECTION, POWDER, LYOPHILIZED, FOR SOLUTION INTRAVENOUS at 18:39

## 2018-05-04 RX ADMIN — MEMANTINE HYDROCHLORIDE 10 MG: 5 TABLET, FILM COATED ORAL at 21:01

## 2018-05-04 RX ADMIN — SODIUM CHLORIDE 75 ML/HR: 9 INJECTION, SOLUTION INTRAVENOUS at 18:38

## 2018-05-04 RX ADMIN — INSULIN LISPRO 2 UNITS: 100 INJECTION, SOLUTION INTRAVENOUS; SUBCUTANEOUS at 22:05

## 2018-05-04 RX ADMIN — CALCITONIN SALMON 100 UNITS: 200 INJECTION, SOLUTION INTRAMUSCULAR; SUBCUTANEOUS at 20:58

## 2018-05-04 RX ADMIN — INSULIN DETEMIR 40 UNITS: 100 INJECTION, SOLUTION SUBCUTANEOUS at 22:06

## 2018-05-04 RX ADMIN — TAZOBACTAM SODIUM AND PIPERACILLIN SODIUM 4.5 G: 500; 4 INJECTION, SOLUTION INTRAVENOUS at 17:26

## 2018-05-04 RX ADMIN — Medication 250 MG: at 21:00

## 2018-05-04 RX ADMIN — ENOXAPARIN SODIUM 30 MG: 30 INJECTION SUBCUTANEOUS at 18:39

## 2018-05-04 RX ADMIN — DONEPEZIL HYDROCHLORIDE 10 MG: 10 TABLET, FILM COATED ORAL at 21:01

## 2018-05-05 ENCOUNTER — APPOINTMENT (OUTPATIENT)
Dept: CT IMAGING | Facility: HOSPITAL | Age: 73
End: 2018-05-05

## 2018-05-05 LAB
25(OH)D3 SERPL-MCNC: 24.9 NG/ML (ref 30–100)
ALBUMIN SERPL-MCNC: 3.2 G/DL (ref 3.5–5)
ALBUMIN/GLOB SERPL: 0.9 G/DL (ref 1.1–2.5)
ALP SERPL-CCNC: 159 U/L (ref 24–120)
ALT SERPL W P-5'-P-CCNC: 32 U/L (ref 0–54)
ANION GAP SERPL CALCULATED.3IONS-SCNC: 13 MMOL/L (ref 4–13)
ANISOCYTOSIS BLD QL: ABNORMAL
AST SERPL-CCNC: 108 U/L (ref 7–45)
BACTERIA BLD CULT: ABNORMAL
BASO STIPL COARSE BLD QL SMEAR: ABNORMAL
BASOPHILS # BLD MANUAL: 0.26 10*3/MM3 (ref 0–0.2)
BASOPHILS NFR BLD AUTO: 1 % (ref 0–2)
BILIRUB SERPL-MCNC: 1 MG/DL (ref 0.1–1)
BUN BLD-MCNC: 31 MG/DL (ref 5–21)
BUN/CREAT SERPL: 22.3 (ref 7–25)
CALCIUM SPEC-SCNC: 11.6 MG/DL (ref 8.4–10.4)
CHLORIDE SERPL-SCNC: 108 MMOL/L (ref 98–110)
CO2 SERPL-SCNC: 23 MMOL/L (ref 24–31)
CREAT BLD-MCNC: 1.39 MG/DL (ref 0.5–1.4)
DEPRECATED RDW RBC AUTO: 52.1 FL (ref 40–54)
EOSINOPHIL # BLD MANUAL: 0.26 10*3/MM3 (ref 0–0.7)
EOSINOPHIL NFR BLD MANUAL: 1 % (ref 0–4)
ERYTHROCYTE [DISTWIDTH] IN BLOOD BY AUTOMATED COUNT: 16.1 % (ref 12–15)
GFR SERPL CREATININE-BSD FRML MDRD: 37 ML/MIN/1.73
GIANT PLATELETS: ABNORMAL
GLOBULIN UR ELPH-MCNC: 3.5 GM/DL
GLUCOSE BLD-MCNC: 70 MG/DL (ref 70–100)
GLUCOSE BLDC GLUCOMTR-MCNC: 65 MG/DL (ref 70–130)
GLUCOSE BLDC GLUCOMTR-MCNC: 71 MG/DL (ref 70–130)
GLUCOSE BLDC GLUCOMTR-MCNC: 86 MG/DL (ref 70–130)
GLUCOSE BLDC GLUCOMTR-MCNC: 96 MG/DL (ref 70–130)
HCT VFR BLD AUTO: 42.1 % (ref 37–47)
HGB BLD-MCNC: 13.4 G/DL (ref 12–16)
LYMPHOCYTES # BLD MANUAL: 7.57 10*3/MM3 (ref 0.72–4.86)
LYMPHOCYTES NFR BLD MANUAL: 29.6 % (ref 15–45)
LYMPHOCYTES NFR BLD MANUAL: 8.2 % (ref 4–12)
MACROCYTES BLD QL SMEAR: ABNORMAL
MCH RBC QN AUTO: 28.6 PG (ref 28–32)
MCHC RBC AUTO-ENTMCNC: 31.8 G/DL (ref 33–36)
MCV RBC AUTO: 90 FL (ref 82–98)
MONOCYTES # BLD AUTO: 2.1 10*3/MM3 (ref 0.19–1.3)
NEUTROPHILS # BLD AUTO: 9.66 10*3/MM3 (ref 1.87–8.4)
NEUTROPHILS NFR BLD MANUAL: 34.7 % (ref 39–78)
NEUTS BAND NFR BLD MANUAL: 3.1 % (ref 0–10)
PLATELET # BLD AUTO: 434 10*3/MM3 (ref 130–400)
PMV BLD AUTO: 12.5 FL (ref 6–12)
POLYCHROMASIA BLD QL SMEAR: ABNORMAL
POTASSIUM BLD-SCNC: 4.5 MMOL/L (ref 3.5–5.3)
PROT SERPL-MCNC: 6.7 G/DL (ref 6.3–8.7)
RBC # BLD AUTO: 4.68 10*6/MM3 (ref 4.2–5.4)
SMALL PLATELETS BLD QL SMEAR: ABNORMAL
SODIUM BLD-SCNC: 144 MMOL/L (ref 135–145)
TARGETS BLD QL SMEAR: ABNORMAL
TSH SERPL DL<=0.05 MIU/L-ACNC: 1.54 MIU/ML (ref 0.47–4.68)
VARIANT LYMPHS NFR BLD MANUAL: 22.4 % (ref 0–5)
WBC MORPH BLD: NORMAL
WBC NRBC COR # BLD: 25.59 10*3/MM3 (ref 4.8–10.8)

## 2018-05-05 PROCEDURE — 25010000002 PIPERACILLIN SOD-TAZOBACTAM PER 1 G: Performed by: INTERNAL MEDICINE

## 2018-05-05 PROCEDURE — 25010000002 CALCITONIN PER 400 UNITS: Performed by: INTERNAL MEDICINE

## 2018-05-05 PROCEDURE — 85007 BL SMEAR W/DIFF WBC COUNT: CPT | Performed by: INTERNAL MEDICINE

## 2018-05-05 PROCEDURE — 71250 CT THORAX DX C-: CPT

## 2018-05-05 PROCEDURE — 25010000002 ENOXAPARIN PER 10 MG: Performed by: FAMILY MEDICINE

## 2018-05-05 PROCEDURE — 85025 COMPLETE CBC W/AUTO DIFF WBC: CPT | Performed by: INTERNAL MEDICINE

## 2018-05-05 PROCEDURE — 82306 VITAMIN D 25 HYDROXY: CPT | Performed by: INTERNAL MEDICINE

## 2018-05-05 PROCEDURE — 80053 COMPREHEN METABOLIC PANEL: CPT | Performed by: INTERNAL MEDICINE

## 2018-05-05 PROCEDURE — 84443 ASSAY THYROID STIM HORMONE: CPT | Performed by: INTERNAL MEDICINE

## 2018-05-05 PROCEDURE — 82962 GLUCOSE BLOOD TEST: CPT

## 2018-05-05 RX ORDER — SIMVASTATIN 40 MG
40 TABLET ORAL DAILY
COMMUNITY

## 2018-05-05 RX ORDER — ESOMEPRAZOLE MAGNESIUM 40 MG/1
40 CAPSULE, DELAYED RELEASE ORAL 2 TIMES DAILY
COMMUNITY
End: 2018-06-12 | Stop reason: HOSPADM

## 2018-05-05 RX ORDER — LOSARTAN POTASSIUM 100 MG/1
100 TABLET ORAL DAILY
COMMUNITY
End: 2018-05-23 | Stop reason: HOSPADM

## 2018-05-05 RX ORDER — HYDROXYZINE HYDROCHLORIDE 10 MG/1
10 TABLET, FILM COATED ORAL EVERY 8 HOURS PRN
Status: DISCONTINUED | OUTPATIENT
Start: 2018-05-05 | End: 2018-05-23 | Stop reason: HOSPADM

## 2018-05-05 RX ORDER — TRAMADOL HYDROCHLORIDE 50 MG/1
50 TABLET ORAL EVERY 6 HOURS PRN
Status: DISPENSED | OUTPATIENT
Start: 2018-05-05 | End: 2018-05-15

## 2018-05-05 RX ORDER — THIAMINE MONONITRATE (VIT B1) 100 MG
100 TABLET ORAL DAILY
COMMUNITY

## 2018-05-05 RX ORDER — ESCITALOPRAM OXALATE 10 MG/1
20 TABLET ORAL DAILY
Status: DISCONTINUED | OUTPATIENT
Start: 2018-05-05 | End: 2018-05-23 | Stop reason: HOSPADM

## 2018-05-05 RX ORDER — FUROSEMIDE 20 MG/1
20 TABLET ORAL EVERY OTHER DAY
COMMUNITY

## 2018-05-05 RX ORDER — METOPROLOL SUCCINATE 50 MG/1
50 TABLET, EXTENDED RELEASE ORAL DAILY
COMMUNITY
End: 2018-05-23 | Stop reason: HOSPADM

## 2018-05-05 RX ORDER — ALUMINA, MAGNESIA, AND SIMETHICONE 2400; 2400; 240 MG/30ML; MG/30ML; MG/30ML
15 SUSPENSION ORAL 2 TIMES DAILY PRN
Status: DISCONTINUED | OUTPATIENT
Start: 2018-05-05 | End: 2018-05-23 | Stop reason: HOSPADM

## 2018-05-05 RX ORDER — HYDROCHLOROTHIAZIDE 12.5 MG/1
12.5 CAPSULE, GELATIN COATED ORAL DAILY
COMMUNITY
End: 2018-06-12 | Stop reason: HOSPADM

## 2018-05-05 RX ORDER — ESCITALOPRAM OXALATE 20 MG/1
20 TABLET ORAL DAILY
COMMUNITY

## 2018-05-05 RX ORDER — MAGNESIUM OXIDE 400 MG/1
400 TABLET ORAL DAILY
COMMUNITY

## 2018-05-05 RX ADMIN — DONEPEZIL HYDROCHLORIDE 10 MG: 10 TABLET, FILM COATED ORAL at 20:13

## 2018-05-05 RX ADMIN — ACETAMINOPHEN 650 MG: 325 TABLET ORAL at 18:43

## 2018-05-05 RX ADMIN — TAZOBACTAM SODIUM AND PIPERACILLIN SODIUM 2.25 G: 250; 2 INJECTION, SOLUTION INTRAVENOUS at 01:56

## 2018-05-05 RX ADMIN — FUROSEMIDE 20 MG: 20 TABLET ORAL at 08:55

## 2018-05-05 RX ADMIN — TAZOBACTAM SODIUM AND PIPERACILLIN SODIUM 2.25 G: 250; 2 INJECTION, SOLUTION INTRAVENOUS at 14:51

## 2018-05-05 RX ADMIN — CALCITONIN SALMON 100 UNITS: 200 INJECTION, SOLUTION INTRAMUSCULAR; SUBCUTANEOUS at 10:26

## 2018-05-05 RX ADMIN — ESCITSLOPRAM 20 MG: 10 TABLET ORAL at 18:50

## 2018-05-05 RX ADMIN — ALUMINUM HYDROXIDE, MAGNESIUM HYDROXIDE, AND DIMETHICONE 15 ML: 400; 400; 40 SUSPENSION ORAL at 18:43

## 2018-05-05 RX ADMIN — Medication 250 MG: at 20:13

## 2018-05-05 RX ADMIN — HYDROCODONE BITARTRATE AND ACETAMINOPHEN 1 TABLET: 5; 325 TABLET ORAL at 08:55

## 2018-05-05 RX ADMIN — ENOXAPARIN SODIUM 40 MG: 40 INJECTION SUBCUTANEOUS at 18:45

## 2018-05-05 RX ADMIN — Medication 250 MG: at 08:55

## 2018-05-05 RX ADMIN — MEMANTINE HYDROCHLORIDE 10 MG: 5 TABLET, FILM COATED ORAL at 08:55

## 2018-05-05 RX ADMIN — HYDROXYZINE HYDROCHLORIDE 10 MG: 10 TABLET ORAL at 18:50

## 2018-05-05 RX ADMIN — TRAMADOL HYDROCHLORIDE 50 MG: 50 TABLET, COATED ORAL at 20:13

## 2018-05-05 RX ADMIN — TAZOBACTAM SODIUM AND PIPERACILLIN SODIUM 2.25 G: 250; 2 INJECTION, SOLUTION INTRAVENOUS at 08:55

## 2018-05-06 ENCOUNTER — APPOINTMENT (OUTPATIENT)
Dept: ULTRASOUND IMAGING | Facility: HOSPITAL | Age: 73
End: 2018-05-06

## 2018-05-06 ENCOUNTER — APPOINTMENT (OUTPATIENT)
Dept: CT IMAGING | Facility: HOSPITAL | Age: 73
End: 2018-05-06

## 2018-05-06 LAB
ANION GAP SERPL CALCULATED.3IONS-SCNC: 15 MMOL/L (ref 4–13)
ANISOCYTOSIS BLD QL: ABNORMAL
BASOPHILS # BLD MANUAL: 0.45 10*3/MM3 (ref 0–0.2)
BASOPHILS NFR BLD AUTO: 2 % (ref 0–2)
BUN BLD-MCNC: 26 MG/DL (ref 5–21)
BUN/CREAT SERPL: 19.1 (ref 7–25)
CALCIUM SPEC-SCNC: 10.7 MG/DL (ref 8.4–10.4)
CHLORIDE SERPL-SCNC: 107 MMOL/L (ref 98–110)
CO2 SERPL-SCNC: 20 MMOL/L (ref 24–31)
CREAT BLD-MCNC: 1.36 MG/DL (ref 0.5–1.4)
DEPRECATED RDW RBC AUTO: 52.8 FL (ref 40–54)
ERYTHROCYTE [DISTWIDTH] IN BLOOD BY AUTOMATED COUNT: 16.3 % (ref 12–15)
GFR SERPL CREATININE-BSD FRML MDRD: 38 ML/MIN/1.73
GIANT PLATELETS: ABNORMAL
GLUCOSE BLD-MCNC: 194 MG/DL (ref 70–100)
GLUCOSE BLDC GLUCOMTR-MCNC: 115 MG/DL (ref 70–130)
GLUCOSE BLDC GLUCOMTR-MCNC: 128 MG/DL (ref 70–130)
GLUCOSE BLDC GLUCOMTR-MCNC: 166 MG/DL (ref 70–130)
GLUCOSE BLDC GLUCOMTR-MCNC: 201 MG/DL (ref 70–130)
HCT VFR BLD AUTO: 42 % (ref 37–47)
HGB BLD-MCNC: 13.4 G/DL (ref 12–16)
LYMPHOCYTES # BLD MANUAL: 7.47 10*3/MM3 (ref 0.72–4.86)
LYMPHOCYTES NFR BLD MANUAL: 3 % (ref 4–12)
LYMPHOCYTES NFR BLD MANUAL: 33.3 % (ref 15–45)
MCH RBC QN AUTO: 28.9 PG (ref 28–32)
MCHC RBC AUTO-ENTMCNC: 31.9 G/DL (ref 33–36)
MCV RBC AUTO: 90.5 FL (ref 82–98)
MONOCYTES # BLD AUTO: 0.67 10*3/MM3 (ref 0.19–1.3)
NEUTROPHILS # BLD AUTO: 8.16 10*3/MM3 (ref 1.87–8.4)
NEUTROPHILS NFR BLD MANUAL: 36.4 % (ref 39–78)
NRBC BLD MANUAL-RTO: 0.1 /100 WBC (ref 0–0)
PLATELET # BLD AUTO: 441 10*3/MM3 (ref 130–400)
PMV BLD AUTO: 12.5 FL (ref 6–12)
POTASSIUM BLD-SCNC: 4.5 MMOL/L (ref 3.5–5.3)
RBC # BLD AUTO: 4.64 10*6/MM3 (ref 4.2–5.4)
SMALL PLATELETS BLD QL SMEAR: ABNORMAL
SODIUM BLD-SCNC: 142 MMOL/L (ref 135–145)
TARGETS BLD QL SMEAR: ABNORMAL
VARIANT LYMPHS NFR BLD MANUAL: 25.3 % (ref 0–5)
WBC MORPH BLD: NORMAL
WBC NRBC COR # BLD: 22.42 10*3/MM3 (ref 4.8–10.8)

## 2018-05-06 PROCEDURE — 85007 BL SMEAR W/DIFF WBC COUNT: CPT | Performed by: FAMILY MEDICINE

## 2018-05-06 PROCEDURE — 63710000001 INSULIN LISPRO (HUMAN) PER 5 UNITS: Performed by: INTERNAL MEDICINE

## 2018-05-06 PROCEDURE — 80048 BASIC METABOLIC PNL TOTAL CA: CPT | Performed by: FAMILY MEDICINE

## 2018-05-06 PROCEDURE — 63710000001 INSULIN DETEMIR PER 5 UNITS: Performed by: INTERNAL MEDICINE

## 2018-05-06 PROCEDURE — 82962 GLUCOSE BLOOD TEST: CPT

## 2018-05-06 PROCEDURE — 25010000002 MEROPENEM PER 100 MG: Performed by: FAMILY MEDICINE

## 2018-05-06 PROCEDURE — 25010000002 ENOXAPARIN PER 10 MG: Performed by: FAMILY MEDICINE

## 2018-05-06 PROCEDURE — 76705 ECHO EXAM OF ABDOMEN: CPT

## 2018-05-06 PROCEDURE — 25010000002 PIPERACILLIN SOD-TAZOBACTAM PER 1 G: Performed by: INTERNAL MEDICINE

## 2018-05-06 PROCEDURE — 85025 COMPLETE CBC W/AUTO DIFF WBC: CPT | Performed by: FAMILY MEDICINE

## 2018-05-06 PROCEDURE — 74176 CT ABD & PELVIS W/O CONTRAST: CPT

## 2018-05-06 PROCEDURE — 85060 BLOOD SMEAR INTERPRETATION: CPT | Performed by: FAMILY MEDICINE

## 2018-05-06 RX ORDER — FAMOTIDINE 10 MG/ML
20 INJECTION, SOLUTION INTRAVENOUS DAILY
Status: DISCONTINUED | OUTPATIENT
Start: 2018-05-06 | End: 2018-05-08

## 2018-05-06 RX ADMIN — ENOXAPARIN SODIUM 40 MG: 40 INJECTION SUBCUTANEOUS at 18:05

## 2018-05-06 RX ADMIN — SODIUM CHLORIDE 75 ML/HR: 9 INJECTION, SOLUTION INTRAVENOUS at 18:56

## 2018-05-06 RX ADMIN — INSULIN LISPRO 4 UNITS: 100 INJECTION, SOLUTION INTRAVENOUS; SUBCUTANEOUS at 21:14

## 2018-05-06 RX ADMIN — FUROSEMIDE 20 MG: 20 TABLET ORAL at 09:42

## 2018-05-06 RX ADMIN — MEROPENEM 1 G: 1 INJECTION, POWDER, FOR SOLUTION INTRAVENOUS at 14:32

## 2018-05-06 RX ADMIN — INSULIN DETEMIR 40 UNITS: 100 INJECTION, SOLUTION SUBCUTANEOUS at 21:14

## 2018-05-06 RX ADMIN — INSULIN DETEMIR 40 UNITS: 100 INJECTION, SOLUTION SUBCUTANEOUS at 09:43

## 2018-05-06 RX ADMIN — MEROPENEM 1 G: 1 INJECTION, POWDER, FOR SOLUTION INTRAVENOUS at 21:15

## 2018-05-06 RX ADMIN — HYDROXYZINE HYDROCHLORIDE 10 MG: 10 TABLET ORAL at 10:23

## 2018-05-06 RX ADMIN — Medication 250 MG: at 21:14

## 2018-05-06 RX ADMIN — ESCITSLOPRAM 20 MG: 10 TABLET ORAL at 09:42

## 2018-05-06 RX ADMIN — TAZOBACTAM SODIUM AND PIPERACILLIN SODIUM 2.25 G: 250; 2 INJECTION, SOLUTION INTRAVENOUS at 09:42

## 2018-05-06 RX ADMIN — TAZOBACTAM SODIUM AND PIPERACILLIN SODIUM 2.25 G: 250; 2 INJECTION, SOLUTION INTRAVENOUS at 00:02

## 2018-05-06 RX ADMIN — FAMOTIDINE 20 MG: 10 INJECTION INTRAVENOUS at 10:39

## 2018-05-06 RX ADMIN — Medication 250 MG: at 09:42

## 2018-05-06 RX ADMIN — DONEPEZIL HYDROCHLORIDE 10 MG: 10 TABLET, FILM COATED ORAL at 21:14

## 2018-05-06 RX ADMIN — MEMANTINE HYDROCHLORIDE 10 MG: 5 TABLET, FILM COATED ORAL at 09:42

## 2018-05-06 RX ADMIN — SODIUM CHLORIDE 75 ML/HR: 9 INJECTION, SOLUTION INTRAVENOUS at 00:02

## 2018-05-07 LAB
ANION GAP SERPL CALCULATED.3IONS-SCNC: 10 MMOL/L (ref 4–13)
BACTERIA SPEC AEROBE CULT: ABNORMAL
BUN BLD-MCNC: 22 MG/DL (ref 5–21)
BUN/CREAT SERPL: 18.5 (ref 7–25)
CALCIUM SPEC-SCNC: 10.4 MG/DL (ref 8.4–10.4)
CHLORIDE SERPL-SCNC: 109 MMOL/L (ref 98–110)
CO2 SERPL-SCNC: 24 MMOL/L (ref 24–31)
CREAT BLD-MCNC: 1.19 MG/DL (ref 0.5–1.4)
CYTOLOGIST CVX/VAG CYTO: NORMAL
DEPRECATED RDW RBC AUTO: 53.8 FL (ref 40–54)
EOSINOPHIL # BLD MANUAL: 0.26 10*3/MM3 (ref 0–0.7)
EOSINOPHIL NFR BLD MANUAL: 1 % (ref 0–4)
ERYTHROCYTE [DISTWIDTH] IN BLOOD BY AUTOMATED COUNT: 16.2 % (ref 12–15)
GFR SERPL CREATININE-BSD FRML MDRD: 44 ML/MIN/1.73
GLUCOSE BLD-MCNC: 51 MG/DL (ref 70–100)
GLUCOSE BLDC GLUCOMTR-MCNC: 151 MG/DL (ref 70–130)
GLUCOSE BLDC GLUCOMTR-MCNC: 59 MG/DL (ref 70–130)
GLUCOSE BLDC GLUCOMTR-MCNC: 64 MG/DL (ref 70–130)
GLUCOSE BLDC GLUCOMTR-MCNC: 84 MG/DL (ref 70–130)
GLUCOSE BLDC GLUCOMTR-MCNC: 85 MG/DL (ref 70–130)
GRAM STN SPEC: ABNORMAL
HCT VFR BLD AUTO: 43.3 % (ref 37–47)
HGB BLD-MCNC: 13.6 G/DL (ref 12–16)
ISOLATED FROM: ABNORMAL
ISOLATED FROM: ABNORMAL
LYMPHOCYTES # BLD MANUAL: 16.88 10*3/MM3 (ref 0.72–4.86)
LYMPHOCYTES NFR BLD MANUAL: 5 % (ref 4–12)
LYMPHOCYTES NFR BLD MANUAL: 66 % (ref 15–45)
MCH RBC QN AUTO: 28.6 PG (ref 28–32)
MCHC RBC AUTO-ENTMCNC: 31.4 G/DL (ref 33–36)
MCV RBC AUTO: 91 FL (ref 82–98)
MONOCYTES # BLD AUTO: 1.28 10*3/MM3 (ref 0.19–1.3)
NEUTROPHILS # BLD AUTO: 6.91 10*3/MM3 (ref 1.87–8.4)
NEUTROPHILS NFR BLD MANUAL: 27 % (ref 39–78)
NRBC BLD MANUAL-RTO: 0.1 /100 WBC (ref 0–0)
PATH INTERP BLD-IMP: NORMAL
PLAT MORPH BLD: NORMAL
PLATELET # BLD AUTO: 379 10*3/MM3 (ref 130–400)
PMV BLD AUTO: 13.4 FL (ref 6–12)
POIKILOCYTOSIS BLD QL SMEAR: ABNORMAL
POTASSIUM BLD-SCNC: 3.7 MMOL/L (ref 3.5–5.3)
RBC # BLD AUTO: 4.76 10*6/MM3 (ref 4.2–5.4)
SODIUM BLD-SCNC: 143 MMOL/L (ref 135–145)
VARIANT LYMPHS NFR BLD MANUAL: 1 % (ref 0–5)
WBC MORPH BLD: NORMAL
WBC NRBC COR # BLD: 25.58 10*3/MM3 (ref 4.8–10.8)

## 2018-05-07 PROCEDURE — G8996 SWALLOW CURRENT STATUS: HCPCS

## 2018-05-07 PROCEDURE — 25010000002 ENOXAPARIN PER 10 MG: Performed by: FAMILY MEDICINE

## 2018-05-07 PROCEDURE — G8998 SWALLOW D/C STATUS: HCPCS

## 2018-05-07 PROCEDURE — G8997 SWALLOW GOAL STATUS: HCPCS

## 2018-05-07 PROCEDURE — 87040 BLOOD CULTURE FOR BACTERIA: CPT | Performed by: FAMILY MEDICINE

## 2018-05-07 PROCEDURE — 92610 EVALUATE SWALLOWING FUNCTION: CPT

## 2018-05-07 PROCEDURE — 85007 BL SMEAR W/DIFF WBC COUNT: CPT | Performed by: FAMILY MEDICINE

## 2018-05-07 PROCEDURE — 85025 COMPLETE CBC W/AUTO DIFF WBC: CPT | Performed by: FAMILY MEDICINE

## 2018-05-07 PROCEDURE — 80048 BASIC METABOLIC PNL TOTAL CA: CPT | Performed by: FAMILY MEDICINE

## 2018-05-07 PROCEDURE — 25010000002 PIPERACILLIN SOD-TAZOBACTAM PER 1 G: Performed by: FAMILY MEDICINE

## 2018-05-07 PROCEDURE — 25010000002 MEROPENEM PER 100 MG: Performed by: FAMILY MEDICINE

## 2018-05-07 PROCEDURE — 63710000001 INSULIN DETEMIR PER 5 UNITS: Performed by: FAMILY MEDICINE

## 2018-05-07 PROCEDURE — 82962 GLUCOSE BLOOD TEST: CPT

## 2018-05-07 RX ADMIN — HYDROXYZINE HYDROCHLORIDE 10 MG: 10 TABLET ORAL at 21:56

## 2018-05-07 RX ADMIN — HYDROXYZINE HYDROCHLORIDE 10 MG: 10 TABLET ORAL at 10:30

## 2018-05-07 RX ADMIN — MEMANTINE HYDROCHLORIDE 10 MG: 5 TABLET, FILM COATED ORAL at 08:28

## 2018-05-07 RX ADMIN — FUROSEMIDE 20 MG: 20 TABLET ORAL at 08:28

## 2018-05-07 RX ADMIN — Medication 250 MG: at 08:28

## 2018-05-07 RX ADMIN — ENOXAPARIN SODIUM 40 MG: 40 INJECTION SUBCUTANEOUS at 17:51

## 2018-05-07 RX ADMIN — SODIUM CHLORIDE 75 ML/HR: 9 INJECTION, SOLUTION INTRAVENOUS at 08:28

## 2018-05-07 RX ADMIN — FAMOTIDINE 20 MG: 10 INJECTION INTRAVENOUS at 08:28

## 2018-05-07 RX ADMIN — INSULIN DETEMIR 30 UNITS: 100 INJECTION, SOLUTION SUBCUTANEOUS at 10:30

## 2018-05-07 RX ADMIN — TRAMADOL HYDROCHLORIDE 50 MG: 50 TABLET, COATED ORAL at 21:56

## 2018-05-07 RX ADMIN — TAZOBACTAM SODIUM AND PIPERACILLIN SODIUM 3.38 G: 375; 3 INJECTION, SOLUTION INTRAVENOUS at 11:28

## 2018-05-07 RX ADMIN — ESCITSLOPRAM 20 MG: 10 TABLET ORAL at 08:28

## 2018-05-07 RX ADMIN — TRAMADOL HYDROCHLORIDE 50 MG: 50 TABLET, COATED ORAL at 09:15

## 2018-05-07 RX ADMIN — Medication 250 MG: at 20:42

## 2018-05-07 RX ADMIN — DONEPEZIL HYDROCHLORIDE 10 MG: 10 TABLET, FILM COATED ORAL at 20:42

## 2018-05-07 RX ADMIN — MEROPENEM 1 G: 1 INJECTION, POWDER, FOR SOLUTION INTRAVENOUS at 05:54

## 2018-05-07 RX ADMIN — TAZOBACTAM SODIUM AND PIPERACILLIN SODIUM 3.38 G: 375; 3 INJECTION, SOLUTION INTRAVENOUS at 20:43

## 2018-05-08 LAB
CYTOLOGIST CVX/VAG CYTO: NORMAL
GLUCOSE BLDC GLUCOMTR-MCNC: 115 MG/DL (ref 70–130)
GLUCOSE BLDC GLUCOMTR-MCNC: 122 MG/DL (ref 70–130)
GLUCOSE BLDC GLUCOMTR-MCNC: 132 MG/DL (ref 70–130)
GLUCOSE BLDC GLUCOMTR-MCNC: 152 MG/DL (ref 70–130)
GLUCOSE BLDC GLUCOMTR-MCNC: 195 MG/DL (ref 70–130)
PATH INTERP BLD-IMP: NORMAL

## 2018-05-08 PROCEDURE — 88185 FLOWCYTOMETRY/TC ADD-ON: CPT | Performed by: INTERNAL MEDICINE

## 2018-05-08 PROCEDURE — 25010000002 ENOXAPARIN PER 10 MG: Performed by: FAMILY MEDICINE

## 2018-05-08 PROCEDURE — 81342 TRG GENE REARRANGEMENT ANAL: CPT

## 2018-05-08 PROCEDURE — 99223 1ST HOSP IP/OBS HIGH 75: CPT | Performed by: PSYCHIATRY & NEUROLOGY

## 2018-05-08 PROCEDURE — 82962 GLUCOSE BLOOD TEST: CPT

## 2018-05-08 PROCEDURE — 88189 FLOWCYTOMETRY/READ 16 & >: CPT | Performed by: INTERNAL MEDICINE

## 2018-05-08 PROCEDURE — 63710000001 INSULIN LISPRO (HUMAN) PER 5 UNITS: Performed by: INTERNAL MEDICINE

## 2018-05-08 PROCEDURE — 88184 FLOWCYTOMETRY/ TC 1 MARKER: CPT | Performed by: INTERNAL MEDICINE

## 2018-05-08 PROCEDURE — 25010000002 PIPERACILLIN SOD-TAZOBACTAM PER 1 G: Performed by: FAMILY MEDICINE

## 2018-05-08 PROCEDURE — 25010000002 THIAMINE PER 100 MG: Performed by: PSYCHIATRY & NEUROLOGY

## 2018-05-08 PROCEDURE — 85060 BLOOD SMEAR INTERPRETATION: CPT | Performed by: INTERNAL MEDICINE

## 2018-05-08 RX ORDER — THIAMINE MONONITRATE (VIT B1) 100 MG
100 TABLET ORAL DAILY
Status: DISCONTINUED | OUTPATIENT
Start: 2018-05-08 | End: 2018-05-09

## 2018-05-08 RX ORDER — MELATONIN
5000 DAILY
Status: DISCONTINUED | OUTPATIENT
Start: 2018-05-08 | End: 2018-05-09

## 2018-05-08 RX ORDER — MELATONIN
1000 DAILY
Status: DISCONTINUED | OUTPATIENT
Start: 2018-05-08 | End: 2018-05-08

## 2018-05-08 RX ORDER — ATORVASTATIN CALCIUM 10 MG/1
10 TABLET, FILM COATED ORAL NIGHTLY
Status: DISCONTINUED | OUTPATIENT
Start: 2018-05-08 | End: 2018-05-23 | Stop reason: HOSPADM

## 2018-05-08 RX ADMIN — INSULIN LISPRO 2 UNITS: 100 INJECTION, SOLUTION INTRAVENOUS; SUBCUTANEOUS at 17:02

## 2018-05-08 RX ADMIN — CHOLECALCIFEROL (VITAMIN D3) 25 MCG (1,000 UNIT) TABLET 1000 UNITS: TABLET at 10:43

## 2018-05-08 RX ADMIN — ENOXAPARIN SODIUM 40 MG: 40 INJECTION SUBCUTANEOUS at 17:02

## 2018-05-08 RX ADMIN — THIAMINE HYDROCHLORIDE 100 MG: 100 INJECTION, SOLUTION INTRAMUSCULAR; INTRAVENOUS at 21:10

## 2018-05-08 RX ADMIN — ATORVASTATIN CALCIUM 10 MG: 10 TABLET, FILM COATED ORAL at 20:46

## 2018-05-08 RX ADMIN — TAZOBACTAM SODIUM AND PIPERACILLIN SODIUM 3.38 G: 375; 3 INJECTION, SOLUTION INTRAVENOUS at 03:28

## 2018-05-08 RX ADMIN — CHOLECALCIFEROL (VITAMIN D3) 25 MCG (1,000 UNIT) TABLET 5000 UNITS: TABLET at 22:06

## 2018-05-08 RX ADMIN — FAMOTIDINE 20 MG: 10 INJECTION INTRAVENOUS at 08:32

## 2018-05-08 RX ADMIN — INSULIN LISPRO 2 UNITS: 100 INJECTION, SOLUTION INTRAVENOUS; SUBCUTANEOUS at 11:42

## 2018-05-08 RX ADMIN — FUROSEMIDE 20 MG: 20 TABLET ORAL at 08:28

## 2018-05-08 RX ADMIN — MEMANTINE HYDROCHLORIDE 10 MG: 5 TABLET, FILM COATED ORAL at 08:28

## 2018-05-08 RX ADMIN — ESCITSLOPRAM 20 MG: 10 TABLET ORAL at 08:27

## 2018-05-08 RX ADMIN — DONEPEZIL HYDROCHLORIDE 10 MG: 10 TABLET, FILM COATED ORAL at 20:46

## 2018-05-08 RX ADMIN — Medication 100 MG: at 10:43

## 2018-05-08 RX ADMIN — Medication 250 MG: at 20:46

## 2018-05-08 RX ADMIN — Medication 250 MG: at 08:28

## 2018-05-09 ENCOUNTER — APPOINTMENT (OUTPATIENT)
Dept: CARDIOLOGY | Facility: HOSPITAL | Age: 73
End: 2018-05-09
Attending: INTERNAL MEDICINE

## 2018-05-09 LAB
ALBUMIN SERPL-MCNC: 3.2 G/DL (ref 3.5–5)
ALBUMIN/GLOB SERPL: 0.9 G/DL (ref 1.1–2.5)
ALP SERPL-CCNC: 177 U/L (ref 24–120)
ALT SERPL W P-5'-P-CCNC: 26 U/L (ref 0–54)
ANION GAP SERPL CALCULATED.3IONS-SCNC: 12 MMOL/L (ref 4–13)
ANISOCYTOSIS BLD QL: ABNORMAL
AST SERPL-CCNC: 126 U/L (ref 7–45)
BH CV ECHO MEAS - AO MAX PG (FULL): 9 MMHG
BH CV ECHO MEAS - AO MAX PG: 18.3 MMHG
BH CV ECHO MEAS - AO MEAN PG (FULL): 4 MMHG
BH CV ECHO MEAS - AO MEAN PG: 9 MMHG
BH CV ECHO MEAS - AO ROOT AREA (BSA CORRECTED): 1.4
BH CV ECHO MEAS - AO ROOT AREA: 7.1 CM^2
BH CV ECHO MEAS - AO ROOT DIAM: 3 CM
BH CV ECHO MEAS - AO V2 MAX: 214 CM/SEC
BH CV ECHO MEAS - AO V2 MEAN: 141 CM/SEC
BH CV ECHO MEAS - AO V2 VTI: 45.5 CM
BH CV ECHO MEAS - AVA(I,A): 2.2 CM^2
BH CV ECHO MEAS - AVA(I,D): 2.2 CM^2
BH CV ECHO MEAS - AVA(V,A): 2 CM^2
BH CV ECHO MEAS - AVA(V,D): 2 CM^2
BH CV ECHO MEAS - BSA(HAYCOCK): 2.2 M^2
BH CV ECHO MEAS - BSA: 2.1 M^2
BH CV ECHO MEAS - BZI_BMI: 33.4 KILOGRAMS/M^2
BH CV ECHO MEAS - BZI_METRIC_HEIGHT: 170.2 CM
BH CV ECHO MEAS - BZI_METRIC_WEIGHT: 96.6 KG
BH CV ECHO MEAS - CONTRAST EF 4CH: 67.7 ML/M^2
BH CV ECHO MEAS - EDV(CUBED): 96.7 ML
BH CV ECHO MEAS - EDV(MOD-SP4): 96.6 ML
BH CV ECHO MEAS - EDV(TEICH): 96.8 ML
BH CV ECHO MEAS - EF(CUBED): 86.6 %
BH CV ECHO MEAS - EF(MOD-SP4): 67.7 %
BH CV ECHO MEAS - EF(TEICH): 80.3 %
BH CV ECHO MEAS - ESV(CUBED): 13 ML
BH CV ECHO MEAS - ESV(MOD-SP4): 31.2 ML
BH CV ECHO MEAS - ESV(TEICH): 19.1 ML
BH CV ECHO MEAS - FS: 48.8 %
BH CV ECHO MEAS - IVS/LVPW: 1.5
BH CV ECHO MEAS - IVSD: 1.4 CM
BH CV ECHO MEAS - LA DIMENSION: 3.1 CM
BH CV ECHO MEAS - LA/AO: 1
BH CV ECHO MEAS - LAT PEAK E' VEL: 8.9 CM/SEC
BH CV ECHO MEAS - LV DIASTOLIC VOL/BSA (35-75): 46.5 ML/M^2
BH CV ECHO MEAS - LV MASS(C)D: 189 GRAMS
BH CV ECHO MEAS - LV MASS(C)DI: 91 GRAMS/M^2
BH CV ECHO MEAS - LV MAX PG: 9.3 MMHG
BH CV ECHO MEAS - LV MEAN PG: 5 MMHG
BH CV ECHO MEAS - LV SYSTOLIC VOL/BSA (12-30): 15 ML/M^2
BH CV ECHO MEAS - LV V1 MAX: 152.5 CM/SEC
BH CV ECHO MEAS - LV V1 MEAN: 99.4 CM/SEC
BH CV ECHO MEAS - LV V1 VTI: 36 CM
BH CV ECHO MEAS - LVIDD: 4.6 CM
BH CV ECHO MEAS - LVIDS: 2.4 CM
BH CV ECHO MEAS - LVLD AP4: 8.3 CM
BH CV ECHO MEAS - LVLS AP4: 7.2 CM
BH CV ECHO MEAS - LVOT AREA (M): 2.8 CM^2
BH CV ECHO MEAS - LVOT AREA: 2.8 CM^2
BH CV ECHO MEAS - LVOT DIAM: 1.9 CM
BH CV ECHO MEAS - LVPWD: 0.9 CM
BH CV ECHO MEAS - MED PEAK E' VEL: 5.66 CM/SEC
BH CV ECHO MEAS - MV A MAX VEL: 150 CM/SEC
BH CV ECHO MEAS - MV DEC SLOPE: 268 CM/SEC^2
BH CV ECHO MEAS - MV DEC TIME: 0.32 SEC
BH CV ECHO MEAS - MV E MAX VEL: 103 CM/SEC
BH CV ECHO MEAS - MV E/A: 0.69
BH CV ECHO MEAS - MV MAX PG: 8.9 MMHG
BH CV ECHO MEAS - MV MEAN PG: 3 MMHG
BH CV ECHO MEAS - MV P1/2T MAX VEL: 106 CM/SEC
BH CV ECHO MEAS - MV P1/2T: 115.8 MSEC
BH CV ECHO MEAS - MV V2 MAX: 149 CM/SEC
BH CV ECHO MEAS - MV V2 MEAN: 78.1 CM/SEC
BH CV ECHO MEAS - MV V2 VTI: 46.6 CM
BH CV ECHO MEAS - MVA P1/2T LCG: 2.1 CM^2
BH CV ECHO MEAS - MVA(P1/2T): 1.9 CM^2
BH CV ECHO MEAS - MVA(VTI): 2.2 CM^2
BH CV ECHO MEAS - PA MAX PG: 4.7 MMHG
BH CV ECHO MEAS - PA V2 MAX: 108 CM/SEC
BH CV ECHO MEAS - PI END-D VEL: 126 CM/SEC
BH CV ECHO MEAS - RAP SYSTOLE: 5 MMHG
BH CV ECHO MEAS - RVSP: 25.3 MMHG
BH CV ECHO MEAS - SI(AO): 154.9 ML/M^2
BH CV ECHO MEAS - SI(CUBED): 40.3 ML/M^2
BH CV ECHO MEAS - SI(LVOT): 49.1 ML/M^2
BH CV ECHO MEAS - SI(MOD-SP4): 31.5 ML/M^2
BH CV ECHO MEAS - SI(TEICH): 37.4 ML/M^2
BH CV ECHO MEAS - SV(AO): 321.6 ML
BH CV ECHO MEAS - SV(CUBED): 83.7 ML
BH CV ECHO MEAS - SV(LVOT): 102.1 ML
BH CV ECHO MEAS - SV(MOD-SP4): 65.4 ML
BH CV ECHO MEAS - SV(TEICH): 77.7 ML
BH CV ECHO MEAS - TR MAX VEL: 225 CM/SEC
BH CV ECHO MEASUREMENTS AVERAGE E/E' RATIO: 14.15
BILIRUB SERPL-MCNC: 0.7 MG/DL (ref 0.1–1)
BUN BLD-MCNC: 18 MG/DL (ref 5–21)
BUN/CREAT SERPL: 17.6 (ref 7–25)
CALCIUM SPEC-SCNC: 11.7 MG/DL (ref 8.4–10.4)
CHLORIDE SERPL-SCNC: 106 MMOL/L (ref 98–110)
CO2 SERPL-SCNC: 24 MMOL/L (ref 24–31)
CREAT BLD-MCNC: 1.02 MG/DL (ref 0.5–1.4)
DEPRECATED RDW RBC AUTO: 54.9 FL (ref 40–54)
EOSINOPHIL # BLD MANUAL: 0.28 10*3/MM3 (ref 0–0.7)
EOSINOPHIL NFR BLD MANUAL: 1 % (ref 0–4)
ERYTHROCYTE [DISTWIDTH] IN BLOOD BY AUTOMATED COUNT: 16.6 % (ref 12–15)
GFR SERPL CREATININE-BSD FRML MDRD: 53 ML/MIN/1.73
GLOBULIN UR ELPH-MCNC: 3.6 GM/DL
GLUCOSE BLD-MCNC: 149 MG/DL (ref 70–100)
GLUCOSE BLDC GLUCOMTR-MCNC: 105 MG/DL (ref 70–130)
GLUCOSE BLDC GLUCOMTR-MCNC: 151 MG/DL (ref 70–130)
GLUCOSE BLDC GLUCOMTR-MCNC: 168 MG/DL (ref 70–130)
GLUCOSE BLDC GLUCOMTR-MCNC: 200 MG/DL (ref 70–130)
HCT VFR BLD AUTO: 42.6 % (ref 37–47)
HGB BLD-MCNC: 13.3 G/DL (ref 12–16)
LEFT ATRIUM VOLUME INDEX: 29 ML/M2
LEFT ATRIUM VOLUME: 60.3 CM3
LV EF 2D ECHO EST: 60 %
LYMPHOCYTES # BLD MANUAL: 15.96 10*3/MM3 (ref 0.72–4.86)
LYMPHOCYTES NFR BLD MANUAL: 10 % (ref 4–12)
LYMPHOCYTES NFR BLD MANUAL: 58 % (ref 15–45)
MAXIMAL PREDICTED HEART RATE: 147 BPM
MCH RBC QN AUTO: 28.7 PG (ref 28–32)
MCHC RBC AUTO-ENTMCNC: 31.2 G/DL (ref 33–36)
MCV RBC AUTO: 92 FL (ref 82–98)
MONOCYTES # BLD AUTO: 2.75 10*3/MM3 (ref 0.19–1.3)
NEUTROPHILS # BLD AUTO: 7.98 10*3/MM3 (ref 1.87–8.4)
NEUTROPHILS NFR BLD MANUAL: 26 % (ref 39–78)
NEUTS BAND NFR BLD MANUAL: 3 % (ref 0–10)
NRBC BLD MANUAL-RTO: 0.1 /100 WBC (ref 0–0)
PLATELET # BLD AUTO: 440 10*3/MM3 (ref 130–400)
PMV BLD AUTO: 12.9 FL (ref 6–12)
POTASSIUM BLD-SCNC: 4.1 MMOL/L (ref 3.5–5.3)
PROT SERPL-MCNC: 6.8 G/DL (ref 6.3–8.7)
RBC # BLD AUTO: 4.63 10*6/MM3 (ref 4.2–5.4)
SMALL PLATELETS BLD QL SMEAR: ABNORMAL
SODIUM BLD-SCNC: 142 MMOL/L (ref 135–145)
SODIUM UR-SCNC: 142 MMOL/L (ref 30–90)
STRESS TARGET HR: 125 BPM
VARIANT LYMPHS NFR BLD MANUAL: 2 % (ref 0–5)
WBC MORPH BLD: NORMAL
WBC NRBC COR # BLD: 27.51 10*3/MM3 (ref 4.8–10.8)

## 2018-05-09 PROCEDURE — 25010000002 ENOXAPARIN PER 10 MG: Performed by: FAMILY MEDICINE

## 2018-05-09 PROCEDURE — 93306 TTE W/DOPPLER COMPLETE: CPT | Performed by: INTERNAL MEDICINE

## 2018-05-09 PROCEDURE — 93306 TTE W/DOPPLER COMPLETE: CPT

## 2018-05-09 PROCEDURE — 85025 COMPLETE CBC W/AUTO DIFF WBC: CPT | Performed by: INTERNAL MEDICINE

## 2018-05-09 PROCEDURE — 85007 BL SMEAR W/DIFF WBC COUNT: CPT | Performed by: INTERNAL MEDICINE

## 2018-05-09 PROCEDURE — G8979 MOBILITY GOAL STATUS: HCPCS

## 2018-05-09 PROCEDURE — 63710000001 INSULIN DETEMIR PER 5 UNITS: Performed by: INTERNAL MEDICINE

## 2018-05-09 PROCEDURE — 84165 PROTEIN E-PHORESIS SERUM: CPT | Performed by: INTERNAL MEDICINE

## 2018-05-09 PROCEDURE — 25010000002 ZOLEDRONIC ACID 4 MG/100ML SOLUTION: Performed by: INTERNAL MEDICINE

## 2018-05-09 PROCEDURE — 82962 GLUCOSE BLOOD TEST: CPT

## 2018-05-09 PROCEDURE — 82784 ASSAY IGA/IGD/IGG/IGM EACH: CPT | Performed by: INTERNAL MEDICINE

## 2018-05-09 PROCEDURE — 97162 PT EVAL MOD COMPLEX 30 MIN: CPT

## 2018-05-09 PROCEDURE — 82164 ANGIOTENSIN I ENZYME TEST: CPT | Performed by: INTERNAL MEDICINE

## 2018-05-09 PROCEDURE — 99222 1ST HOSP IP/OBS MODERATE 55: CPT | Performed by: INTERNAL MEDICINE

## 2018-05-09 PROCEDURE — 25010000002 THIAMINE PER 100 MG: Performed by: PSYCHIATRY & NEUROLOGY

## 2018-05-09 PROCEDURE — 99232 SBSQ HOSP IP/OBS MODERATE 35: CPT | Performed by: PSYCHIATRY & NEUROLOGY

## 2018-05-09 PROCEDURE — 80053 COMPREHEN METABOLIC PANEL: CPT | Performed by: INTERNAL MEDICINE

## 2018-05-09 PROCEDURE — 63710000001 INSULIN LISPRO (HUMAN) PER 5 UNITS: Performed by: INTERNAL MEDICINE

## 2018-05-09 PROCEDURE — 84300 ASSAY OF URINE SODIUM: CPT | Performed by: INTERNAL MEDICINE

## 2018-05-09 PROCEDURE — 25010000002 PERFLUTREN 6.52 MG/ML SUSPENSION: Performed by: INTERNAL MEDICINE

## 2018-05-09 PROCEDURE — G8978 MOBILITY CURRENT STATUS: HCPCS

## 2018-05-09 PROCEDURE — 86334 IMMUNOFIX E-PHORESIS SERUM: CPT | Performed by: INTERNAL MEDICINE

## 2018-05-09 RX ORDER — SODIUM CHLORIDE 9 MG/ML
50 INJECTION, SOLUTION INTRAVENOUS CONTINUOUS
Status: DISCONTINUED | OUTPATIENT
Start: 2018-05-09 | End: 2018-05-13

## 2018-05-09 RX ORDER — ZOLEDRONIC ACID 0.04 MG/ML
4 INJECTION, SOLUTION INTRAVENOUS ONCE
Status: COMPLETED | OUTPATIENT
Start: 2018-05-09 | End: 2018-05-09

## 2018-05-09 RX ORDER — AMLODIPINE BESYLATE 5 MG/1
5 TABLET ORAL
Status: DISCONTINUED | OUTPATIENT
Start: 2018-05-09 | End: 2018-05-23 | Stop reason: HOSPADM

## 2018-05-09 RX ADMIN — INSULIN LISPRO 2 UNITS: 100 INJECTION, SOLUTION INTRAVENOUS; SUBCUTANEOUS at 08:57

## 2018-05-09 RX ADMIN — ATORVASTATIN CALCIUM 10 MG: 10 TABLET, FILM COATED ORAL at 20:21

## 2018-05-09 RX ADMIN — FUROSEMIDE 20 MG: 20 TABLET ORAL at 08:56

## 2018-05-09 RX ADMIN — Medication 250 MG: at 08:56

## 2018-05-09 RX ADMIN — Medication 250 MG: at 20:21

## 2018-05-09 RX ADMIN — INSULIN DETEMIR 25 UNITS: 100 INJECTION, SOLUTION SUBCUTANEOUS at 09:03

## 2018-05-09 RX ADMIN — ZOLEDRONIC ACID 4 MG: 0.04 INJECTION, SOLUTION INTRAVENOUS at 15:55

## 2018-05-09 RX ADMIN — ESCITSLOPRAM 20 MG: 10 TABLET ORAL at 08:56

## 2018-05-09 RX ADMIN — INSULIN DETEMIR 25 UNITS: 100 INJECTION, SOLUTION SUBCUTANEOUS at 20:29

## 2018-05-09 RX ADMIN — MEMANTINE HYDROCHLORIDE 10 MG: 5 TABLET, FILM COATED ORAL at 08:56

## 2018-05-09 RX ADMIN — TRAMADOL HYDROCHLORIDE 50 MG: 50 TABLET, COATED ORAL at 20:21

## 2018-05-09 RX ADMIN — INSULIN LISPRO 4 UNITS: 100 INJECTION, SOLUTION INTRAVENOUS; SUBCUTANEOUS at 12:17

## 2018-05-09 RX ADMIN — AMLODIPINE BESYLATE 5 MG: 5 TABLET ORAL at 10:30

## 2018-05-09 RX ADMIN — ENOXAPARIN SODIUM 40 MG: 40 INJECTION SUBCUTANEOUS at 17:50

## 2018-05-09 RX ADMIN — CHOLECALCIFEROL (VITAMIN D3) 25 MCG (1,000 UNIT) TABLET 5000 UNITS: TABLET at 08:56

## 2018-05-09 RX ADMIN — SODIUM CHLORIDE 100 ML/HR: 9 INJECTION, SOLUTION INTRAVENOUS at 15:55

## 2018-05-09 RX ADMIN — PERFLUTREN 9.78 MG: 6.52 INJECTION, SUSPENSION INTRAVENOUS at 15:22

## 2018-05-09 RX ADMIN — THIAMINE HYDROCHLORIDE 100 MG: 100 INJECTION, SOLUTION INTRAMUSCULAR; INTRAVENOUS at 08:55

## 2018-05-10 ENCOUNTER — APPOINTMENT (OUTPATIENT)
Dept: NUCLEAR MEDICINE | Facility: HOSPITAL | Age: 73
End: 2018-05-10

## 2018-05-10 LAB
ACE SERPL-CCNC: 46 U/L (ref 14–82)
ALBUMIN SERPL-MCNC: 2.7 G/DL (ref 2.9–4.4)
ALBUMIN SERPL-MCNC: 3.2 G/DL (ref 3.5–5)
ALBUMIN/GLOB SERPL: 0.7 {RATIO} (ref 0.7–1.7)
ALBUMIN/GLOB SERPL: 0.9 G/DL (ref 1.1–2.5)
ALP SERPL-CCNC: 183 U/L (ref 24–120)
ALPHA1 GLOB FLD ELPH-MCNC: 0.4 G/DL (ref 0–0.4)
ALPHA2 GLOB SERPL ELPH-MCNC: 0.9 G/DL (ref 0.4–1)
ALT SERPL W P-5'-P-CCNC: 30 U/L (ref 0–54)
ANION GAP SERPL CALCULATED.3IONS-SCNC: 10 MMOL/L (ref 4–13)
AST SERPL-CCNC: 144 U/L (ref 7–45)
B-GLOBULIN SERPL ELPH-MCNC: 1.7 G/DL (ref 0.7–1.3)
BILIRUB SERPL-MCNC: 0.7 MG/DL (ref 0.1–1)
BUN BLD-MCNC: 15 MG/DL (ref 5–21)
BUN/CREAT SERPL: 16.7 (ref 7–25)
CALCIUM SPEC-SCNC: 11.8 MG/DL (ref 8.4–10.4)
CHLORIDE SERPL-SCNC: 107 MMOL/L (ref 98–110)
CO2 SERPL-SCNC: 26 MMOL/L (ref 24–31)
CREAT BLD-MCNC: 0.9 MG/DL (ref 0.5–1.4)
DEPRECATED RDW RBC AUTO: 53.2 FL (ref 40–54)
EOSINOPHIL # BLD MANUAL: 0.49 10*3/MM3 (ref 0–0.7)
EOSINOPHIL NFR BLD MANUAL: 2 % (ref 0–4)
ERYTHROCYTE [DISTWIDTH] IN BLOOD BY AUTOMATED COUNT: 16.4 % (ref 12–15)
GAMMA GLOB SERPL ELPH-MCNC: 1.1 G/DL (ref 0.4–1.8)
GFR SERPL CREATININE-BSD FRML MDRD: 61 ML/MIN/1.73
GLOBULIN SER CALC-MCNC: 4 G/DL (ref 2.2–3.9)
GLOBULIN UR ELPH-MCNC: 3.4 GM/DL
GLUCOSE BLD-MCNC: 109 MG/DL (ref 70–100)
GLUCOSE BLDC GLUCOMTR-MCNC: 108 MG/DL (ref 70–130)
GLUCOSE BLDC GLUCOMTR-MCNC: 116 MG/DL (ref 70–130)
GLUCOSE BLDC GLUCOMTR-MCNC: 139 MG/DL (ref 70–130)
GLUCOSE BLDC GLUCOMTR-MCNC: 146 MG/DL (ref 70–130)
GLUCOSE BLDC GLUCOMTR-MCNC: 97 MG/DL (ref 70–130)
HCT VFR BLD AUTO: 41.2 % (ref 37–47)
HGB BLD-MCNC: 13 G/DL (ref 12–16)
IGA SERPL-MCNC: 441 MG/DL (ref 64–422)
IGG SERPL-MCNC: 1057 MG/DL (ref 700–1600)
IGM SERPL-MCNC: 41 MG/DL (ref 26–217)
LYMPHOCYTES # BLD MANUAL: 14.13 10*3/MM3 (ref 0.72–4.86)
LYMPHOCYTES NFR BLD MANUAL: 2 % (ref 4–12)
LYMPHOCYTES NFR BLD MANUAL: 58 % (ref 15–45)
Lab: ABNORMAL
M-SPIKE: ABNORMAL G/DL
MCH RBC QN AUTO: 28.8 PG (ref 28–32)
MCHC RBC AUTO-ENTMCNC: 31.6 G/DL (ref 33–36)
MCV RBC AUTO: 91.4 FL (ref 82–98)
MONOCYTES # BLD AUTO: 0.49 10*3/MM3 (ref 0.19–1.3)
NEUTROPHILS # BLD AUTO: 8.04 10*3/MM3 (ref 1.87–8.4)
NEUTROPHILS NFR BLD MANUAL: 32 % (ref 39–78)
NEUTS BAND NFR BLD MANUAL: 1 % (ref 0–10)
NRBC BLD MANUAL-RTO: 0.1 /100 WBC (ref 0–0)
PLAT MORPH BLD: NORMAL
PLATELET # BLD AUTO: 395 10*3/MM3 (ref 130–400)
PMV BLD AUTO: 12.7 FL (ref 6–12)
POTASSIUM BLD-SCNC: 4 MMOL/L (ref 3.5–5.3)
PROT PATTERN SERPL ELPH-IMP: ABNORMAL
PROT PATTERN SERPL IFE-IMP: ABNORMAL
PROT SERPL-MCNC: 6.6 G/DL (ref 6.3–8.7)
PROT SERPL-MCNC: 6.7 G/DL (ref 6–8.5)
PTH RELATED PROT SERPL-SCNC: 6.2 PMOL/L
RBC # BLD AUTO: 4.51 10*6/MM3 (ref 4.2–5.4)
RBC MORPH BLD: NORMAL
SODIUM BLD-SCNC: 143 MMOL/L (ref 135–145)
VARIANT LYMPHS NFR BLD MANUAL: 5 % (ref 0–5)
WBC MORPH BLD: NORMAL
WBC NRBC COR # BLD: 24.37 10*3/MM3 (ref 4.8–10.8)

## 2018-05-10 PROCEDURE — 63710000001 INSULIN DETEMIR PER 5 UNITS: Performed by: INTERNAL MEDICINE

## 2018-05-10 PROCEDURE — 25010000002 THIAMINE PER 100 MG: Performed by: PSYCHIATRY & NEUROLOGY

## 2018-05-10 PROCEDURE — 82962 GLUCOSE BLOOD TEST: CPT

## 2018-05-10 PROCEDURE — 97530 THERAPEUTIC ACTIVITIES: CPT

## 2018-05-10 PROCEDURE — 25010000002 ENOXAPARIN PER 10 MG: Performed by: FAMILY MEDICINE

## 2018-05-10 PROCEDURE — 80053 COMPREHEN METABOLIC PANEL: CPT | Performed by: INTERNAL MEDICINE

## 2018-05-10 PROCEDURE — 85007 BL SMEAR W/DIFF WBC COUNT: CPT | Performed by: INTERNAL MEDICINE

## 2018-05-10 PROCEDURE — 78306 BONE IMAGING WHOLE BODY: CPT

## 2018-05-10 PROCEDURE — 82652 VIT D 1 25-DIHYDROXY: CPT | Performed by: INTERNAL MEDICINE

## 2018-05-10 PROCEDURE — 85025 COMPLETE CBC W/AUTO DIFF WBC: CPT | Performed by: INTERNAL MEDICINE

## 2018-05-10 PROCEDURE — 99233 SBSQ HOSP IP/OBS HIGH 50: CPT | Performed by: INTERNAL MEDICINE

## 2018-05-10 RX ORDER — MEGESTROL ACETATE 40 MG/ML
800 SUSPENSION ORAL DAILY
Status: DISCONTINUED | OUTPATIENT
Start: 2018-05-10 | End: 2018-05-23 | Stop reason: HOSPADM

## 2018-05-10 RX ORDER — KETOROLAC TROMETHAMINE 15 MG/ML
15 INJECTION, SOLUTION INTRAMUSCULAR; INTRAVENOUS ONCE
Status: COMPLETED | OUTPATIENT
Start: 2018-05-11 | End: 2018-05-10

## 2018-05-10 RX ADMIN — TRAMADOL HYDROCHLORIDE 50 MG: 50 TABLET, COATED ORAL at 19:57

## 2018-05-10 RX ADMIN — MEGESTROL ACETATE 800 MG: 40 SUSPENSION ORAL at 10:29

## 2018-05-10 RX ADMIN — SODIUM CHLORIDE 100 ML/HR: 9 INJECTION, SOLUTION INTRAVENOUS at 06:17

## 2018-05-10 RX ADMIN — MEMANTINE HYDROCHLORIDE 10 MG: 5 TABLET, FILM COATED ORAL at 08:27

## 2018-05-10 RX ADMIN — ESCITSLOPRAM 20 MG: 10 TABLET ORAL at 08:27

## 2018-05-10 RX ADMIN — AMLODIPINE BESYLATE 5 MG: 5 TABLET ORAL at 08:27

## 2018-05-10 RX ADMIN — INSULIN DETEMIR 25 UNITS: 100 INJECTION, SOLUTION SUBCUTANEOUS at 08:28

## 2018-05-10 RX ADMIN — Medication 250 MG: at 08:28

## 2018-05-10 RX ADMIN — ENOXAPARIN SODIUM 40 MG: 40 INJECTION SUBCUTANEOUS at 18:17

## 2018-05-10 RX ADMIN — FUROSEMIDE 20 MG: 20 TABLET ORAL at 08:28

## 2018-05-10 RX ADMIN — Medication 250 MG: at 20:05

## 2018-05-10 RX ADMIN — THIAMINE HYDROCHLORIDE 100 MG: 100 INJECTION, SOLUTION INTRAMUSCULAR; INTRAVENOUS at 08:28

## 2018-05-10 RX ADMIN — KETOROLAC TROMETHAMINE 15 MG: 15 INJECTION, SOLUTION INTRAMUSCULAR; INTRAVENOUS at 23:48

## 2018-05-10 RX ADMIN — HYDROXYZINE HYDROCHLORIDE 10 MG: 10 TABLET ORAL at 20:51

## 2018-05-10 RX ADMIN — ATORVASTATIN CALCIUM 10 MG: 10 TABLET, FILM COATED ORAL at 20:05

## 2018-05-11 LAB
ALBUMIN SERPL-MCNC: 3 G/DL (ref 3.5–5)
ALBUMIN/GLOB SERPL: 0.9 G/DL (ref 1.1–2.5)
ALP SERPL-CCNC: 171 U/L (ref 24–120)
ALT SERPL W P-5'-P-CCNC: 29 U/L (ref 0–54)
ANION GAP SERPL CALCULATED.3IONS-SCNC: 9 MMOL/L (ref 4–13)
ANISOCYTOSIS BLD QL: ABNORMAL
ARTERIAL PATENCY WRIST A: POSITIVE
AST SERPL-CCNC: 139 U/L (ref 7–45)
ATMOSPHERIC PRESS: 751 MMHG
BASE EXCESS BLDA CALC-SCNC: -2.1 MMOL/L (ref 0–2)
BDY SITE: ABNORMAL
BILIRUB SERPL-MCNC: 0.5 MG/DL (ref 0.1–1)
BODY TEMPERATURE: 37 C
BUN BLD-MCNC: 16 MG/DL (ref 5–21)
BUN/CREAT SERPL: 15.8 (ref 7–25)
CALCIUM SPEC-SCNC: 11 MG/DL (ref 8.4–10.4)
CHLORIDE SERPL-SCNC: 107 MMOL/L (ref 98–110)
CO2 SERPL-SCNC: 25 MMOL/L (ref 24–31)
CREAT BLD-MCNC: 1.01 MG/DL (ref 0.5–1.4)
DEPRECATED RDW RBC AUTO: 52.9 FL (ref 40–54)
EOSINOPHIL # BLD MANUAL: 0.48 10*3/MM3 (ref 0–0.7)
EOSINOPHIL NFR BLD MANUAL: 2 % (ref 0–4)
ERYTHROCYTE [DISTWIDTH] IN BLOOD BY AUTOMATED COUNT: 16.2 % (ref 12–15)
GFR SERPL CREATININE-BSD FRML MDRD: 54 ML/MIN/1.73
GLOBULIN UR ELPH-MCNC: 3.2 GM/DL
GLUCOSE BLD-MCNC: 137 MG/DL (ref 70–100)
GLUCOSE BLDC GLUCOMTR-MCNC: 153 MG/DL (ref 70–130)
GLUCOSE BLDC GLUCOMTR-MCNC: 159 MG/DL (ref 70–130)
GLUCOSE BLDC GLUCOMTR-MCNC: 174 MG/DL (ref 70–130)
GLUCOSE BLDC GLUCOMTR-MCNC: 189 MG/DL (ref 70–130)
HCO3 BLDA-SCNC: 23 MMOL/L (ref 20–26)
HCT VFR BLD AUTO: 38.5 % (ref 37–47)
HGB BLD-MCNC: 12.3 G/DL (ref 12–16)
LARGE PLATELETS: ABNORMAL
LYMPHOCYTES # BLD MANUAL: 12.55 10*3/MM3 (ref 0.72–4.86)
LYMPHOCYTES NFR BLD MANUAL: 2 % (ref 4–12)
LYMPHOCYTES NFR BLD MANUAL: 52 % (ref 15–45)
Lab: ABNORMAL
MCH RBC QN AUTO: 29 PG (ref 28–32)
MCHC RBC AUTO-ENTMCNC: 31.9 G/DL (ref 33–36)
MCV RBC AUTO: 90.8 FL (ref 82–98)
MODALITY: ABNORMAL
MONOCYTES # BLD AUTO: 0.48 10*3/MM3 (ref 0.19–1.3)
NEUTROPHILS # BLD AUTO: 9.9 10*3/MM3 (ref 1.87–8.4)
NEUTROPHILS NFR BLD MANUAL: 41 % (ref 39–78)
NRBC BLD MANUAL-RTO: 0.2 /100 WBC (ref 0–0)
PCO2 BLDA: 40 MM HG (ref 35–45)
PH BLDA: 7.37 PH UNITS (ref 7.35–7.45)
PLATELET # BLD AUTO: 402 10*3/MM3 (ref 130–400)
PMV BLD AUTO: 12.1 FL (ref 6–12)
PO2 BLDA: 63.9 MM HG (ref 83–108)
POTASSIUM BLD-SCNC: 4 MMOL/L (ref 3.5–5.3)
PROT SERPL-MCNC: 6.2 G/DL (ref 6.3–8.7)
RBC # BLD AUTO: 4.24 10*6/MM3 (ref 4.2–5.4)
SAO2 % BLDCOA: 92.4 % (ref 94–99)
SMALL PLATELETS BLD QL SMEAR: ABNORMAL
SODIUM BLD-SCNC: 141 MMOL/L (ref 135–145)
VARIANT LYMPHS NFR BLD MANUAL: 3 % (ref 0–5)
VENTILATOR MODE: ABNORMAL
WBC MORPH BLD: NORMAL
WBC NRBC COR # BLD: 24.14 10*3/MM3 (ref 4.8–10.8)

## 2018-05-11 PROCEDURE — 25010000002 ENOXAPARIN PER 10 MG: Performed by: FAMILY MEDICINE

## 2018-05-11 PROCEDURE — 97116 GAIT TRAINING THERAPY: CPT

## 2018-05-11 PROCEDURE — 63710000001 INSULIN LISPRO (HUMAN) PER 5 UNITS: Performed by: INTERNAL MEDICINE

## 2018-05-11 PROCEDURE — 25010000002 KETOROLAC TROMETHAMINE PER 15 MG: Performed by: FAMILY MEDICINE

## 2018-05-11 PROCEDURE — 82962 GLUCOSE BLOOD TEST: CPT

## 2018-05-11 PROCEDURE — 85025 COMPLETE CBC W/AUTO DIFF WBC: CPT | Performed by: INTERNAL MEDICINE

## 2018-05-11 PROCEDURE — 82803 BLOOD GASES ANY COMBINATION: CPT

## 2018-05-11 PROCEDURE — 85007 BL SMEAR W/DIFF WBC COUNT: CPT | Performed by: INTERNAL MEDICINE

## 2018-05-11 PROCEDURE — 97530 THERAPEUTIC ACTIVITIES: CPT

## 2018-05-11 PROCEDURE — 36600 WITHDRAWAL OF ARTERIAL BLOOD: CPT

## 2018-05-11 PROCEDURE — 80053 COMPREHEN METABOLIC PANEL: CPT | Performed by: INTERNAL MEDICINE

## 2018-05-11 PROCEDURE — 63710000001 INSULIN DETEMIR PER 5 UNITS: Performed by: INTERNAL MEDICINE

## 2018-05-11 PROCEDURE — 99233 SBSQ HOSP IP/OBS HIGH 50: CPT | Performed by: INTERNAL MEDICINE

## 2018-05-11 RX ORDER — FUROSEMIDE 40 MG/1
40 TABLET ORAL ONCE
Status: COMPLETED | OUTPATIENT
Start: 2018-05-11 | End: 2018-05-11

## 2018-05-11 RX ORDER — KETOROLAC TROMETHAMINE 15 MG/ML
15 INJECTION, SOLUTION INTRAMUSCULAR; INTRAVENOUS ONCE
Status: COMPLETED | OUTPATIENT
Start: 2018-05-11 | End: 2018-05-11

## 2018-05-11 RX ORDER — MODAFINIL 100 MG/1
200 TABLET ORAL DAILY
Status: DISPENSED | OUTPATIENT
Start: 2018-05-11 | End: 2018-05-21

## 2018-05-11 RX ADMIN — AMLODIPINE BESYLATE 5 MG: 5 TABLET ORAL at 09:55

## 2018-05-11 RX ADMIN — HYDROXYZINE HYDROCHLORIDE 10 MG: 10 TABLET ORAL at 21:41

## 2018-05-11 RX ADMIN — Medication 250 MG: at 09:55

## 2018-05-11 RX ADMIN — SODIUM CHLORIDE 100 ML/HR: 9 INJECTION, SOLUTION INTRAVENOUS at 21:28

## 2018-05-11 RX ADMIN — MEMANTINE HYDROCHLORIDE 10 MG: 5 TABLET, FILM COATED ORAL at 09:55

## 2018-05-11 RX ADMIN — TRAMADOL HYDROCHLORIDE 50 MG: 50 TABLET, COATED ORAL at 21:06

## 2018-05-11 RX ADMIN — Medication 250 MG: at 21:28

## 2018-05-11 RX ADMIN — INSULIN DETEMIR 25 UNITS: 100 INJECTION, SOLUTION SUBCUTANEOUS at 10:11

## 2018-05-11 RX ADMIN — INSULIN DETEMIR 25 UNITS: 100 INJECTION, SOLUTION SUBCUTANEOUS at 21:41

## 2018-05-11 RX ADMIN — INSULIN LISPRO 2 UNITS: 100 INJECTION, SOLUTION INTRAVENOUS; SUBCUTANEOUS at 17:36

## 2018-05-11 RX ADMIN — MODAFINIL 200 MG: 100 TABLET ORAL at 11:54

## 2018-05-11 RX ADMIN — ESCITSLOPRAM 20 MG: 10 TABLET ORAL at 09:55

## 2018-05-11 RX ADMIN — SODIUM CHLORIDE 100 ML/HR: 9 INJECTION, SOLUTION INTRAVENOUS at 10:08

## 2018-05-11 RX ADMIN — ENOXAPARIN SODIUM 40 MG: 40 INJECTION SUBCUTANEOUS at 18:17

## 2018-05-11 RX ADMIN — FUROSEMIDE 20 MG: 20 TABLET ORAL at 09:55

## 2018-05-11 RX ADMIN — ATORVASTATIN CALCIUM 10 MG: 10 TABLET, FILM COATED ORAL at 21:28

## 2018-05-11 RX ADMIN — KETOROLAC TROMETHAMINE 15 MG: 15 INJECTION, SOLUTION INTRAMUSCULAR; INTRAVENOUS at 23:07

## 2018-05-11 RX ADMIN — FUROSEMIDE 40 MG: 40 TABLET ORAL at 17:35

## 2018-05-11 RX ADMIN — INSULIN LISPRO 2 UNITS: 100 INJECTION, SOLUTION INTRAVENOUS; SUBCUTANEOUS at 21:41

## 2018-05-11 RX ADMIN — MEGESTROL ACETATE 800 MG: 40 SUSPENSION ORAL at 09:54

## 2018-05-12 LAB
ALBUMIN SERPL-MCNC: 2.9 G/DL (ref 3.5–5)
ALBUMIN/GLOB SERPL: 0.9 G/DL (ref 1.1–2.5)
ALP SERPL-CCNC: 186 U/L (ref 24–120)
ALT SERPL W P-5'-P-CCNC: 30 U/L (ref 0–54)
ANION GAP SERPL CALCULATED.3IONS-SCNC: 8 MMOL/L (ref 4–13)
AST SERPL-CCNC: 141 U/L (ref 7–45)
BACTERIA SPEC AEROBE CULT: NORMAL
BILIRUB SERPL-MCNC: 0.4 MG/DL (ref 0.1–1)
BUN BLD-MCNC: 19 MG/DL (ref 5–21)
BUN/CREAT SERPL: 17.4 (ref 7–25)
CALCIUM SPEC-SCNC: 10.2 MG/DL (ref 8.4–10.4)
CHLORIDE SERPL-SCNC: 109 MMOL/L (ref 98–110)
CO2 SERPL-SCNC: 24 MMOL/L (ref 24–31)
CREAT BLD-MCNC: 1.09 MG/DL (ref 0.5–1.4)
DEPRECATED RDW RBC AUTO: 51.8 FL (ref 40–54)
EOSINOPHIL # BLD MANUAL: 0.83 10*3/MM3 (ref 0–0.7)
EOSINOPHIL NFR BLD MANUAL: 3 % (ref 0–4)
ERYTHROCYTE [DISTWIDTH] IN BLOOD BY AUTOMATED COUNT: 15.9 % (ref 12–15)
GFR SERPL CREATININE-BSD FRML MDRD: 49 ML/MIN/1.73
GIANT PLATELETS: ABNORMAL
GLOBULIN UR ELPH-MCNC: 3.4 GM/DL
GLUCOSE BLD-MCNC: 98 MG/DL (ref 70–100)
GLUCOSE BLDC GLUCOMTR-MCNC: 122 MG/DL (ref 70–130)
GLUCOSE BLDC GLUCOMTR-MCNC: 133 MG/DL (ref 70–130)
GLUCOSE BLDC GLUCOMTR-MCNC: 136 MG/DL (ref 70–130)
GLUCOSE BLDC GLUCOMTR-MCNC: 88 MG/DL (ref 70–130)
HCT VFR BLD AUTO: 39.1 % (ref 37–47)
HGB BLD-MCNC: 12.3 G/DL (ref 12–16)
HYPOCHROMIA BLD QL: ABNORMAL
LYMPHOCYTES # BLD MANUAL: 13.59 10*3/MM3 (ref 0.72–4.86)
LYMPHOCYTES NFR BLD MANUAL: 4 % (ref 4–12)
LYMPHOCYTES NFR BLD MANUAL: 49 % (ref 15–45)
MCH RBC QN AUTO: 28.3 PG (ref 28–32)
MCHC RBC AUTO-ENTMCNC: 31.5 G/DL (ref 33–36)
MCV RBC AUTO: 89.9 FL (ref 82–98)
MONOCYTES # BLD AUTO: 1.11 10*3/MM3 (ref 0.19–1.3)
NEUTROPHILS # BLD AUTO: 11.65 10*3/MM3 (ref 1.87–8.4)
NEUTROPHILS NFR BLD MANUAL: 37 % (ref 39–78)
NEUTS BAND NFR BLD MANUAL: 5 % (ref 0–10)
PLATELET # BLD AUTO: 369 10*3/MM3 (ref 130–400)
PMV BLD AUTO: 12.2 FL (ref 6–12)
POTASSIUM BLD-SCNC: 4 MMOL/L (ref 3.5–5.3)
PROT SERPL-MCNC: 6.3 G/DL (ref 6.3–8.7)
RBC # BLD AUTO: 4.35 10*6/MM3 (ref 4.2–5.4)
SODIUM BLD-SCNC: 141 MMOL/L (ref 135–145)
TARGETS BLD QL SMEAR: ABNORMAL
VARIANT LYMPHS NFR BLD MANUAL: 2 % (ref 0–5)
WBC MORPH BLD: NORMAL
WBC NRBC COR # BLD: 27.73 10*3/MM3 (ref 4.8–10.8)

## 2018-05-12 PROCEDURE — 82962 GLUCOSE BLOOD TEST: CPT

## 2018-05-12 PROCEDURE — 97116 GAIT TRAINING THERAPY: CPT

## 2018-05-12 PROCEDURE — 85007 BL SMEAR W/DIFF WBC COUNT: CPT | Performed by: INTERNAL MEDICINE

## 2018-05-12 PROCEDURE — 63710000001 INSULIN DETEMIR PER 5 UNITS: Performed by: INTERNAL MEDICINE

## 2018-05-12 PROCEDURE — 85025 COMPLETE CBC W/AUTO DIFF WBC: CPT | Performed by: INTERNAL MEDICINE

## 2018-05-12 PROCEDURE — 25010000002 ENOXAPARIN PER 10 MG: Performed by: FAMILY MEDICINE

## 2018-05-12 PROCEDURE — 80053 COMPREHEN METABOLIC PANEL: CPT | Performed by: INTERNAL MEDICINE

## 2018-05-12 PROCEDURE — 97110 THERAPEUTIC EXERCISES: CPT

## 2018-05-12 RX ADMIN — FUROSEMIDE 20 MG: 20 TABLET ORAL at 10:10

## 2018-05-12 RX ADMIN — ENOXAPARIN SODIUM 40 MG: 40 INJECTION SUBCUTANEOUS at 21:37

## 2018-05-12 RX ADMIN — SODIUM CHLORIDE 50 ML/HR: 9 INJECTION, SOLUTION INTRAVENOUS at 23:09

## 2018-05-12 RX ADMIN — INSULIN DETEMIR 25 UNITS: 100 INJECTION, SOLUTION SUBCUTANEOUS at 21:31

## 2018-05-12 RX ADMIN — Medication 250 MG: at 10:10

## 2018-05-12 RX ADMIN — ATORVASTATIN CALCIUM 10 MG: 10 TABLET, FILM COATED ORAL at 21:31

## 2018-05-12 RX ADMIN — MEGESTROL ACETATE 800 MG: 40 SUSPENSION ORAL at 10:10

## 2018-05-12 RX ADMIN — TRAMADOL HYDROCHLORIDE 50 MG: 50 TABLET, COATED ORAL at 01:11

## 2018-05-12 RX ADMIN — Medication 250 MG: at 21:31

## 2018-05-12 RX ADMIN — AMLODIPINE BESYLATE 5 MG: 5 TABLET ORAL at 10:11

## 2018-05-12 RX ADMIN — ESCITSLOPRAM 20 MG: 10 TABLET ORAL at 10:11

## 2018-05-12 RX ADMIN — MODAFINIL 200 MG: 100 TABLET ORAL at 09:24

## 2018-05-12 RX ADMIN — MEMANTINE HYDROCHLORIDE 10 MG: 5 TABLET, FILM COATED ORAL at 10:11

## 2018-05-12 RX ADMIN — INSULIN DETEMIR 25 UNITS: 100 INJECTION, SOLUTION SUBCUTANEOUS at 10:14

## 2018-05-12 RX ADMIN — HYDROXYZINE HYDROCHLORIDE 10 MG: 10 TABLET ORAL at 23:09

## 2018-05-13 LAB
ANION GAP SERPL CALCULATED.3IONS-SCNC: 9 MMOL/L (ref 4–13)
BUN BLD-MCNC: 22 MG/DL (ref 5–21)
BUN/CREAT SERPL: 19.8 (ref 7–25)
CALCIUM SPEC-SCNC: 10.2 MG/DL (ref 8.4–10.4)
CHLORIDE SERPL-SCNC: 110 MMOL/L (ref 98–110)
CO2 SERPL-SCNC: 23 MMOL/L (ref 24–31)
CREAT BLD-MCNC: 1.11 MG/DL (ref 0.5–1.4)
DEPRECATED RDW RBC AUTO: 52.1 FL (ref 40–54)
ERYTHROCYTE [DISTWIDTH] IN BLOOD BY AUTOMATED COUNT: 15.9 % (ref 12–15)
GFR SERPL CREATININE-BSD FRML MDRD: 48 ML/MIN/1.73
GIANT PLATELETS: ABNORMAL
GLUCOSE BLD-MCNC: 60 MG/DL (ref 70–100)
GLUCOSE BLDC GLUCOMTR-MCNC: 102 MG/DL (ref 70–130)
GLUCOSE BLDC GLUCOMTR-MCNC: 136 MG/DL (ref 70–130)
GLUCOSE BLDC GLUCOMTR-MCNC: 136 MG/DL (ref 70–130)
GLUCOSE BLDC GLUCOMTR-MCNC: 150 MG/DL (ref 70–130)
GLUCOSE BLDC GLUCOMTR-MCNC: 69 MG/DL (ref 70–130)
HCT VFR BLD AUTO: 39.6 % (ref 37–47)
HGB BLD-MCNC: 12.5 G/DL (ref 12–16)
LYMPHOCYTES # BLD MANUAL: 12.35 10*3/MM3 (ref 0.72–4.86)
LYMPHOCYTES NFR BLD MANUAL: 13.1 % (ref 4–12)
LYMPHOCYTES NFR BLD MANUAL: 41.4 % (ref 15–45)
MCH RBC QN AUTO: 28.2 PG (ref 28–32)
MCHC RBC AUTO-ENTMCNC: 31.6 G/DL (ref 33–36)
MCV RBC AUTO: 89.4 FL (ref 82–98)
MONOCYTES # BLD AUTO: 3.91 10*3/MM3 (ref 0.19–1.3)
NEUTROPHILS # BLD AUTO: 9.94 10*3/MM3 (ref 1.87–8.4)
NEUTROPHILS NFR BLD MANUAL: 33.3 % (ref 39–78)
NRBC SPEC MANUAL: 1 /100 WBC (ref 0–0)
PLATELET # BLD AUTO: 379 10*3/MM3 (ref 130–400)
PMV BLD AUTO: 12.2 FL (ref 6–12)
POTASSIUM BLD-SCNC: 4.2 MMOL/L (ref 3.5–5.3)
RBC # BLD AUTO: 4.43 10*6/MM3 (ref 4.2–5.4)
RBC MORPH BLD: NORMAL
SODIUM BLD-SCNC: 142 MMOL/L (ref 135–145)
VARIANT LYMPHS NFR BLD MANUAL: 12.1 % (ref 0–5)
WBC MORPH BLD: NORMAL
WBC NRBC COR # BLD: 29.84 10*3/MM3 (ref 4.8–10.8)

## 2018-05-13 PROCEDURE — 97116 GAIT TRAINING THERAPY: CPT

## 2018-05-13 PROCEDURE — 80048 BASIC METABOLIC PNL TOTAL CA: CPT | Performed by: INTERNAL MEDICINE

## 2018-05-13 PROCEDURE — 82962 GLUCOSE BLOOD TEST: CPT

## 2018-05-13 PROCEDURE — 85007 BL SMEAR W/DIFF WBC COUNT: CPT | Performed by: INTERNAL MEDICINE

## 2018-05-13 PROCEDURE — 63710000001 INSULIN DETEMIR PER 5 UNITS: Performed by: INTERNAL MEDICINE

## 2018-05-13 PROCEDURE — 85025 COMPLETE CBC W/AUTO DIFF WBC: CPT | Performed by: INTERNAL MEDICINE

## 2018-05-13 PROCEDURE — 25010000002 ENOXAPARIN PER 10 MG: Performed by: FAMILY MEDICINE

## 2018-05-13 PROCEDURE — 97110 THERAPEUTIC EXERCISES: CPT

## 2018-05-13 RX ORDER — DOCUSATE SODIUM 100 MG/1
100 CAPSULE, LIQUID FILLED ORAL 2 TIMES DAILY
Status: DISCONTINUED | OUTPATIENT
Start: 2018-05-13 | End: 2018-05-15

## 2018-05-13 RX ADMIN — AMLODIPINE BESYLATE 5 MG: 5 TABLET ORAL at 08:27

## 2018-05-13 RX ADMIN — MEMANTINE HYDROCHLORIDE 10 MG: 5 TABLET, FILM COATED ORAL at 08:27

## 2018-05-13 RX ADMIN — FUROSEMIDE 20 MG: 20 TABLET ORAL at 08:31

## 2018-05-13 RX ADMIN — MEGESTROL ACETATE 800 MG: 40 SUSPENSION ORAL at 08:26

## 2018-05-13 RX ADMIN — Medication 250 MG: at 08:27

## 2018-05-13 RX ADMIN — TRAMADOL HYDROCHLORIDE 50 MG: 50 TABLET, COATED ORAL at 00:18

## 2018-05-13 RX ADMIN — DOCUSATE SODIUM 100 MG: 100 CAPSULE, LIQUID FILLED ORAL at 21:26

## 2018-05-13 RX ADMIN — DOCUSATE SODIUM 100 MG: 100 CAPSULE, LIQUID FILLED ORAL at 12:04

## 2018-05-13 RX ADMIN — ENOXAPARIN SODIUM 40 MG: 40 INJECTION SUBCUTANEOUS at 19:05

## 2018-05-13 RX ADMIN — Medication 250 MG: at 21:27

## 2018-05-13 RX ADMIN — INSULIN DETEMIR 20 UNITS: 100 INJECTION, SOLUTION SUBCUTANEOUS at 21:27

## 2018-05-13 RX ADMIN — ATORVASTATIN CALCIUM 10 MG: 10 TABLET, FILM COATED ORAL at 21:27

## 2018-05-13 RX ADMIN — MODAFINIL 200 MG: 100 TABLET ORAL at 08:31

## 2018-05-13 RX ADMIN — ESCITSLOPRAM 20 MG: 10 TABLET ORAL at 08:27

## 2018-05-14 LAB
1,25(OH)2D3 SERPL-MCNC: 79.4 PG/ML (ref 19.9–79.3)
ANION GAP SERPL CALCULATED.3IONS-SCNC: 9 MMOL/L (ref 4–13)
ANISOCYTOSIS BLD QL: ABNORMAL
BASOPHILS # BLD MANUAL: 0.26 10*3/MM3 (ref 0–0.2)
BASOPHILS NFR BLD AUTO: 1 % (ref 0–2)
BUN BLD-MCNC: 19 MG/DL (ref 5–21)
BUN/CREAT SERPL: 17.1 (ref 7–25)
CALCIUM SPEC-SCNC: 9.6 MG/DL (ref 8.4–10.4)
CEA SERPL-MCNC: 3.07 NG/ML (ref 0–5)
CHLORIDE SERPL-SCNC: 109 MMOL/L (ref 98–110)
CO2 SERPL-SCNC: 22 MMOL/L (ref 24–31)
CREAT BLD-MCNC: 1.11 MG/DL (ref 0.5–1.4)
DEPRECATED RDW RBC AUTO: 52.1 FL (ref 40–54)
ERYTHROCYTE [DISTWIDTH] IN BLOOD BY AUTOMATED COUNT: 16 % (ref 12–15)
GFR SERPL CREATININE-BSD FRML MDRD: 48 ML/MIN/1.73
GLUCOSE BLD-MCNC: 89 MG/DL (ref 70–100)
GLUCOSE BLDC GLUCOMTR-MCNC: 111 MG/DL (ref 70–130)
GLUCOSE BLDC GLUCOMTR-MCNC: 131 MG/DL (ref 70–130)
GLUCOSE BLDC GLUCOMTR-MCNC: 215 MG/DL (ref 70–130)
GLUCOSE BLDC GLUCOMTR-MCNC: 217 MG/DL (ref 70–130)
GLUCOSE BLDC GLUCOMTR-MCNC: 93 MG/DL (ref 70–130)
HCT VFR BLD AUTO: 39.2 % (ref 37–47)
HGB BLD-MCNC: 12.6 G/DL (ref 12–16)
HYPOCHROMIA BLD QL: ABNORMAL
LYMPHOCYTES # BLD MANUAL: 8.45 10*3/MM3 (ref 0.72–4.86)
LYMPHOCYTES NFR BLD MANUAL: 11.7 % (ref 4–12)
LYMPHOCYTES NFR BLD MANUAL: 33 % (ref 15–45)
MCH RBC QN AUTO: 28.7 PG (ref 28–32)
MCHC RBC AUTO-ENTMCNC: 32.1 G/DL (ref 33–36)
MCV RBC AUTO: 89.3 FL (ref 82–98)
MONOCYTES # BLD AUTO: 3 10*3/MM3 (ref 0.19–1.3)
NEUTROPHILS # BLD AUTO: 11.44 10*3/MM3 (ref 1.87–8.4)
NEUTROPHILS NFR BLD MANUAL: 40.8 % (ref 39–78)
NEUTS BAND NFR BLD MANUAL: 3.9 % (ref 0–10)
PLATELET # BLD AUTO: 450 10*3/MM3 (ref 130–400)
PMV BLD AUTO: 12.3 FL (ref 6–12)
POIKILOCYTOSIS BLD QL SMEAR: ABNORMAL
POTASSIUM BLD-SCNC: 3.9 MMOL/L (ref 3.5–5.3)
RBC # BLD AUTO: 4.39 10*6/MM3 (ref 4.2–5.4)
SMALL PLATELETS BLD QL SMEAR: ABNORMAL
SMUDGE CELLS BLD QL SMEAR: ABNORMAL
SODIUM BLD-SCNC: 140 MMOL/L (ref 135–145)
TARGETS BLD QL SMEAR: ABNORMAL
VARIANT LYMPHS NFR BLD MANUAL: 9.7 % (ref 0–5)
WBC NRBC COR # BLD: 25.62 10*3/MM3 (ref 4.8–10.8)

## 2018-05-14 PROCEDURE — 63710000001 INSULIN DETEMIR PER 5 UNITS: Performed by: INTERNAL MEDICINE

## 2018-05-14 PROCEDURE — 25010000002 ENOXAPARIN PER 10 MG: Performed by: FAMILY MEDICINE

## 2018-05-14 PROCEDURE — G8988 SELF CARE GOAL STATUS: HCPCS | Performed by: OCCUPATIONAL THERAPIST

## 2018-05-14 PROCEDURE — 97166 OT EVAL MOD COMPLEX 45 MIN: CPT | Performed by: OCCUPATIONAL THERAPIST

## 2018-05-14 PROCEDURE — 85007 BL SMEAR W/DIFF WBC COUNT: CPT | Performed by: INTERNAL MEDICINE

## 2018-05-14 PROCEDURE — 82962 GLUCOSE BLOOD TEST: CPT

## 2018-05-14 PROCEDURE — 80048 BASIC METABOLIC PNL TOTAL CA: CPT | Performed by: INTERNAL MEDICINE

## 2018-05-14 PROCEDURE — 86300 IMMUNOASSAY TUMOR CA 15-3: CPT | Performed by: PHYSICIAN ASSISTANT

## 2018-05-14 PROCEDURE — 85025 COMPLETE CBC W/AUTO DIFF WBC: CPT | Performed by: INTERNAL MEDICINE

## 2018-05-14 PROCEDURE — 82378 CARCINOEMBRYONIC ANTIGEN: CPT | Performed by: PHYSICIAN ASSISTANT

## 2018-05-14 PROCEDURE — 99233 SBSQ HOSP IP/OBS HIGH 50: CPT | Performed by: INTERNAL MEDICINE

## 2018-05-14 PROCEDURE — 97530 THERAPEUTIC ACTIVITIES: CPT

## 2018-05-14 PROCEDURE — 86301 IMMUNOASSAY TUMOR CA 19-9: CPT | Performed by: PHYSICIAN ASSISTANT

## 2018-05-14 PROCEDURE — 63710000001 INSULIN LISPRO (HUMAN) PER 5 UNITS: Performed by: INTERNAL MEDICINE

## 2018-05-14 PROCEDURE — G8987 SELF CARE CURRENT STATUS: HCPCS | Performed by: OCCUPATIONAL THERAPIST

## 2018-05-14 PROCEDURE — 97116 GAIT TRAINING THERAPY: CPT

## 2018-05-14 PROCEDURE — 86304 IMMUNOASSAY TUMOR CA 125: CPT | Performed by: PHYSICIAN ASSISTANT

## 2018-05-14 RX ADMIN — Medication 250 MG: at 08:24

## 2018-05-14 RX ADMIN — MEGESTROL ACETATE 800 MG: 40 SUSPENSION ORAL at 08:23

## 2018-05-14 RX ADMIN — FUROSEMIDE 20 MG: 20 TABLET ORAL at 08:24

## 2018-05-14 RX ADMIN — MEMANTINE HYDROCHLORIDE 10 MG: 5 TABLET, FILM COATED ORAL at 08:24

## 2018-05-14 RX ADMIN — DOCUSATE SODIUM 100 MG: 100 CAPSULE, LIQUID FILLED ORAL at 08:24

## 2018-05-14 RX ADMIN — ESCITSLOPRAM 20 MG: 10 TABLET ORAL at 08:24

## 2018-05-14 RX ADMIN — AMLODIPINE BESYLATE 5 MG: 5 TABLET ORAL at 08:24

## 2018-05-14 RX ADMIN — ENOXAPARIN SODIUM 40 MG: 40 INJECTION SUBCUTANEOUS at 17:46

## 2018-05-14 RX ADMIN — ATORVASTATIN CALCIUM 10 MG: 10 TABLET, FILM COATED ORAL at 21:29

## 2018-05-14 RX ADMIN — INSULIN DETEMIR 20 UNITS: 100 INJECTION, SOLUTION SUBCUTANEOUS at 21:28

## 2018-05-14 RX ADMIN — INSULIN DETEMIR 20 UNITS: 100 INJECTION, SOLUTION SUBCUTANEOUS at 08:23

## 2018-05-14 RX ADMIN — Medication 250 MG: at 21:29

## 2018-05-14 RX ADMIN — TRAMADOL HYDROCHLORIDE 50 MG: 50 TABLET, COATED ORAL at 17:46

## 2018-05-14 RX ADMIN — MODAFINIL 200 MG: 100 TABLET ORAL at 08:26

## 2018-05-14 RX ADMIN — INSULIN LISPRO 4 UNITS: 100 INJECTION, SOLUTION INTRAVENOUS; SUBCUTANEOUS at 12:08

## 2018-05-15 LAB
ACHR AB SER-SCNC: <0.03 NMOL/L (ref 0–0.24)
ACHR BLOCK AB/ACHR TOTAL SFR SER: 17 % (ref 0–25)
ACHR MOD AB/ACHR TOTAL SFR SER: <12 % (ref 0–20)
AMMONIA BLD-SCNC: 25 UMOL/L (ref 9–33)
ANION GAP SERPL CALCULATED.3IONS-SCNC: 10 MMOL/L (ref 4–13)
APTT PPP: 44.8 SECONDS (ref 24.1–34.8)
BUN BLD-MCNC: 17 MG/DL (ref 5–21)
BUN/CREAT SERPL: 16.3 (ref 7–25)
C DIFF TOX GENS STL QL NAA+PROBE: POSITIVE
CALCIUM SPEC-SCNC: 10.2 MG/DL (ref 8.4–10.4)
CHLORIDE SERPL-SCNC: 113 MMOL/L (ref 98–110)
CO2 SERPL-SCNC: 18 MMOL/L (ref 24–31)
CREAT BLD-MCNC: 1.04 MG/DL (ref 0.5–1.4)
GFR SERPL CREATININE-BSD FRML MDRD: 52 ML/MIN/1.73
GLUCOSE BLD-MCNC: 104 MG/DL (ref 70–100)
GLUCOSE BLDC GLUCOMTR-MCNC: 115 MG/DL (ref 70–130)
GLUCOSE BLDC GLUCOMTR-MCNC: 140 MG/DL (ref 70–130)
GLUCOSE BLDC GLUCOMTR-MCNC: 90 MG/DL (ref 70–130)
GLUCOSE BLDC GLUCOMTR-MCNC: 98 MG/DL (ref 70–130)
INR PPP: 1.08 (ref 0.91–1.09)
POTASSIUM BLD-SCNC: 5 MMOL/L (ref 3.5–5.3)
PROTHROMBIN TIME: 14.3 SECONDS (ref 11.9–14.6)
SODIUM BLD-SCNC: 141 MMOL/L (ref 135–145)

## 2018-05-15 PROCEDURE — 82105 ALPHA-FETOPROTEIN SERUM: CPT | Performed by: INTERNAL MEDICINE

## 2018-05-15 PROCEDURE — 82140 ASSAY OF AMMONIA: CPT | Performed by: INTERNAL MEDICINE

## 2018-05-15 PROCEDURE — 25010000002 ONDANSETRON PER 1 MG: Performed by: INTERNAL MEDICINE

## 2018-05-15 PROCEDURE — 63710000001 INSULIN DETEMIR PER 5 UNITS: Performed by: INTERNAL MEDICINE

## 2018-05-15 PROCEDURE — 82962 GLUCOSE BLOOD TEST: CPT

## 2018-05-15 PROCEDURE — 97530 THERAPEUTIC ACTIVITIES: CPT

## 2018-05-15 PROCEDURE — 80048 BASIC METABOLIC PNL TOTAL CA: CPT | Performed by: INTERNAL MEDICINE

## 2018-05-15 PROCEDURE — 87493 C DIFF AMPLIFIED PROBE: CPT | Performed by: FAMILY MEDICINE

## 2018-05-15 PROCEDURE — 97116 GAIT TRAINING THERAPY: CPT

## 2018-05-15 PROCEDURE — 85730 THROMBOPLASTIN TIME PARTIAL: CPT | Performed by: PHYSICIAN ASSISTANT

## 2018-05-15 PROCEDURE — 85610 PROTHROMBIN TIME: CPT | Performed by: PHYSICIAN ASSISTANT

## 2018-05-15 RX ORDER — SODIUM CHLORIDE, SODIUM LACTATE, POTASSIUM CHLORIDE, CALCIUM CHLORIDE 600; 310; 30; 20 MG/100ML; MG/100ML; MG/100ML; MG/100ML
20 INJECTION, SOLUTION INTRAVENOUS CONTINUOUS
Status: DISCONTINUED | OUTPATIENT
Start: 2018-05-15 | End: 2018-05-21

## 2018-05-15 RX ORDER — TRAMADOL HYDROCHLORIDE 50 MG/1
50 TABLET ORAL EVERY 6 HOURS PRN
Status: DISCONTINUED | OUTPATIENT
Start: 2018-05-15 | End: 2018-05-23 | Stop reason: HOSPADM

## 2018-05-15 RX ORDER — DIAZEPAM 2 MG/1
2 TABLET ORAL ONCE
Status: DISCONTINUED | OUTPATIENT
Start: 2018-05-15 | End: 2018-05-23 | Stop reason: HOSPADM

## 2018-05-15 RX ORDER — FAMOTIDINE 20 MG/1
20 TABLET, FILM COATED ORAL 2 TIMES DAILY
Status: DISCONTINUED | OUTPATIENT
Start: 2018-05-15 | End: 2018-05-17

## 2018-05-15 RX ADMIN — VANCOMYCIN 250 MG: KIT at 22:04

## 2018-05-15 RX ADMIN — SODIUM CHLORIDE, POTASSIUM CHLORIDE, SODIUM LACTATE AND CALCIUM CHLORIDE 50 ML/HR: 600; 310; 30; 20 INJECTION, SOLUTION INTRAVENOUS at 19:11

## 2018-05-15 RX ADMIN — AMLODIPINE BESYLATE 5 MG: 5 TABLET ORAL at 09:43

## 2018-05-15 RX ADMIN — INSULIN DETEMIR 20 UNITS: 100 INJECTION, SOLUTION SUBCUTANEOUS at 09:43

## 2018-05-15 RX ADMIN — ATORVASTATIN CALCIUM 10 MG: 10 TABLET, FILM COATED ORAL at 20:22

## 2018-05-15 RX ADMIN — MEGESTROL ACETATE 800 MG: 40 SUSPENSION ORAL at 09:43

## 2018-05-15 RX ADMIN — TRAMADOL HYDROCHLORIDE 50 MG: 50 TABLET, COATED ORAL at 22:04

## 2018-05-15 RX ADMIN — Medication 250 MG: at 20:22

## 2018-05-15 RX ADMIN — Medication 250 MG: at 09:43

## 2018-05-15 RX ADMIN — ONDANSETRON 4 MG: 2 INJECTION, SOLUTION INTRAMUSCULAR; INTRAVENOUS at 21:00

## 2018-05-15 RX ADMIN — FUROSEMIDE 20 MG: 20 TABLET ORAL at 09:43

## 2018-05-15 RX ADMIN — MEMANTINE HYDROCHLORIDE 10 MG: 5 TABLET, FILM COATED ORAL at 09:43

## 2018-05-15 RX ADMIN — MODAFINIL 200 MG: 100 TABLET ORAL at 09:43

## 2018-05-15 RX ADMIN — TRAMADOL HYDROCHLORIDE 50 MG: 50 TABLET, COATED ORAL at 02:11

## 2018-05-15 RX ADMIN — FAMOTIDINE 20 MG: 20 TABLET, FILM COATED ORAL at 22:04

## 2018-05-15 RX ADMIN — DOCUSATE SODIUM 100 MG: 100 CAPSULE, LIQUID FILLED ORAL at 09:43

## 2018-05-15 RX ADMIN — ESCITSLOPRAM 20 MG: 10 TABLET ORAL at 09:43

## 2018-05-16 LAB
AFP-TM SERPL-MCNC: 14.3 NG/ML (ref 0–8.3)
ANION GAP SERPL CALCULATED.3IONS-SCNC: 11 MMOL/L (ref 4–13)
APTT PPP: 46.2 SECONDS (ref 24.1–34.8)
BASO STIPL COARSE BLD QL SMEAR: ABNORMAL
BUN BLD-MCNC: 21 MG/DL (ref 5–21)
BUN/CREAT SERPL: 16.2 (ref 7–25)
CALCIUM SPEC-SCNC: 9.6 MG/DL (ref 8.4–10.4)
CANCER AG125 SERPL-ACNC: 176.8 U/ML (ref 0–38.1)
CANCER AG15-3 SERPL-ACNC: 63 U/ML (ref 0–25)
CANCER AG19-9 SERPL-ACNC: 605 U/ML (ref 0–35)
CHLORIDE SERPL-SCNC: 109 MMOL/L (ref 98–110)
CO2 SERPL-SCNC: 22 MMOL/L (ref 24–31)
CREAT BLD-MCNC: 1.3 MG/DL (ref 0.5–1.4)
DEPRECATED RDW RBC AUTO: 51.8 FL (ref 40–54)
EOSINOPHIL # BLD MANUAL: 0.55 10*3/MM3 (ref 0–0.7)
EOSINOPHIL NFR BLD MANUAL: 2 % (ref 0–4)
ERYTHROCYTE [DISTWIDTH] IN BLOOD BY AUTOMATED COUNT: 15.9 % (ref 12–15)
GFR SERPL CREATININE-BSD FRML MDRD: 40 ML/MIN/1.73
GIANT PLATELETS: ABNORMAL
GLUCOSE BLD-MCNC: 114 MG/DL (ref 70–100)
GLUCOSE BLDC GLUCOMTR-MCNC: 114 MG/DL (ref 70–130)
GLUCOSE BLDC GLUCOMTR-MCNC: 128 MG/DL (ref 70–130)
GLUCOSE BLDC GLUCOMTR-MCNC: 225 MG/DL (ref 70–130)
GLUCOSE BLDC GLUCOMTR-MCNC: 97 MG/DL (ref 70–130)
HCT VFR BLD AUTO: 40.5 % (ref 37–47)
HGB BLD-MCNC: 13 G/DL (ref 12–16)
LYMPHOCYTES # BLD MANUAL: 10.99 10*3/MM3 (ref 0.72–4.86)
LYMPHOCYTES NFR BLD MANUAL: 40 % (ref 15–45)
LYMPHOCYTES NFR BLD MANUAL: 9 % (ref 4–12)
MCH RBC QN AUTO: 28.8 PG (ref 28–32)
MCHC RBC AUTO-ENTMCNC: 32.1 G/DL (ref 33–36)
MCV RBC AUTO: 89.6 FL (ref 82–98)
MONOCYTES # BLD AUTO: 2.47 10*3/MM3 (ref 0.19–1.3)
NEUTROPHILS # BLD AUTO: 9.62 10*3/MM3 (ref 1.87–8.4)
NEUTROPHILS NFR BLD MANUAL: 29 % (ref 39–78)
NEUTS BAND NFR BLD MANUAL: 6 % (ref 0–10)
PLATELET # BLD AUTO: 485 10*3/MM3 (ref 130–400)
PMV BLD AUTO: 11.8 FL (ref 6–12)
POLYCHROMASIA BLD QL SMEAR: ABNORMAL
POTASSIUM BLD-SCNC: 4.1 MMOL/L (ref 3.5–5.3)
PTT MIX W PLASMA: 37.1 SECONDS (ref 24.1–34.8)
RBC # BLD AUTO: 4.52 10*6/MM3 (ref 4.2–5.4)
SODIUM BLD-SCNC: 142 MMOL/L (ref 135–145)
VARIANT LYMPHS NFR BLD MANUAL: 14 % (ref 0–5)
WBC MORPH BLD: NORMAL
WBC NRBC COR # BLD: 27.48 10*3/MM3 (ref 4.8–10.8)

## 2018-05-16 PROCEDURE — 80048 BASIC METABOLIC PNL TOTAL CA: CPT | Performed by: FAMILY MEDICINE

## 2018-05-16 PROCEDURE — 97110 THERAPEUTIC EXERCISES: CPT

## 2018-05-16 PROCEDURE — 85007 BL SMEAR W/DIFF WBC COUNT: CPT | Performed by: FAMILY MEDICINE

## 2018-05-16 PROCEDURE — 85730 THROMBOPLASTIN TIME PARTIAL: CPT | Performed by: PHYSICIAN ASSISTANT

## 2018-05-16 PROCEDURE — 63710000001 INSULIN LISPRO (HUMAN) PER 5 UNITS: Performed by: INTERNAL MEDICINE

## 2018-05-16 PROCEDURE — 82962 GLUCOSE BLOOD TEST: CPT

## 2018-05-16 PROCEDURE — 85025 COMPLETE CBC W/AUTO DIFF WBC: CPT | Performed by: FAMILY MEDICINE

## 2018-05-16 PROCEDURE — 97116 GAIT TRAINING THERAPY: CPT

## 2018-05-16 PROCEDURE — 97530 THERAPEUTIC ACTIVITIES: CPT

## 2018-05-16 PROCEDURE — 63710000001 INSULIN DETEMIR PER 5 UNITS: Performed by: INTERNAL MEDICINE

## 2018-05-16 RX ADMIN — Medication 250 MG: at 21:45

## 2018-05-16 RX ADMIN — INSULIN DETEMIR 20 UNITS: 100 INJECTION, SOLUTION SUBCUTANEOUS at 21:49

## 2018-05-16 RX ADMIN — MEGESTROL ACETATE 800 MG: 40 SUSPENSION ORAL at 09:05

## 2018-05-16 RX ADMIN — FAMOTIDINE 20 MG: 20 TABLET, FILM COATED ORAL at 09:02

## 2018-05-16 RX ADMIN — ATORVASTATIN CALCIUM 10 MG: 10 TABLET, FILM COATED ORAL at 21:45

## 2018-05-16 RX ADMIN — FAMOTIDINE 20 MG: 20 TABLET, FILM COATED ORAL at 21:45

## 2018-05-16 RX ADMIN — INSULIN DETEMIR 20 UNITS: 100 INJECTION, SOLUTION SUBCUTANEOUS at 09:02

## 2018-05-16 RX ADMIN — TRAMADOL HYDROCHLORIDE 50 MG: 50 TABLET, COATED ORAL at 05:26

## 2018-05-16 RX ADMIN — MODAFINIL 200 MG: 100 TABLET ORAL at 09:02

## 2018-05-16 RX ADMIN — VANCOMYCIN 250 MG: KIT at 05:26

## 2018-05-16 RX ADMIN — VANCOMYCIN 250 MG: KIT at 21:46

## 2018-05-16 RX ADMIN — ESCITSLOPRAM 20 MG: 10 TABLET ORAL at 09:02

## 2018-05-16 RX ADMIN — Medication 250 MG: at 09:03

## 2018-05-16 RX ADMIN — AMLODIPINE BESYLATE 5 MG: 5 TABLET ORAL at 09:03

## 2018-05-16 RX ADMIN — MEMANTINE HYDROCHLORIDE 10 MG: 5 TABLET, FILM COATED ORAL at 09:02

## 2018-05-16 RX ADMIN — FUROSEMIDE 20 MG: 20 TABLET ORAL at 09:02

## 2018-05-16 RX ADMIN — VANCOMYCIN 250 MG: KIT at 12:18

## 2018-05-16 RX ADMIN — INSULIN LISPRO 4 UNITS: 100 INJECTION, SOLUTION INTRAVENOUS; SUBCUTANEOUS at 12:17

## 2018-05-16 RX ADMIN — SODIUM CHLORIDE, POTASSIUM CHLORIDE, SODIUM LACTATE AND CALCIUM CHLORIDE 75 ML/HR: 600; 310; 30; 20 INJECTION, SOLUTION INTRAVENOUS at 16:51

## 2018-05-17 LAB
ANION GAP SERPL CALCULATED.3IONS-SCNC: 11 MMOL/L (ref 4–13)
APTT PPP: 44.8 SECONDS (ref 24.1–34.8)
BUN BLD-MCNC: 27 MG/DL (ref 5–21)
BUN/CREAT SERPL: 16.4 (ref 7–25)
CALCIUM SPEC-SCNC: 8.9 MG/DL (ref 8.4–10.4)
CHLORIDE SERPL-SCNC: 110 MMOL/L (ref 98–110)
CO2 SERPL-SCNC: 20 MMOL/L (ref 24–31)
CREAT BLD-MCNC: 1.65 MG/DL (ref 0.5–1.4)
GFR SERPL CREATININE-BSD FRML MDRD: 30 ML/MIN/1.73
GLUCOSE BLD-MCNC: 55 MG/DL (ref 70–100)
GLUCOSE BLDC GLUCOMTR-MCNC: 135 MG/DL (ref 70–130)
GLUCOSE BLDC GLUCOMTR-MCNC: 151 MG/DL (ref 70–130)
GLUCOSE BLDC GLUCOMTR-MCNC: 64 MG/DL (ref 70–130)
GLUCOSE BLDC GLUCOMTR-MCNC: 88 MG/DL (ref 70–130)
GLUCOSE BLDC GLUCOMTR-MCNC: 95 MG/DL (ref 70–130)
INR PPP: 1.06 (ref 0.91–1.09)
POTASSIUM BLD-SCNC: 3.9 MMOL/L (ref 3.5–5.3)
PROTHROMBIN TIME: 14.1 SECONDS (ref 11.9–14.6)
REF LAB TEST METHOD: NORMAL
SODIUM BLD-SCNC: 141 MMOL/L (ref 135–145)

## 2018-05-17 PROCEDURE — 97110 THERAPEUTIC EXERCISES: CPT

## 2018-05-17 PROCEDURE — 85610 PROTHROMBIN TIME: CPT | Performed by: INTERNAL MEDICINE

## 2018-05-17 PROCEDURE — 63710000001 INSULIN LISPRO (HUMAN) PER 5 UNITS: Performed by: INTERNAL MEDICINE

## 2018-05-17 PROCEDURE — 82962 GLUCOSE BLOOD TEST: CPT

## 2018-05-17 PROCEDURE — 85730 THROMBOPLASTIN TIME PARTIAL: CPT | Performed by: INTERNAL MEDICINE

## 2018-05-17 PROCEDURE — 97530 THERAPEUTIC ACTIVITIES: CPT

## 2018-05-17 PROCEDURE — 97116 GAIT TRAINING THERAPY: CPT

## 2018-05-17 PROCEDURE — 80048 BASIC METABOLIC PNL TOTAL CA: CPT | Performed by: INTERNAL MEDICINE

## 2018-05-17 RX ORDER — FAMOTIDINE 20 MG/1
20 TABLET, FILM COATED ORAL DAILY
Status: DISCONTINUED | OUTPATIENT
Start: 2018-05-18 | End: 2018-05-23 | Stop reason: HOSPADM

## 2018-05-17 RX ADMIN — Medication 250 MG: at 21:29

## 2018-05-17 RX ADMIN — VANCOMYCIN 250 MG: KIT at 05:16

## 2018-05-17 RX ADMIN — DEXTROSE MONOHYDRATE 25 G: 25 INJECTION, SOLUTION INTRAVENOUS at 08:05

## 2018-05-17 RX ADMIN — MEMANTINE HYDROCHLORIDE 10 MG: 5 TABLET, FILM COATED ORAL at 17:31

## 2018-05-17 RX ADMIN — VANCOMYCIN 250 MG: KIT at 17:30

## 2018-05-17 RX ADMIN — SODIUM CHLORIDE, POTASSIUM CHLORIDE, SODIUM LACTATE AND CALCIUM CHLORIDE 75 ML/HR: 600; 310; 30; 20 INJECTION, SOLUTION INTRAVENOUS at 21:29

## 2018-05-17 RX ADMIN — AMLODIPINE BESYLATE 5 MG: 5 TABLET ORAL at 17:31

## 2018-05-17 RX ADMIN — ESCITSLOPRAM 20 MG: 10 TABLET ORAL at 17:31

## 2018-05-17 RX ADMIN — SODIUM CHLORIDE, POTASSIUM CHLORIDE, SODIUM LACTATE AND CALCIUM CHLORIDE 75 ML/HR: 600; 310; 30; 20 INJECTION, SOLUTION INTRAVENOUS at 06:36

## 2018-05-17 RX ADMIN — INSULIN LISPRO 2 UNITS: 100 INJECTION, SOLUTION INTRAVENOUS; SUBCUTANEOUS at 21:29

## 2018-05-17 RX ADMIN — VANCOMYCIN 250 MG: KIT at 01:18

## 2018-05-17 RX ADMIN — MODAFINIL 200 MG: 100 TABLET ORAL at 17:31

## 2018-05-17 RX ADMIN — MEGESTROL ACETATE 800 MG: 40 SUSPENSION ORAL at 17:30

## 2018-05-17 RX ADMIN — ATORVASTATIN CALCIUM 10 MG: 10 TABLET, FILM COATED ORAL at 21:29

## 2018-05-18 ENCOUNTER — APPOINTMENT (OUTPATIENT)
Dept: ULTRASOUND IMAGING | Facility: HOSPITAL | Age: 73
End: 2018-05-18

## 2018-05-18 LAB
ALBUMIN SERPL-MCNC: 2.9 G/DL (ref 3.5–5)
ALBUMIN/GLOB SERPL: 0.8 G/DL (ref 1.1–2.5)
ALP SERPL-CCNC: 289 U/L (ref 24–120)
ALT SERPL W P-5'-P-CCNC: 30 U/L (ref 0–54)
ANION GAP SERPL CALCULATED.3IONS-SCNC: 11 MMOL/L (ref 4–13)
APTT PPP: 42 SECONDS (ref 24.1–34.8)
AST SERPL-CCNC: 109 U/L (ref 7–45)
BILIRUB SERPL-MCNC: 0.5 MG/DL (ref 0.1–1)
BUN BLD-MCNC: 18 MG/DL (ref 5–21)
BUN/CREAT SERPL: 15.8 (ref 7–25)
CALCIUM SPEC-SCNC: 9.2 MG/DL (ref 8.4–10.4)
CHLORIDE SERPL-SCNC: 111 MMOL/L (ref 98–110)
CO2 SERPL-SCNC: 22 MMOL/L (ref 24–31)
CREAT BLD-MCNC: 1.14 MG/DL (ref 0.5–1.4)
DEPRECATED RDW RBC AUTO: 50 FL (ref 40–54)
ERYTHROCYTE [DISTWIDTH] IN BLOOD BY AUTOMATED COUNT: 15.7 % (ref 12–15)
GFR SERPL CREATININE-BSD FRML MDRD: 47 ML/MIN/1.73
GLOBULIN UR ELPH-MCNC: 3.5 GM/DL
GLUCOSE BLD-MCNC: 141 MG/DL (ref 70–100)
GLUCOSE BLDC GLUCOMTR-MCNC: 132 MG/DL (ref 70–130)
GLUCOSE BLDC GLUCOMTR-MCNC: 138 MG/DL (ref 70–130)
GLUCOSE BLDC GLUCOMTR-MCNC: 145 MG/DL (ref 70–130)
GLUCOSE BLDC GLUCOMTR-MCNC: 157 MG/DL (ref 70–130)
HCT VFR BLD AUTO: 37 % (ref 37–47)
HGB BLD-MCNC: 11.8 G/DL (ref 12–16)
INR PPP: 1.13 (ref 0.91–1.09)
MCH RBC QN AUTO: 28.2 PG (ref 28–32)
MCHC RBC AUTO-ENTMCNC: 31.9 G/DL (ref 33–36)
MCV RBC AUTO: 88.3 FL (ref 82–98)
PLATELET # BLD AUTO: 569 10*3/MM3 (ref 130–400)
PMV BLD AUTO: 11.4 FL (ref 6–12)
POTASSIUM BLD-SCNC: 3.9 MMOL/L (ref 3.5–5.3)
PROT SERPL-MCNC: 6.4 G/DL (ref 6.3–8.7)
PROTHROMBIN TIME: 14.9 SECONDS (ref 11.9–14.6)
RBC # BLD AUTO: 4.19 10*6/MM3 (ref 4.2–5.4)
SODIUM BLD-SCNC: 144 MMOL/L (ref 135–145)
WBC NRBC COR # BLD: 25.52 10*3/MM3 (ref 4.8–10.8)

## 2018-05-18 PROCEDURE — 88341 IMHCHEM/IMCYTCHM EA ADD ANTB: CPT | Performed by: INTERNAL MEDICINE

## 2018-05-18 PROCEDURE — 88342 IMHCHEM/IMCYTCHM 1ST ANTB: CPT | Performed by: INTERNAL MEDICINE

## 2018-05-18 PROCEDURE — 82962 GLUCOSE BLOOD TEST: CPT

## 2018-05-18 PROCEDURE — 97535 SELF CARE MNGMENT TRAINING: CPT

## 2018-05-18 PROCEDURE — 76942 ECHO GUIDE FOR BIOPSY: CPT

## 2018-05-18 PROCEDURE — 80053 COMPREHEN METABOLIC PANEL: CPT | Performed by: INTERNAL MEDICINE

## 2018-05-18 PROCEDURE — 88307 TISSUE EXAM BY PATHOLOGIST: CPT | Performed by: INTERNAL MEDICINE

## 2018-05-18 PROCEDURE — 85730 THROMBOPLASTIN TIME PARTIAL: CPT | Performed by: INTERNAL MEDICINE

## 2018-05-18 PROCEDURE — 85610 PROTHROMBIN TIME: CPT | Performed by: INTERNAL MEDICINE

## 2018-05-18 PROCEDURE — 85027 COMPLETE CBC AUTOMATED: CPT | Performed by: INTERNAL MEDICINE

## 2018-05-18 PROCEDURE — 0FB13ZX EXCISION OF RIGHT LOBE LIVER, PERCUTANEOUS APPROACH, DIAGNOSTIC: ICD-10-PCS | Performed by: INTERNAL MEDICINE

## 2018-05-18 PROCEDURE — 97110 THERAPEUTIC EXERCISES: CPT

## 2018-05-18 PROCEDURE — 97116 GAIT TRAINING THERAPY: CPT

## 2018-05-18 RX ORDER — BENZONATATE 100 MG/1
200 CAPSULE ORAL EVERY 8 HOURS PRN
Status: DISCONTINUED | OUTPATIENT
Start: 2018-05-18 | End: 2018-05-23 | Stop reason: HOSPADM

## 2018-05-18 RX ADMIN — VANCOMYCIN 250 MG: KIT at 00:17

## 2018-05-18 RX ADMIN — VANCOMYCIN 250 MG: KIT at 06:24

## 2018-05-18 RX ADMIN — Medication 250 MG: at 21:06

## 2018-05-18 RX ADMIN — FAMOTIDINE 20 MG: 20 TABLET, FILM COATED ORAL at 09:07

## 2018-05-18 RX ADMIN — MEMANTINE HYDROCHLORIDE 10 MG: 5 TABLET, FILM COATED ORAL at 09:07

## 2018-05-18 RX ADMIN — AMLODIPINE BESYLATE 5 MG: 5 TABLET ORAL at 09:08

## 2018-05-18 RX ADMIN — Medication 250 MG: at 09:07

## 2018-05-18 RX ADMIN — ATORVASTATIN CALCIUM 10 MG: 10 TABLET, FILM COATED ORAL at 21:06

## 2018-05-18 RX ADMIN — VANCOMYCIN 250 MG: KIT at 18:14

## 2018-05-18 RX ADMIN — BENZONATATE 200 MG: 100 CAPSULE, LIQUID FILLED ORAL at 04:29

## 2018-05-18 RX ADMIN — ESCITSLOPRAM 20 MG: 10 TABLET ORAL at 09:07

## 2018-05-18 RX ADMIN — TRAMADOL HYDROCHLORIDE 50 MG: 50 TABLET, COATED ORAL at 01:45

## 2018-05-18 RX ADMIN — MEGESTROL ACETATE 800 MG: 40 SUSPENSION ORAL at 09:07

## 2018-05-18 RX ADMIN — SODIUM CHLORIDE, POTASSIUM CHLORIDE, SODIUM LACTATE AND CALCIUM CHLORIDE 20 ML/HR: 600; 310; 30; 20 INJECTION, SOLUTION INTRAVENOUS at 19:48

## 2018-05-19 LAB
ANION GAP SERPL CALCULATED.3IONS-SCNC: 9 MMOL/L (ref 4–13)
BUN BLD-MCNC: 13 MG/DL (ref 5–21)
BUN/CREAT SERPL: 13 (ref 7–25)
CALCIUM SPEC-SCNC: 9.1 MG/DL (ref 8.4–10.4)
CHLORIDE SERPL-SCNC: 111 MMOL/L (ref 98–110)
CO2 SERPL-SCNC: 22 MMOL/L (ref 24–31)
CREAT BLD-MCNC: 1 MG/DL (ref 0.5–1.4)
DEPRECATED RDW RBC AUTO: 50.4 FL (ref 40–54)
ERYTHROCYTE [DISTWIDTH] IN BLOOD BY AUTOMATED COUNT: 15.9 % (ref 12–15)
GFR SERPL CREATININE-BSD FRML MDRD: 54 ML/MIN/1.73
GLUCOSE BLD-MCNC: 114 MG/DL (ref 70–100)
GLUCOSE BLDC GLUCOMTR-MCNC: 106 MG/DL (ref 70–130)
GLUCOSE BLDC GLUCOMTR-MCNC: 135 MG/DL (ref 70–130)
GLUCOSE BLDC GLUCOMTR-MCNC: 162 MG/DL (ref 70–130)
GLUCOSE BLDC GLUCOMTR-MCNC: 183 MG/DL (ref 70–130)
HCT VFR BLD AUTO: 36.4 % (ref 37–47)
HGB BLD-MCNC: 11.7 G/DL (ref 12–16)
MCH RBC QN AUTO: 28.4 PG (ref 28–32)
MCHC RBC AUTO-ENTMCNC: 32.1 G/DL (ref 33–36)
MCV RBC AUTO: 88.3 FL (ref 82–98)
PLATELET # BLD AUTO: 646 10*3/MM3 (ref 130–400)
PMV BLD AUTO: 11.4 FL (ref 6–12)
POTASSIUM BLD-SCNC: 3.9 MMOL/L (ref 3.5–5.3)
RBC # BLD AUTO: 4.12 10*6/MM3 (ref 4.2–5.4)
SODIUM BLD-SCNC: 142 MMOL/L (ref 135–145)
WBC NRBC COR # BLD: 25.61 10*3/MM3 (ref 4.8–10.8)

## 2018-05-19 PROCEDURE — 82962 GLUCOSE BLOOD TEST: CPT

## 2018-05-19 PROCEDURE — 63710000001 INSULIN LISPRO (HUMAN) PER 5 UNITS: Performed by: INTERNAL MEDICINE

## 2018-05-19 PROCEDURE — 80048 BASIC METABOLIC PNL TOTAL CA: CPT | Performed by: INTERNAL MEDICINE

## 2018-05-19 PROCEDURE — 85027 COMPLETE CBC AUTOMATED: CPT | Performed by: INTERNAL MEDICINE

## 2018-05-19 PROCEDURE — 97116 GAIT TRAINING THERAPY: CPT

## 2018-05-19 RX ADMIN — Medication 250 MG: at 10:19

## 2018-05-19 RX ADMIN — VANCOMYCIN 250 MG: KIT at 06:04

## 2018-05-19 RX ADMIN — ATORVASTATIN CALCIUM 10 MG: 10 TABLET, FILM COATED ORAL at 20:33

## 2018-05-19 RX ADMIN — ESCITSLOPRAM 20 MG: 10 TABLET ORAL at 10:18

## 2018-05-19 RX ADMIN — Medication 250 MG: at 20:33

## 2018-05-19 RX ADMIN — MEGESTROL ACETATE 800 MG: 40 SUSPENSION ORAL at 10:17

## 2018-05-19 RX ADMIN — FAMOTIDINE 20 MG: 20 TABLET, FILM COATED ORAL at 10:19

## 2018-05-19 RX ADMIN — VANCOMYCIN 250 MG: KIT at 18:31

## 2018-05-19 RX ADMIN — TRAMADOL HYDROCHLORIDE 50 MG: 50 TABLET, COATED ORAL at 10:17

## 2018-05-19 RX ADMIN — INSULIN LISPRO 2 UNITS: 100 INJECTION, SOLUTION INTRAVENOUS; SUBCUTANEOUS at 20:42

## 2018-05-19 RX ADMIN — VANCOMYCIN 250 MG: KIT at 23:10

## 2018-05-19 RX ADMIN — TRAMADOL HYDROCHLORIDE 50 MG: 50 TABLET, COATED ORAL at 18:31

## 2018-05-19 RX ADMIN — MEMANTINE HYDROCHLORIDE 10 MG: 5 TABLET, FILM COATED ORAL at 10:18

## 2018-05-19 RX ADMIN — AMLODIPINE BESYLATE 5 MG: 5 TABLET ORAL at 10:18

## 2018-05-19 RX ADMIN — VANCOMYCIN 250 MG: KIT at 15:57

## 2018-05-20 LAB
GLUCOSE BLDC GLUCOMTR-MCNC: 120 MG/DL (ref 70–130)
GLUCOSE BLDC GLUCOMTR-MCNC: 133 MG/DL (ref 70–130)
GLUCOSE BLDC GLUCOMTR-MCNC: 134 MG/DL (ref 70–130)
GLUCOSE BLDC GLUCOMTR-MCNC: 158 MG/DL (ref 70–130)

## 2018-05-20 PROCEDURE — 82962 GLUCOSE BLOOD TEST: CPT

## 2018-05-20 PROCEDURE — 63710000001 INSULIN LISPRO (HUMAN) PER 5 UNITS: Performed by: INTERNAL MEDICINE

## 2018-05-20 RX ADMIN — VANCOMYCIN 250 MG: KIT at 18:33

## 2018-05-20 RX ADMIN — INSULIN LISPRO 2 UNITS: 100 INJECTION, SOLUTION INTRAVENOUS; SUBCUTANEOUS at 08:22

## 2018-05-20 RX ADMIN — ESCITSLOPRAM 20 MG: 10 TABLET ORAL at 08:20

## 2018-05-20 RX ADMIN — VANCOMYCIN 250 MG: KIT at 22:57

## 2018-05-20 RX ADMIN — MEGESTROL ACETATE 800 MG: 40 SUSPENSION ORAL at 08:20

## 2018-05-20 RX ADMIN — MODAFINIL 200 MG: 100 TABLET ORAL at 08:20

## 2018-05-20 RX ADMIN — AMLODIPINE BESYLATE 5 MG: 5 TABLET ORAL at 08:20

## 2018-05-20 RX ADMIN — TRAMADOL HYDROCHLORIDE 50 MG: 50 TABLET, COATED ORAL at 19:49

## 2018-05-20 RX ADMIN — Medication 250 MG: at 08:20

## 2018-05-20 RX ADMIN — VANCOMYCIN 250 MG: KIT at 05:53

## 2018-05-20 RX ADMIN — FAMOTIDINE 20 MG: 20 TABLET, FILM COATED ORAL at 08:20

## 2018-05-20 RX ADMIN — Medication 250 MG: at 19:50

## 2018-05-20 RX ADMIN — VANCOMYCIN 250 MG: KIT at 12:54

## 2018-05-20 RX ADMIN — MEMANTINE HYDROCHLORIDE 10 MG: 5 TABLET, FILM COATED ORAL at 08:20

## 2018-05-20 RX ADMIN — ATORVASTATIN CALCIUM 10 MG: 10 TABLET, FILM COATED ORAL at 19:50

## 2018-05-21 LAB
GLUCOSE BLDC GLUCOMTR-MCNC: 112 MG/DL (ref 70–130)
GLUCOSE BLDC GLUCOMTR-MCNC: 127 MG/DL (ref 70–130)
GLUCOSE BLDC GLUCOMTR-MCNC: 155 MG/DL (ref 70–130)
GLUCOSE BLDC GLUCOMTR-MCNC: 181 MG/DL (ref 70–130)

## 2018-05-21 PROCEDURE — 63710000001 INSULIN LISPRO (HUMAN) PER 5 UNITS: Performed by: INTERNAL MEDICINE

## 2018-05-21 PROCEDURE — 82962 GLUCOSE BLOOD TEST: CPT

## 2018-05-21 PROCEDURE — 97110 THERAPEUTIC EXERCISES: CPT

## 2018-05-21 RX ADMIN — FAMOTIDINE 20 MG: 20 TABLET, FILM COATED ORAL at 09:40

## 2018-05-21 RX ADMIN — VANCOMYCIN 250 MG: KIT at 17:36

## 2018-05-21 RX ADMIN — ESCITSLOPRAM 20 MG: 10 TABLET ORAL at 09:40

## 2018-05-21 RX ADMIN — ATORVASTATIN CALCIUM 10 MG: 10 TABLET, FILM COATED ORAL at 21:57

## 2018-05-21 RX ADMIN — MEGESTROL ACETATE 800 MG: 40 SUSPENSION ORAL at 09:40

## 2018-05-21 RX ADMIN — VANCOMYCIN 250 MG: KIT at 05:47

## 2018-05-21 RX ADMIN — INSULIN LISPRO 2 UNITS: 100 INJECTION, SOLUTION INTRAVENOUS; SUBCUTANEOUS at 12:06

## 2018-05-21 RX ADMIN — MEMANTINE HYDROCHLORIDE 10 MG: 5 TABLET, FILM COATED ORAL at 09:40

## 2018-05-21 RX ADMIN — AMLODIPINE BESYLATE 5 MG: 5 TABLET ORAL at 09:40

## 2018-05-21 RX ADMIN — Medication 250 MG: at 21:57

## 2018-05-21 RX ADMIN — INSULIN LISPRO 2 UNITS: 100 INJECTION, SOLUTION INTRAVENOUS; SUBCUTANEOUS at 17:35

## 2018-05-21 RX ADMIN — VANCOMYCIN 250 MG: KIT at 12:06

## 2018-05-21 RX ADMIN — Medication 250 MG: at 09:40

## 2018-05-22 LAB
ALBUMIN SERPL-MCNC: 3.4 G/DL (ref 3.5–5)
ALBUMIN/GLOB SERPL: 0.9 G/DL (ref 1.1–2.5)
ALP SERPL-CCNC: 333 U/L (ref 24–120)
ALT SERPL W P-5'-P-CCNC: 32 U/L (ref 0–54)
ANION GAP SERPL CALCULATED.3IONS-SCNC: 10 MMOL/L (ref 4–13)
AST SERPL-CCNC: 152 U/L (ref 7–45)
BILIRUB SERPL-MCNC: 0.6 MG/DL (ref 0.1–1)
BUN BLD-MCNC: 12 MG/DL (ref 5–21)
BUN/CREAT SERPL: 12.4 (ref 7–25)
CALCIUM SPEC-SCNC: 9.8 MG/DL (ref 8.4–10.4)
CHLORIDE SERPL-SCNC: 109 MMOL/L (ref 98–110)
CO2 SERPL-SCNC: 23 MMOL/L (ref 24–31)
CREAT BLD-MCNC: 0.97 MG/DL (ref 0.5–1.4)
CYTO UR: NORMAL
DEPRECATED RDW RBC AUTO: 52.6 FL (ref 40–54)
EOSINOPHIL # BLD MANUAL: 0.31 10*3/MM3 (ref 0–0.7)
EOSINOPHIL NFR BLD MANUAL: 1 % (ref 0–4)
ERYTHROCYTE [DISTWIDTH] IN BLOOD BY AUTOMATED COUNT: 16.2 % (ref 12–15)
GFR SERPL CREATININE-BSD FRML MDRD: 56 ML/MIN/1.73
GLOBULIN UR ELPH-MCNC: 4 GM/DL
GLUCOSE BLD-MCNC: 121 MG/DL (ref 70–100)
GLUCOSE BLDC GLUCOMTR-MCNC: 122 MG/DL (ref 70–130)
GLUCOSE BLDC GLUCOMTR-MCNC: 139 MG/DL (ref 70–130)
GLUCOSE BLDC GLUCOMTR-MCNC: 171 MG/DL (ref 70–130)
GLUCOSE BLDC GLUCOMTR-MCNC: 185 MG/DL (ref 70–130)
HCT VFR BLD AUTO: 39.6 % (ref 37–47)
HGB BLD-MCNC: 12.7 G/DL (ref 12–16)
LAB AP CASE REPORT: NORMAL
LAB AP DIAGNOSIS COMMENT: NORMAL
LAB AP INTRADEPARTMENTAL CONSULT: NORMAL
LYMPHOCYTES # BLD MANUAL: 10.51 10*3/MM3 (ref 0.72–4.86)
LYMPHOCYTES NFR BLD MANUAL: 34 % (ref 15–45)
LYMPHOCYTES NFR BLD MANUAL: 4 % (ref 4–12)
Lab: NORMAL
MCH RBC QN AUTO: 29 PG (ref 28–32)
MCHC RBC AUTO-ENTMCNC: 32.1 G/DL (ref 33–36)
MCV RBC AUTO: 90.4 FL (ref 82–98)
MONOCYTES # BLD AUTO: 1.24 10*3/MM3 (ref 0.19–1.3)
NEUTROPHILS # BLD AUTO: 14.53 10*3/MM3 (ref 1.87–8.4)
NEUTROPHILS NFR BLD MANUAL: 44 % (ref 39–78)
NEUTS BAND NFR BLD MANUAL: 3 % (ref 0–10)
PATH REPORT.FINAL DX SPEC: NORMAL
PATH REPORT.GROSS SPEC: NORMAL
PLATELET # BLD AUTO: 676 10*3/MM3 (ref 130–400)
PMV BLD AUTO: 11.4 FL (ref 6–12)
POIKILOCYTOSIS BLD QL SMEAR: ABNORMAL
POLYCHROMASIA BLD QL SMEAR: ABNORMAL
POTASSIUM BLD-SCNC: 4.5 MMOL/L (ref 3.5–5.3)
PROT SERPL-MCNC: 7.4 G/DL (ref 6.3–8.7)
RBC # BLD AUTO: 4.38 10*6/MM3 (ref 4.2–5.4)
SMALL PLATELETS BLD QL SMEAR: ABNORMAL
SODIUM BLD-SCNC: 142 MMOL/L (ref 135–145)
VARIANT LYMPHS NFR BLD MANUAL: 14 % (ref 0–5)
WBC MORPH BLD: NORMAL
WBC NRBC COR # BLD: 30.91 10*3/MM3 (ref 4.8–10.8)

## 2018-05-22 PROCEDURE — 63710000001 INSULIN LISPRO (HUMAN) PER 5 UNITS: Performed by: INTERNAL MEDICINE

## 2018-05-22 PROCEDURE — G8978 MOBILITY CURRENT STATUS: HCPCS | Performed by: PHYSICAL THERAPIST

## 2018-05-22 PROCEDURE — 85025 COMPLETE CBC W/AUTO DIFF WBC: CPT | Performed by: INTERNAL MEDICINE

## 2018-05-22 PROCEDURE — 85007 BL SMEAR W/DIFF WBC COUNT: CPT | Performed by: INTERNAL MEDICINE

## 2018-05-22 PROCEDURE — 82962 GLUCOSE BLOOD TEST: CPT

## 2018-05-22 PROCEDURE — 80053 COMPREHEN METABOLIC PANEL: CPT | Performed by: INTERNAL MEDICINE

## 2018-05-22 PROCEDURE — G8979 MOBILITY GOAL STATUS: HCPCS | Performed by: PHYSICAL THERAPIST

## 2018-05-22 PROCEDURE — 97535 SELF CARE MNGMENT TRAINING: CPT

## 2018-05-22 PROCEDURE — 97164 PT RE-EVAL EST PLAN CARE: CPT | Performed by: PHYSICAL THERAPIST

## 2018-05-22 PROCEDURE — 97116 GAIT TRAINING THERAPY: CPT

## 2018-05-22 RX ADMIN — MEGESTROL ACETATE 800 MG: 40 SUSPENSION ORAL at 09:09

## 2018-05-22 RX ADMIN — VANCOMYCIN 250 MG: KIT at 06:40

## 2018-05-22 RX ADMIN — Medication 250 MG: at 23:10

## 2018-05-22 RX ADMIN — INSULIN LISPRO 2 UNITS: 100 INJECTION, SOLUTION INTRAVENOUS; SUBCUTANEOUS at 11:53

## 2018-05-22 RX ADMIN — AMLODIPINE BESYLATE 5 MG: 5 TABLET ORAL at 09:10

## 2018-05-22 RX ADMIN — INSULIN LISPRO 2 UNITS: 100 INJECTION, SOLUTION INTRAVENOUS; SUBCUTANEOUS at 17:36

## 2018-05-22 RX ADMIN — FAMOTIDINE 20 MG: 20 TABLET, FILM COATED ORAL at 09:10

## 2018-05-22 RX ADMIN — TRAMADOL HYDROCHLORIDE 50 MG: 50 TABLET, COATED ORAL at 15:37

## 2018-05-22 RX ADMIN — VANCOMYCIN 250 MG: KIT at 17:36

## 2018-05-22 RX ADMIN — VANCOMYCIN 250 MG: KIT at 11:53

## 2018-05-22 RX ADMIN — ATORVASTATIN CALCIUM 10 MG: 10 TABLET, FILM COATED ORAL at 23:10

## 2018-05-22 RX ADMIN — ESCITSLOPRAM 20 MG: 10 TABLET ORAL at 09:10

## 2018-05-22 RX ADMIN — VANCOMYCIN 250 MG: KIT at 00:36

## 2018-05-22 RX ADMIN — Medication 250 MG: at 09:10

## 2018-05-22 RX ADMIN — MEMANTINE HYDROCHLORIDE 10 MG: 5 TABLET, FILM COATED ORAL at 09:09

## 2018-05-22 RX ADMIN — TRAMADOL HYDROCHLORIDE 50 MG: 50 TABLET, COATED ORAL at 23:10

## 2018-05-23 ENCOUNTER — APPOINTMENT (OUTPATIENT)
Dept: CARDIOLOGY | Facility: HOSPITAL | Age: 73
End: 2018-05-23
Attending: FAMILY MEDICINE

## 2018-05-23 VITALS
HEART RATE: 69 BPM | DIASTOLIC BLOOD PRESSURE: 68 MMHG | BODY MASS INDEX: 32.96 KG/M2 | HEIGHT: 67 IN | WEIGHT: 210 LBS | SYSTOLIC BLOOD PRESSURE: 136 MMHG | OXYGEN SATURATION: 92 % | TEMPERATURE: 98.6 F | RESPIRATION RATE: 16 BRPM

## 2018-05-23 LAB
ALBUMIN SERPL-MCNC: 3.1 G/DL (ref 3.5–5)
ALBUMIN/GLOB SERPL: 0.9 G/DL (ref 1.1–2.5)
ALP SERPL-CCNC: 290 U/L (ref 24–120)
ALT SERPL W P-5'-P-CCNC: 25 U/L (ref 0–54)
ANION GAP SERPL CALCULATED.3IONS-SCNC: 11 MMOL/L (ref 4–13)
AST SERPL-CCNC: 142 U/L (ref 7–45)
BILIRUB SERPL-MCNC: 0.5 MG/DL (ref 0.1–1)
BUN BLD-MCNC: 11 MG/DL (ref 5–21)
BUN/CREAT SERPL: 11.1 (ref 7–25)
CALCIUM SPEC-SCNC: 10 MG/DL (ref 8.4–10.4)
CHLORIDE SERPL-SCNC: 111 MMOL/L (ref 98–110)
CO2 SERPL-SCNC: 21 MMOL/L (ref 24–31)
CREAT BLD-MCNC: 0.99 MG/DL (ref 0.5–1.4)
DEPRECATED RDW RBC AUTO: 50.5 FL (ref 40–54)
EOSINOPHIL # BLD MANUAL: 0.29 10*3/MM3 (ref 0–0.7)
EOSINOPHIL NFR BLD MANUAL: 1 % (ref 0–4)
ERYTHROCYTE [DISTWIDTH] IN BLOOD BY AUTOMATED COUNT: 15.9 % (ref 12–15)
GFR SERPL CREATININE-BSD FRML MDRD: 55 ML/MIN/1.73
GLOBULIN UR ELPH-MCNC: 3.6 GM/DL
GLUCOSE BLD-MCNC: 122 MG/DL (ref 70–100)
GLUCOSE BLDC GLUCOMTR-MCNC: 134 MG/DL (ref 70–130)
GLUCOSE BLDC GLUCOMTR-MCNC: 161 MG/DL (ref 70–130)
HCT VFR BLD AUTO: 36.4 % (ref 37–47)
HGB BLD-MCNC: 11.7 G/DL (ref 12–16)
LYMPHOCYTES # BLD MANUAL: 17.97 10*3/MM3 (ref 0.72–4.86)
LYMPHOCYTES NFR BLD MANUAL: 4 % (ref 4–12)
LYMPHOCYTES NFR BLD MANUAL: 62 % (ref 15–45)
MCH RBC QN AUTO: 28.5 PG (ref 28–32)
MCHC RBC AUTO-ENTMCNC: 32.1 G/DL (ref 33–36)
MCV RBC AUTO: 88.6 FL (ref 82–98)
MONOCYTES # BLD AUTO: 1.16 10*3/MM3 (ref 0.19–1.3)
NEUTROPHILS # BLD AUTO: 9.56 10*3/MM3 (ref 1.87–8.4)
NEUTROPHILS NFR BLD MANUAL: 33 % (ref 39–78)
PLATELET # BLD AUTO: 702 10*3/MM3 (ref 130–400)
PMV BLD AUTO: 10.2 FL (ref 6–12)
POIKILOCYTOSIS BLD QL SMEAR: ABNORMAL
POTASSIUM BLD-SCNC: 4.3 MMOL/L (ref 3.5–5.3)
PROT SERPL-MCNC: 6.7 G/DL (ref 6.3–8.7)
RBC # BLD AUTO: 4.11 10*6/MM3 (ref 4.2–5.4)
SMALL PLATELETS BLD QL SMEAR: ABNORMAL
SODIUM BLD-SCNC: 143 MMOL/L (ref 135–145)
WBC MORPH BLD: NORMAL
WBC NRBC COR # BLD: 28.98 10*3/MM3 (ref 4.8–10.8)

## 2018-05-23 PROCEDURE — 93225 XTRNL ECG REC<48 HRS REC: CPT

## 2018-05-23 PROCEDURE — 80053 COMPREHEN METABOLIC PANEL: CPT | Performed by: FAMILY MEDICINE

## 2018-05-23 PROCEDURE — 93226 XTRNL ECG REC<48 HR SCAN A/R: CPT

## 2018-05-23 PROCEDURE — 85007 BL SMEAR W/DIFF WBC COUNT: CPT | Performed by: FAMILY MEDICINE

## 2018-05-23 PROCEDURE — 63710000001 INSULIN LISPRO (HUMAN) PER 5 UNITS: Performed by: INTERNAL MEDICINE

## 2018-05-23 PROCEDURE — 85025 COMPLETE CBC W/AUTO DIFF WBC: CPT | Performed by: FAMILY MEDICINE

## 2018-05-23 PROCEDURE — 82962 GLUCOSE BLOOD TEST: CPT

## 2018-05-23 PROCEDURE — 97110 THERAPEUTIC EXERCISES: CPT

## 2018-05-23 RX ORDER — SACCHAROMYCES BOULARDII 250 MG
250 CAPSULE ORAL 2 TIMES DAILY
Qty: 28 CAPSULE | Refills: 0 | Status: SHIPPED | OUTPATIENT
Start: 2018-05-23

## 2018-05-23 RX ORDER — MEGESTROL ACETATE 40 MG/ML
800 SUSPENSION ORAL DAILY
Qty: 240 ML | Refills: 0 | Status: SHIPPED | OUTPATIENT
Start: 2018-05-24 | End: 2018-06-12 | Stop reason: HOSPADM

## 2018-05-23 RX ORDER — AMLODIPINE BESYLATE 5 MG/1
5 TABLET ORAL
Qty: 30 TABLET | Refills: 0 | Status: SHIPPED | OUTPATIENT
Start: 2018-05-24

## 2018-05-23 RX ORDER — ATORVASTATIN CALCIUM 10 MG/1
10 TABLET, FILM COATED ORAL NIGHTLY
Qty: 30 TABLET | Refills: 0 | Status: SHIPPED | OUTPATIENT
Start: 2018-05-23 | End: 2018-05-23 | Stop reason: HOSPADM

## 2018-05-23 RX ORDER — DIAZEPAM 2 MG/1
2 TABLET ORAL EVERY 12 HOURS PRN
Qty: 6 TABLET | Refills: 0 | Status: SHIPPED | OUTPATIENT
Start: 2018-05-23 | End: 2018-06-12 | Stop reason: HOSPADM

## 2018-05-23 RX ADMIN — VANCOMYCIN 250 MG: KIT at 06:11

## 2018-05-23 RX ADMIN — MEGESTROL ACETATE 800 MG: 40 SUSPENSION ORAL at 10:08

## 2018-05-23 RX ADMIN — INSULIN LISPRO 2 UNITS: 100 INJECTION, SOLUTION INTRAVENOUS; SUBCUTANEOUS at 13:24

## 2018-05-23 RX ADMIN — AMLODIPINE BESYLATE 5 MG: 5 TABLET ORAL at 10:09

## 2018-05-23 RX ADMIN — ESCITSLOPRAM 20 MG: 10 TABLET ORAL at 10:09

## 2018-05-23 RX ADMIN — VANCOMYCIN 250 MG: KIT at 13:24

## 2018-05-23 RX ADMIN — FAMOTIDINE 20 MG: 20 TABLET, FILM COATED ORAL at 10:09

## 2018-05-23 RX ADMIN — Medication 250 MG: at 10:08

## 2018-05-23 RX ADMIN — VANCOMYCIN 250 MG: KIT at 00:39

## 2018-05-23 RX ADMIN — MEMANTINE HYDROCHLORIDE 10 MG: 5 TABLET, FILM COATED ORAL at 10:09

## 2018-06-01 PROCEDURE — 93227 XTRNL ECG REC<48 HR R&I: CPT | Performed by: INTERNAL MEDICINE

## 2018-06-04 ENCOUNTER — HOSPITAL ENCOUNTER (INPATIENT)
Facility: HOSPITAL | Age: 73
LOS: 8 days | Discharge: HOSPICE | End: 2018-06-12
Attending: EMERGENCY MEDICINE | Admitting: FAMILY MEDICINE

## 2018-06-04 ENCOUNTER — APPOINTMENT (OUTPATIENT)
Dept: CT IMAGING | Facility: HOSPITAL | Age: 73
End: 2018-06-04

## 2018-06-04 ENCOUNTER — APPOINTMENT (OUTPATIENT)
Dept: GENERAL RADIOLOGY | Facility: HOSPITAL | Age: 73
End: 2018-06-04

## 2018-06-04 DIAGNOSIS — J18.9 PNEUMONIA DUE TO INFECTIOUS ORGANISM, UNSPECIFIED LATERALITY, UNSPECIFIED PART OF LUNG: ICD-10-CM

## 2018-06-04 DIAGNOSIS — R41.82 ALTERED MENTAL STATUS, UNSPECIFIED ALTERED MENTAL STATUS TYPE: ICD-10-CM

## 2018-06-04 DIAGNOSIS — A49.8 CLOSTRIDIUM DIFFICILE INFECTION: ICD-10-CM

## 2018-06-04 DIAGNOSIS — Z78.9 DECREASED ACTIVITIES OF DAILY LIVING (ADL): ICD-10-CM

## 2018-06-04 DIAGNOSIS — N39.0 URINARY TRACT INFECTION WITHOUT HEMATURIA, SITE UNSPECIFIED: Primary | ICD-10-CM

## 2018-06-04 DIAGNOSIS — R13.11 ORAL PHASE DYSPHAGIA: ICD-10-CM

## 2018-06-04 DIAGNOSIS — R41.89 IMPAIRED COGNITION: ICD-10-CM

## 2018-06-04 DIAGNOSIS — Z74.09 IMPAIRED MOBILITY AND ENDURANCE: ICD-10-CM

## 2018-06-04 LAB
ALBUMIN SERPL-MCNC: 3.7 G/DL (ref 3.5–5)
ALBUMIN/GLOB SERPL: 0.9 G/DL (ref 1.1–2.5)
ALP SERPL-CCNC: 223 U/L (ref 24–120)
ALT SERPL W P-5'-P-CCNC: 23 U/L (ref 0–54)
ANION GAP SERPL CALCULATED.3IONS-SCNC: 14 MMOL/L (ref 4–13)
ANISOCYTOSIS BLD QL: ABNORMAL
APTT PPP: 37.7 SECONDS (ref 24.1–34.8)
AST SERPL-CCNC: 182 U/L (ref 7–45)
BACTERIA UR QL AUTO: ABNORMAL /HPF
BILIRUB SERPL-MCNC: 0.9 MG/DL (ref 0.1–1)
BILIRUB UR QL STRIP: NEGATIVE
BUN BLD-MCNC: 32 MG/DL (ref 5–21)
BUN/CREAT SERPL: 19.8 (ref 7–25)
CALCIUM SPEC-SCNC: 10.5 MG/DL (ref 8.4–10.4)
CHLORIDE SERPL-SCNC: 104 MMOL/L (ref 98–110)
CLARITY UR: ABNORMAL
CLUMPED PLATELETS: PRESENT
CO2 SERPL-SCNC: 23 MMOL/L (ref 24–31)
COLOR UR: YELLOW
CREAT BLD-MCNC: 1.62 MG/DL (ref 0.5–1.4)
D-LACTATE SERPL-SCNC: 1.2 MMOL/L (ref 0.5–2)
DEPRECATED RDW RBC AUTO: 53.3 FL (ref 40–54)
EOSINOPHIL # BLD MANUAL: 0.3 10*3/MM3 (ref 0–0.7)
EOSINOPHIL NFR BLD MANUAL: 1 % (ref 0–4)
ERYTHROCYTE [DISTWIDTH] IN BLOOD BY AUTOMATED COUNT: 16.8 % (ref 12–15)
GFR SERPL CREATININE-BSD FRML MDRD: 31 ML/MIN/1.73
GIANT PLATELETS: ABNORMAL
GLOBULIN UR ELPH-MCNC: 4.3 GM/DL
GLUCOSE BLD-MCNC: 170 MG/DL (ref 70–100)
GLUCOSE UR STRIP-MCNC: NEGATIVE MG/DL
HCT VFR BLD AUTO: 41.2 % (ref 37–47)
HGB BLD-MCNC: 13 G/DL (ref 12–16)
HGB UR QL STRIP.AUTO: ABNORMAL
HOLD SPECIMEN: NORMAL
HOLD SPECIMEN: NORMAL
HYALINE CASTS UR QL AUTO: ABNORMAL /LPF
INR PPP: 1.09 (ref 0.91–1.09)
KETONES UR QL STRIP: NEGATIVE
LEUKOCYTE ESTERASE UR QL STRIP.AUTO: ABNORMAL
LYMPHOCYTES # BLD MANUAL: 13.11 10*3/MM3 (ref 0.72–4.86)
LYMPHOCYTES NFR BLD MANUAL: 4 % (ref 4–12)
LYMPHOCYTES NFR BLD MANUAL: 44 % (ref 15–45)
MACROCYTES BLD QL SMEAR: ABNORMAL
MAGNESIUM SERPL-MCNC: 2.2 MG/DL (ref 1.4–2.2)
MCH RBC QN AUTO: 28 PG (ref 28–32)
MCHC RBC AUTO-ENTMCNC: 31.6 G/DL (ref 33–36)
MCV RBC AUTO: 88.6 FL (ref 82–98)
MONOCYTES # BLD AUTO: 1.19 10*3/MM3 (ref 0.19–1.3)
NEUTROPHILS # BLD AUTO: 9.54 10*3/MM3 (ref 1.87–8.4)
NEUTROPHILS NFR BLD MANUAL: 31 % (ref 39–78)
NEUTS BAND NFR BLD MANUAL: 1 % (ref 0–10)
NITRITE UR QL STRIP: POSITIVE
PH UR STRIP.AUTO: <=5 [PH] (ref 5–8)
PLATELET # BLD AUTO: 659 10*3/MM3 (ref 130–400)
PMV BLD AUTO: 11.8 FL (ref 6–12)
POTASSIUM BLD-SCNC: 4.2 MMOL/L (ref 3.5–5.3)
PROT SERPL-MCNC: 8 G/DL (ref 6.3–8.7)
PROT UR QL STRIP: ABNORMAL
PROTHROMBIN TIME: 14.5 SECONDS (ref 11.9–14.6)
RBC # BLD AUTO: 4.65 10*6/MM3 (ref 4.2–5.4)
RBC # UR: ABNORMAL /HPF
REF LAB TEST METHOD: ABNORMAL
SMALL PLATELETS BLD QL SMEAR: ABNORMAL
SMUDGE CELLS BLD QL SMEAR: ABNORMAL
SODIUM BLD-SCNC: 141 MMOL/L (ref 135–145)
SP GR UR STRIP: 1.02 (ref 1–1.03)
SQUAMOUS #/AREA URNS HPF: ABNORMAL /HPF
TARGETS BLD QL SMEAR: ABNORMAL
TSH SERPL DL<=0.05 MIU/L-ACNC: 1.21 MIU/ML (ref 0.47–4.68)
UROBILINOGEN UR QL STRIP: ABNORMAL
VARIANT LYMPHS NFR BLD MANUAL: 19 % (ref 0–5)
WBC NRBC COR # BLD: 29.8 10*3/MM3 (ref 4.8–10.8)
WBC UR QL AUTO: ABNORMAL /HPF
WHOLE BLOOD HOLD SPECIMEN: NORMAL
WHOLE BLOOD HOLD SPECIMEN: NORMAL

## 2018-06-04 PROCEDURE — 99285 EMERGENCY DEPT VISIT HI MDM: CPT

## 2018-06-04 PROCEDURE — 87086 URINE CULTURE/COLONY COUNT: CPT | Performed by: EMERGENCY MEDICINE

## 2018-06-04 PROCEDURE — 85610 PROTHROMBIN TIME: CPT | Performed by: EMERGENCY MEDICINE

## 2018-06-04 PROCEDURE — 84443 ASSAY THYROID STIM HORMONE: CPT | Performed by: EMERGENCY MEDICINE

## 2018-06-04 PROCEDURE — 80053 COMPREHEN METABOLIC PANEL: CPT | Performed by: EMERGENCY MEDICINE

## 2018-06-04 PROCEDURE — 85730 THROMBOPLASTIN TIME PARTIAL: CPT | Performed by: EMERGENCY MEDICINE

## 2018-06-04 PROCEDURE — 83605 ASSAY OF LACTIC ACID: CPT | Performed by: EMERGENCY MEDICINE

## 2018-06-04 PROCEDURE — 87150 DNA/RNA AMPLIFIED PROBE: CPT | Performed by: EMERGENCY MEDICINE

## 2018-06-04 PROCEDURE — 83735 ASSAY OF MAGNESIUM: CPT | Performed by: EMERGENCY MEDICINE

## 2018-06-04 PROCEDURE — 87186 SC STD MICRODIL/AGAR DIL: CPT | Performed by: EMERGENCY MEDICINE

## 2018-06-04 PROCEDURE — P9612 CATHETERIZE FOR URINE SPEC: HCPCS

## 2018-06-04 PROCEDURE — 70450 CT HEAD/BRAIN W/O DYE: CPT

## 2018-06-04 PROCEDURE — 25010000002 VANCOMYCIN PER 500 MG: Performed by: EMERGENCY MEDICINE

## 2018-06-04 PROCEDURE — 87077 CULTURE AEROBIC IDENTIFY: CPT | Performed by: EMERGENCY MEDICINE

## 2018-06-04 PROCEDURE — 87147 CULTURE TYPE IMMUNOLOGIC: CPT | Performed by: EMERGENCY MEDICINE

## 2018-06-04 PROCEDURE — 81001 URINALYSIS AUTO W/SCOPE: CPT | Performed by: EMERGENCY MEDICINE

## 2018-06-04 PROCEDURE — 71045 X-RAY EXAM CHEST 1 VIEW: CPT

## 2018-06-04 PROCEDURE — 87040 BLOOD CULTURE FOR BACTERIA: CPT | Performed by: EMERGENCY MEDICINE

## 2018-06-04 PROCEDURE — 85025 COMPLETE CBC W/AUTO DIFF WBC: CPT | Performed by: EMERGENCY MEDICINE

## 2018-06-04 PROCEDURE — 25010000002 PIPERACILLIN SOD-TAZOBACTAM PER 1 G: Performed by: EMERGENCY MEDICINE

## 2018-06-04 PROCEDURE — 85007 BL SMEAR W/DIFF WBC COUNT: CPT | Performed by: EMERGENCY MEDICINE

## 2018-06-04 RX ORDER — SODIUM CHLORIDE 0.9 % (FLUSH) 0.9 %
10 SYRINGE (ML) INJECTION AS NEEDED
Status: DISCONTINUED | OUTPATIENT
Start: 2018-06-04 | End: 2018-06-12 | Stop reason: HOSPADM

## 2018-06-04 RX ORDER — TRAMADOL HYDROCHLORIDE 50 MG/1
50 TABLET ORAL EVERY 8 HOURS PRN
COMMUNITY
End: 2018-06-12 | Stop reason: HOSPADM

## 2018-06-04 RX ORDER — ACETAMINOPHEN 325 MG/1
650 TABLET ORAL EVERY 4 HOURS PRN
COMMUNITY

## 2018-06-04 RX ORDER — LOSARTAN POTASSIUM 50 MG/1
100 TABLET ORAL DAILY
COMMUNITY

## 2018-06-04 RX ADMIN — VANCOMYCIN HYDROCHLORIDE 2000 MG: 1 INJECTION, POWDER, LYOPHILIZED, FOR SOLUTION INTRAVENOUS at 23:02

## 2018-06-04 RX ADMIN — METRONIDAZOLE 500 MG: 500 SOLUTION INTRAVENOUS at 21:52

## 2018-06-04 RX ADMIN — SODIUM CHLORIDE 500 ML: 9 INJECTION, SOLUTION INTRAVENOUS at 19:53

## 2018-06-04 RX ADMIN — TAZOBACTAM SODIUM AND PIPERACILLIN SODIUM 4.5 G: 500; 4 INJECTION, SOLUTION INTRAVENOUS at 21:50

## 2018-06-04 NOTE — ED PROVIDER NOTES
Subjective   Patient is a 73-year-old female who presents with altered mental status.  Daughter provides history.  She states the patient has had problems with hypercalcemia and the past and symptoms have been similar.  She does live in a rehabilitation facility.  Daughter notes she was recently diagnosed with some type of liver cancer.  She notes over the past several days she has been more confused, has had difficulty ambulating.  She was also diagnosed with C. difficile last Wednesday and has been on oral vancomycin.  She denies any fevers.  She continues to have diarrhea according to the daughter.    Discharge summary from May 23 shows patient was admitted for weakness and found to have pneumonia and UTI.  She was bradycardic in the 30s upon admission.  She had worsening dementia and neurology was consult.  I felt this is secondary to her infection.  Beta blocker was held.  Patient did develop C. difficile here in the hospital.  She had hypercalcemia and was given IV fluids.  A liver lesion was found and biopsy which was malignant with cholangiocarcinoma.        History limited by:  Mental status change      Review of Systems   Unable to perform ROS: Mental status change       Past Medical History:   Diagnosis Date   • Cancer     liver   • Dementia    • Diabetes    • Dysphagia    • High cholesterol    • Hypertension    • Laryngopharyngeal reflux    • Lingual tonsil hypertrophy    • Thyroid nodule        Allergies   Allergen Reactions   • Cephalexin Shortness Of Breath   • Oxycodone Other (See Comments)     Caused PT to jerk uncontrollably.     • Sulfa Antibiotics Shortness Of Breath   • Sulfamethoxazole-Trimethoprim Shortness Of Breath       Past Surgical History:   Procedure Laterality Date   • APPENDECTOMY     • CHOLECYSTECTOMY     • FINE NEEDLE ASPIRATION     • KNEE SURGERY     • SPLENECTOMY     • TONSILLECTOMY         Family History   Problem Relation Age of Onset   • Cancer Mother    • Heart disease Mother         Social History     Social History   • Marital status:      Social History Main Topics   • Smoking status: Former Smoker     Quit date: 2012   • Smokeless tobacco: Never Used      Comment: 2012   • Alcohol use No   • Drug use: Unknown   • Sexual activity: Defer     Other Topics Concern   • Not on file       Lab Results (last 24 hours)     Procedure Component Value Units Date/Time    TSH [[id number]]  (Normal) Collected:  06/04/18 1856    Specimen:  Blood Updated:  06/04/18 1950     TSH 1.210 mIU/mL     Magnesium [[id number]]  (Normal) Collected:  06/04/18 1856    Specimen:  Blood Updated:  06/04/18 1922     Magnesium 2.2 mg/dL      Comment: Specimen hemolyzed.  Results may be affected.       Protime-INR [[id number]]  (Normal) Collected:  06/04/18 1856    Specimen:  Blood Updated:  06/04/18 1924     Protime 14.5 Seconds      INR 1.09    aPTT [[id number]]  (Abnormal) Collected:  06/04/18 1856    Specimen:  Blood Updated:  06/04/18 1924     PTT 37.7 (H) seconds     Comprehensive Metabolic Panel [[id number]]  (Abnormal) Collected:  06/04/18 1856    Specimen:  Blood Updated:  06/04/18 2009     Glucose 170 (H) mg/dL      BUN 32 (H) mg/dL      Comment: Specimen hemolyzed. Results may be affected.        Creatinine 1.62 (H) mg/dL      Sodium 141 mmol/L      Potassium 4.2 mmol/L      Comment: Specimen hemolyzed.  Results may be affected.        Chloride 104 mmol/L      CO2 23.0 (L) mmol/L      Calcium 10.5 (H) mg/dL      Total Protein 8.0 g/dL      Albumin 3.70 g/dL      ALT (SGPT) 23 U/L      Comment: Specimen hemolyzed.  Results may be affected.        AST (SGOT) 182 (H) U/L      Comment: Specimen hemolyzed.  Results may be affected.        Alkaline Phosphatase 223 (H) U/L      Comment: Specimen hemolyzed. Results may be affected.        Total Bilirubin 0.9 mg/dL      eGFR Non African Amer 31 (L) mL/min/1.73      Globulin 4.3 gm/dL      A/G Ratio 0.9 (L) g/dL      BUN/Creatinine Ratio 19.8     Anion Gap 14.0 (H)  mmol/L     Narrative:       The MDRD GFR formula is only valid for adults with stable renal function between ages 18 and 70.    CBC Auto Differential [[id number]]  (Abnormal) Collected:  06/04/18 1856    Specimen:  Blood Updated:  06/04/18 2027     WBC 29.80 (H) 10*3/mm3      RBC 4.65 10*6/mm3      Hemoglobin 13.0 g/dL      Hematocrit 41.2 %      MCV 88.6 fL      MCH 28.0 pg      MCHC 31.6 (L) g/dL      RDW 16.8 (H) %      RDW-SD 53.3 fl      MPV 11.8 fL      Platelets 659 (H) 10*3/mm3     Manual Differential [[id number]]  (Abnormal) Collected:  06/04/18 1856    Specimen:  Blood Updated:  06/04/18 2027     Neutrophil % 31.0 (L) %      Lymphocyte % 44.0 %      Monocyte % 4.0 %      Eosinophil % 1.0 %      Bands %  1.0 %      Atypical Lymphocyte % 19.0 (H) %      Neutrophils Absolute 9.54 (H) 10*3/mm3      Lymphocytes Absolute 13.11 (H) 10*3/mm3      Monocytes Absolute 1.19 10*3/mm3      Eosinophils Absolute 0.30 10*3/mm3      Anisocytosis Slight/1+     Macrocytes Slight/1+     Target Cells Slight/1+     Smudge Cells Slight/1+     Platelet Estimate Increased     Clumped Platelets Present     Giant Platelets Slight/1+    Urinalysis With / Culture If Indicated - Urine, Catheter [[id number]]  (Abnormal) Collected:  06/04/18 2028    Specimen:  Urine from Urine, Catheter Updated:  06/04/18 2043     Color, UA Yellow     Appearance, UA Cloudy (A)     pH, UA <=5.0     Specific Gravity, UA 1.016     Glucose, UA Negative     Ketones, UA Negative     Bilirubin, UA Negative     Blood, UA Trace (A)     Protein,  mg/dL (2+) (A)     Leuk Esterase, UA Moderate (2+) (A)     Nitrite, UA Positive (A)     Urobilinogen, UA 0.2 E.U./dL    Urinalysis, Microscopic Only - Urine, Clean Catch [[id number]]  (Abnormal) Collected:  06/04/18 2028    Specimen:  Urine from Urine, Catheter Updated:  06/04/18 2043     RBC, UA 0-2 (A) /HPF      WBC, UA Too Numerous to Count (A) /HPF      Bacteria, UA 4+ (A) /HPF      Squamous Epithelial Cells, UA 0-2  /HPF      Hyaline Casts, UA None Seen /LPF      Methodology Automated Microscopy    Urine Culture - Urine, [[id number]] Collected:  06/04/18 2028    Specimen:  Urine from Urine, Catheter Updated:  06/04/18 2043    Blood Culture - Blood, Blood, Venous Line [[id number]] Collected:  06/04/18 2145    Specimen:  Blood from Hand, Left Updated:  06/04/18 2203    Blood Culture - Blood, Blood, Venous Line [[id number]] Collected:  06/04/18 2147    Specimen:  Blood from Arm, Right Updated:  06/04/18 2203          Objective   Physical Exam   Constitutional: Vital signs are normal. She appears well-nourished.  Non-toxic appearance. She does not have a sickly appearance. She does not appear ill. No distress.   HENT:   Head: Atraumatic.   Mouth/Throat: Mucous membranes are normal.   Eyes: EOM are normal. Pupils are equal, round, and reactive to light.   Neck: Normal range of motion. Neck supple.   Cardiovascular: Normal rate, regular rhythm and normal heart sounds.  Exam reveals no gallop and no friction rub.    No murmur heard.  Pulmonary/Chest: Effort normal and breath sounds normal. No tachypnea. No respiratory distress. She has no wheezes. She has no rhonchi. She has no rales.   Abdominal: Soft. There is no tenderness.   Musculoskeletal: She exhibits no edema.   Neurological: She is alert. She has normal strength. She is disoriented. No cranial nerve deficit or sensory deficit. GCS eye subscore is 4. GCS verbal subscore is 5. GCS motor subscore is 6.   Unable to tell me place or time   Skin: Skin is warm and dry. Capillary refill takes less than 2 seconds. No rash noted.   Nursing note and vitals reviewed.      Procedures         CT Head Without Contrast   Final Result   Moderate cerebral and cerebellar volume loss with chronic microvascular   disease but no evidence of acute intracranial process.           This report was finalized on 06/04/2018 22:01 by Dr. Son Guerra MD.      XR Chest 1 View   Final Result   1.  "Expiratory chest.   2. Suspected patchy infiltrate within the periphery of the left upper   lobe.   This report was finalized on 06/04/2018 19:16 by Dr. Son Guerra MD.          /52   Pulse 70   Temp 97.6 °F (36.4 °C) (Tympanic)   Resp 15   Ht 167.6 cm (66\")   Wt 95.7 kg (211 lb)   SpO2 93%   BMI 34.06 kg/m²     ED Course    ED Course as of Jun 04 2206 Mon Jun 04, 2018 2035 This is a 73-year-old female who presents with altered mental status.  She is not oriented.  Vitals normal.  Recent pneumonia and on oral vancomycin for C. difficile.  Her white count is 29,000 likely consistent with C. difficile.  She does have acute kidney injury with creatinine 1.62.  Her calcium is 10.5.  Chest x-ray does show likely right sided upper lobe pneumonia.  We will give ceftriaxone.  UA and head CT pending.  She will likely require admission back to the hospital.  [TH]   2047 Nitrite, UA: (!) Positive [TH]   2047 Leuk Esterase, UA: (!) Moderate (2+) [TH]   2047 Bacteria, UA: (!) 4+ [TH]   2047 WBC, UA: (!) Too Numerous to Count [TH]   2108 I spoke to the hospitalist.  We will admit given positive urine studies and continued pneumonia.  We will cover for healthcare associated pneumonia with vancomycin and Zosyn.  We will also add on IV Flagyl given Clostridium difficile infection.  [TH]   2206 Head CT negative for acute change.  [TH]      ED Course User Index  [TH] Cali Kaminski MD       Medications   sodium chloride 0.9 % flush 10 mL (not administered)   vancomycin (VANCOCIN) 2,000 mg in sodium chloride 0.9 % 500 mL IVPB (not administered)   metroNIDAZOLE (FLAGYL) IVPB 500 mg (500 mg Intravenous New Bag 6/4/18 2152)   piperacillin-tazobactam (ZOSYN) 4.5 g in iso-osmotic dextrose 100 mL IVPB (premix) (4.5 g Intravenous New Bag 6/4/18 2150)   sodium chloride 0.9 % bolus 500 mL (0 mL Intravenous Stopped 6/4/18 2053)            MDM  Number of Diagnoses or Management Options  Altered mental status, " unspecified altered mental status type: new and requires workup  Clostridium difficile infection: established and improving  Pneumonia due to infectious organism, unspecified laterality, unspecified part of lung: new and requires workup  Urinary tract infection without hematuria, site unspecified: new and requires workup     Amount and/or Complexity of Data Reviewed  Clinical lab tests: reviewed  Tests in the radiology section of CPT®: reviewed  Decide to obtain previous medical records or to obtain history from someone other than the patient: yes    Risk of Complications, Morbidity, and/or Mortality  Presenting problems: moderate  Diagnostic procedures: moderate  Management options: moderate    Patient Progress  Patient progress: stable      Final diagnoses:   Urinary tract infection without hematuria, site unspecified   Pneumonia due to infectious organism, unspecified laterality, unspecified part of lung   Altered mental status, unspecified altered mental status type   Clostridium difficile infection          Cali Kaminski MD  06/04/18 9501

## 2018-06-05 ENCOUNTER — APPOINTMENT (OUTPATIENT)
Dept: ULTRASOUND IMAGING | Facility: HOSPITAL | Age: 73
End: 2018-06-05

## 2018-06-05 LAB
ALBUMIN SERPL-MCNC: 3.5 G/DL (ref 3.5–5)
ALBUMIN/GLOB SERPL: 0.9 G/DL (ref 1.1–2.5)
ALP SERPL-CCNC: 211 U/L (ref 24–120)
ALT SERPL W P-5'-P-CCNC: 26 U/L (ref 0–54)
AMMONIA BLD-SCNC: 40 UMOL/L (ref 9–33)
ANION GAP SERPL CALCULATED.3IONS-SCNC: 12 MMOL/L (ref 4–13)
ANISOCYTOSIS BLD QL: ABNORMAL
AST SERPL-CCNC: 147 U/L (ref 7–45)
BACTERIA BLD CULT: ABNORMAL
BILIRUB SERPL-MCNC: 0.7 MG/DL (ref 0.1–1)
BUN BLD-MCNC: 28 MG/DL (ref 5–21)
BUN/CREAT SERPL: 17.6 (ref 7–25)
CALCIUM SPEC-SCNC: 10.1 MG/DL (ref 8.4–10.4)
CHLORIDE SERPL-SCNC: 108 MMOL/L (ref 98–110)
CO2 SERPL-SCNC: 24 MMOL/L (ref 24–31)
CREAT BLD-MCNC: 1.59 MG/DL (ref 0.5–1.4)
CREAT UR-MCNC: 55.3 MG/DL
D-LACTATE SERPL-SCNC: 1.2 MMOL/L (ref 0.5–2)
DEPRECATED RDW RBC AUTO: 52.5 FL (ref 40–54)
ERYTHROCYTE [DISTWIDTH] IN BLOOD BY AUTOMATED COUNT: 16.7 % (ref 12–15)
GFR SERPL CREATININE-BSD FRML MDRD: 32 ML/MIN/1.73
GLOBULIN UR ELPH-MCNC: 3.8 GM/DL
GLUCOSE BLD-MCNC: 117 MG/DL (ref 70–100)
GLUCOSE BLDC GLUCOMTR-MCNC: 129 MG/DL (ref 70–130)
GLUCOSE BLDC GLUCOMTR-MCNC: 131 MG/DL (ref 70–130)
GLUCOSE BLDC GLUCOMTR-MCNC: 160 MG/DL (ref 70–130)
GLUCOSE BLDC GLUCOMTR-MCNC: 91 MG/DL (ref 70–130)
HAV IGM SERPL QL IA: NEGATIVE
HBV CORE IGM SERPL QL IA: NEGATIVE
HBV SURFACE AG SERPL QL IA: NEGATIVE
HCT VFR BLD AUTO: 38 % (ref 37–47)
HCV AB SER DONR QL: NEGATIVE
HCV S/C RATIO: 0.02 (ref 0–0.99)
HGB BLD-MCNC: 12.1 G/DL (ref 12–16)
L PNEUMO1 AG UR QL IA: NEGATIVE
LYMPHOCYTES # BLD MANUAL: 23.72 10*3/MM3 (ref 0.72–4.86)
LYMPHOCYTES NFR BLD MANUAL: 4 % (ref 4–12)
LYMPHOCYTES NFR BLD MANUAL: 50.5 % (ref 15–45)
MACROCYTES BLD QL SMEAR: ABNORMAL
MAGNESIUM SERPL-MCNC: 1.9 MG/DL (ref 1.4–2.2)
MCH RBC QN AUTO: 28.1 PG (ref 28–32)
MCHC RBC AUTO-ENTMCNC: 31.8 G/DL (ref 33–36)
MCV RBC AUTO: 88.4 FL (ref 82–98)
MONOCYTES # BLD AUTO: 1.88 10*3/MM3 (ref 0.19–1.3)
NEUTROPHILS # BLD AUTO: 8.55 10*3/MM3 (ref 1.87–8.4)
NEUTROPHILS NFR BLD MANUAL: 18.2 % (ref 39–78)
PHOSPHATE SERPL-MCNC: 3.1 MG/DL (ref 2.5–4.5)
PLATELET # BLD AUTO: 620 10*3/MM3 (ref 130–400)
PMV BLD AUTO: 11 FL (ref 6–12)
POIKILOCYTOSIS BLD QL SMEAR: ABNORMAL
POTASSIUM BLD-SCNC: 4 MMOL/L (ref 3.5–5.3)
PROT SERPL-MCNC: 7.3 G/DL (ref 6.3–8.7)
RBC # BLD AUTO: 4.3 10*6/MM3 (ref 4.2–5.4)
S PNEUM AG SPEC QL LA: NEGATIVE
SMALL PLATELETS BLD QL SMEAR: ABNORMAL
SMUDGE CELLS BLD QL SMEAR: ABNORMAL
SODIUM BLD-SCNC: 144 MMOL/L (ref 135–145)
SODIUM UR-SCNC: 105 MMOL/L (ref 30–90)
TARGETS BLD QL SMEAR: ABNORMAL
VARIANT LYMPHS NFR BLD MANUAL: 27.3 % (ref 0–5)
WBC NRBC COR # BLD: 46.97 10*3/MM3 (ref 4.8–10.8)

## 2018-06-05 PROCEDURE — 82570 ASSAY OF URINE CREATININE: CPT | Performed by: FAMILY MEDICINE

## 2018-06-05 PROCEDURE — 83735 ASSAY OF MAGNESIUM: CPT | Performed by: FAMILY MEDICINE

## 2018-06-05 PROCEDURE — 87899 AGENT NOS ASSAY W/OPTIC: CPT | Performed by: NURSE PRACTITIONER

## 2018-06-05 PROCEDURE — 76700 US EXAM ABDOM COMPLETE: CPT

## 2018-06-05 PROCEDURE — 84300 ASSAY OF URINE SODIUM: CPT | Performed by: FAMILY MEDICINE

## 2018-06-05 PROCEDURE — 85025 COMPLETE CBC W/AUTO DIFF WBC: CPT | Performed by: FAMILY MEDICINE

## 2018-06-05 PROCEDURE — G8987 SELF CARE CURRENT STATUS: HCPCS

## 2018-06-05 PROCEDURE — 82140 ASSAY OF AMMONIA: CPT | Performed by: NURSE PRACTITIONER

## 2018-06-05 PROCEDURE — 97166 OT EVAL MOD COMPLEX 45 MIN: CPT

## 2018-06-05 PROCEDURE — 94760 N-INVAS EAR/PLS OXIMETRY 1: CPT

## 2018-06-05 PROCEDURE — 82962 GLUCOSE BLOOD TEST: CPT

## 2018-06-05 PROCEDURE — 92610 EVALUATE SWALLOWING FUNCTION: CPT

## 2018-06-05 PROCEDURE — 25010000002 HEPARIN (PORCINE) PER 1000 UNITS: Performed by: FAMILY MEDICINE

## 2018-06-05 PROCEDURE — 84100 ASSAY OF PHOSPHORUS: CPT | Performed by: FAMILY MEDICINE

## 2018-06-05 PROCEDURE — 93010 ELECTROCARDIOGRAM REPORT: CPT | Performed by: INTERNAL MEDICINE

## 2018-06-05 PROCEDURE — 94640 AIRWAY INHALATION TREATMENT: CPT

## 2018-06-05 PROCEDURE — 87205 SMEAR GRAM STAIN: CPT | Performed by: FAMILY MEDICINE

## 2018-06-05 PROCEDURE — 80053 COMPREHEN METABOLIC PANEL: CPT | Performed by: FAMILY MEDICINE

## 2018-06-05 PROCEDURE — 80074 ACUTE HEPATITIS PANEL: CPT | Performed by: FAMILY MEDICINE

## 2018-06-05 PROCEDURE — 25010000002 VANCOMYCIN PER 500 MG: Performed by: FAMILY MEDICINE

## 2018-06-05 PROCEDURE — 97535 SELF CARE MNGMENT TRAINING: CPT

## 2018-06-05 PROCEDURE — 83605 ASSAY OF LACTIC ACID: CPT | Performed by: FAMILY MEDICINE

## 2018-06-05 PROCEDURE — 94799 UNLISTED PULMONARY SVC/PX: CPT

## 2018-06-05 PROCEDURE — 63710000001 INSULIN LISPRO (HUMAN) PER 5 UNITS: Performed by: NURSE PRACTITIONER

## 2018-06-05 PROCEDURE — G8996 SWALLOW CURRENT STATUS: HCPCS

## 2018-06-05 PROCEDURE — G8988 SELF CARE GOAL STATUS: HCPCS

## 2018-06-05 PROCEDURE — G8997 SWALLOW GOAL STATUS: HCPCS

## 2018-06-05 PROCEDURE — 85007 BL SMEAR W/DIFF WBC COUNT: CPT | Performed by: FAMILY MEDICINE

## 2018-06-05 PROCEDURE — 93005 ELECTROCARDIOGRAM TRACING: CPT | Performed by: FAMILY MEDICINE

## 2018-06-05 RX ORDER — TRAMADOL HYDROCHLORIDE 50 MG/1
25 TABLET ORAL ONCE
Status: COMPLETED | OUTPATIENT
Start: 2018-06-05 | End: 2018-06-05

## 2018-06-05 RX ORDER — MEGESTROL ACETATE 40 MG/ML
800 SUSPENSION ORAL DAILY
Status: DISCONTINUED | OUTPATIENT
Start: 2018-06-05 | End: 2018-06-10

## 2018-06-05 RX ORDER — IPRATROPIUM BROMIDE AND ALBUTEROL SULFATE 2.5; .5 MG/3ML; MG/3ML
3 SOLUTION RESPIRATORY (INHALATION)
Status: DISCONTINUED | OUTPATIENT
Start: 2018-06-05 | End: 2018-06-06

## 2018-06-05 RX ORDER — ESCITALOPRAM OXALATE 10 MG/1
20 TABLET ORAL DAILY
Status: DISCONTINUED | OUTPATIENT
Start: 2018-06-05 | End: 2018-06-12 | Stop reason: HOSPADM

## 2018-06-05 RX ORDER — PANTOPRAZOLE SODIUM 40 MG/1
40 TABLET, DELAYED RELEASE ORAL
Status: DISCONTINUED | OUTPATIENT
Start: 2018-06-05 | End: 2018-06-05

## 2018-06-05 RX ORDER — FAMOTIDINE 20 MG/1
40 TABLET, FILM COATED ORAL DAILY
Status: DISCONTINUED | OUTPATIENT
Start: 2018-06-05 | End: 2018-06-05 | Stop reason: DRUGHIGH

## 2018-06-05 RX ORDER — VANCOMYCIN HYDROCHLORIDE 1 G/200ML
1000 INJECTION, SOLUTION INTRAVENOUS EVERY 24 HOURS
Status: DISCONTINUED | OUTPATIENT
Start: 2018-06-05 | End: 2018-06-06

## 2018-06-05 RX ORDER — FAMOTIDINE 20 MG/1
20 TABLET, FILM COATED ORAL DAILY
Status: DISCONTINUED | OUTPATIENT
Start: 2018-06-05 | End: 2018-06-12 | Stop reason: HOSPADM

## 2018-06-05 RX ORDER — MEMANTINE HYDROCHLORIDE 5 MG/1
10 TABLET ORAL EVERY 12 HOURS SCHEDULED
Status: DISCONTINUED | OUTPATIENT
Start: 2018-06-05 | End: 2018-06-12 | Stop reason: HOSPADM

## 2018-06-05 RX ORDER — SACCHAROMYCES BOULARDII 250 MG
250 CAPSULE ORAL 2 TIMES DAILY
Status: DISCONTINUED | OUTPATIENT
Start: 2018-06-05 | End: 2018-06-06

## 2018-06-05 RX ORDER — IPRATROPIUM BROMIDE AND ALBUTEROL SULFATE 2.5; .5 MG/3ML; MG/3ML
3 SOLUTION RESPIRATORY (INHALATION)
Status: DISCONTINUED | OUTPATIENT
Start: 2018-06-05 | End: 2018-06-05

## 2018-06-05 RX ORDER — SODIUM CHLORIDE 9 MG/ML
50 INJECTION, SOLUTION INTRAVENOUS CONTINUOUS
Status: DISCONTINUED | OUTPATIENT
Start: 2018-06-05 | End: 2018-06-07

## 2018-06-05 RX ORDER — SODIUM CHLORIDE 0.9 % (FLUSH) 0.9 %
1-10 SYRINGE (ML) INJECTION AS NEEDED
Status: DISCONTINUED | OUTPATIENT
Start: 2018-06-05 | End: 2018-06-12 | Stop reason: HOSPADM

## 2018-06-05 RX ORDER — AMLODIPINE BESYLATE 5 MG/1
5 TABLET ORAL
Status: DISCONTINUED | OUTPATIENT
Start: 2018-06-05 | End: 2018-06-12 | Stop reason: HOSPADM

## 2018-06-05 RX ORDER — ONDANSETRON 2 MG/ML
4 INJECTION INTRAMUSCULAR; INTRAVENOUS EVERY 6 HOURS PRN
Status: DISCONTINUED | OUTPATIENT
Start: 2018-06-05 | End: 2018-06-12 | Stop reason: HOSPADM

## 2018-06-05 RX ORDER — HEPARIN SODIUM 5000 [USP'U]/ML
5000 INJECTION, SOLUTION INTRAVENOUS; SUBCUTANEOUS EVERY 12 HOURS SCHEDULED
Status: DISCONTINUED | OUTPATIENT
Start: 2018-06-05 | End: 2018-06-12 | Stop reason: HOSPADM

## 2018-06-05 RX ORDER — DONEPEZIL HYDROCHLORIDE 10 MG/1
10 TABLET, FILM COATED ORAL NIGHTLY
Status: DISCONTINUED | OUTPATIENT
Start: 2018-06-05 | End: 2018-06-12 | Stop reason: HOSPADM

## 2018-06-05 RX ORDER — ATORVASTATIN CALCIUM 10 MG/1
20 TABLET, FILM COATED ORAL DAILY
Status: DISCONTINUED | OUTPATIENT
Start: 2018-06-05 | End: 2018-06-12 | Stop reason: HOSPADM

## 2018-06-05 RX ORDER — HEPARIN SODIUM 5000 [USP'U]/ML
5000 INJECTION, SOLUTION INTRAVENOUS; SUBCUTANEOUS EVERY 12 HOURS SCHEDULED
Status: DISCONTINUED | OUTPATIENT
Start: 2018-06-05 | End: 2018-06-05 | Stop reason: SDUPTHER

## 2018-06-05 RX ORDER — HYDROCHLOROTHIAZIDE 25 MG/1
12.5 TABLET ORAL DAILY
Status: DISCONTINUED | OUTPATIENT
Start: 2018-06-05 | End: 2018-06-05

## 2018-06-05 RX ADMIN — VANCOMYCIN HYDROCHLORIDE 1000 MG: 1 INJECTION, SOLUTION INTRAVENOUS at 22:44

## 2018-06-05 RX ADMIN — HYDROCHLOROTHIAZIDE 12.5 MG: 25 TABLET ORAL at 10:01

## 2018-06-05 RX ADMIN — PANTOPRAZOLE SODIUM 40 MG: 40 TABLET, DELAYED RELEASE ORAL at 05:56

## 2018-06-05 RX ADMIN — ESCITALOPRAM 20 MG: 10 TABLET, FILM COATED ORAL at 10:04

## 2018-06-05 RX ADMIN — MEGESTROL ACETATE 800 MG: 40 SUSPENSION ORAL at 10:04

## 2018-06-05 RX ADMIN — SODIUM CHLORIDE 100 ML/HR: 9 INJECTION, SOLUTION INTRAVENOUS at 18:17

## 2018-06-05 RX ADMIN — IPRATROPIUM BROMIDE AND ALBUTEROL SULFATE 3 ML: 2.5; .5 SOLUTION RESPIRATORY (INHALATION) at 03:21

## 2018-06-05 RX ADMIN — Medication 250 MG: at 10:04

## 2018-06-05 RX ADMIN — SODIUM CHLORIDE 100 ML/HR: 9 INJECTION, SOLUTION INTRAVENOUS at 02:55

## 2018-06-05 RX ADMIN — AMLODIPINE BESYLATE 5 MG: 5 TABLET ORAL at 10:04

## 2018-06-05 RX ADMIN — TRAMADOL HYDROCHLORIDE 25 MG: 50 TABLET, COATED ORAL at 02:55

## 2018-06-05 RX ADMIN — AZTREONAM 1000 MG: 1 INJECTION, POWDER, LYOPHILIZED, FOR SOLUTION INTRAMUSCULAR; INTRAVENOUS at 02:55

## 2018-06-05 RX ADMIN — MEMANTINE HYDROCHLORIDE 10 MG: 5 TABLET, FILM COATED ORAL at 21:20

## 2018-06-05 RX ADMIN — IPRATROPIUM BROMIDE AND ALBUTEROL SULFATE 3 ML: 2.5; .5 SOLUTION RESPIRATORY (INHALATION) at 16:07

## 2018-06-05 RX ADMIN — FAMOTIDINE 20 MG: 20 TABLET, FILM COATED ORAL at 10:04

## 2018-06-05 RX ADMIN — DONEPEZIL HYDROCHLORIDE 10 MG: 10 TABLET, FILM COATED ORAL at 21:20

## 2018-06-05 RX ADMIN — METRONIDAZOLE 500 MG: 500 SOLUTION INTRAVENOUS at 05:56

## 2018-06-05 RX ADMIN — Medication 250 MG: at 21:20

## 2018-06-05 RX ADMIN — HEPARIN SODIUM 5000 UNITS: 5000 INJECTION INTRAVENOUS; SUBCUTANEOUS at 05:56

## 2018-06-05 RX ADMIN — ATORVASTATIN CALCIUM 20 MG: 10 TABLET, FILM COATED ORAL at 10:04

## 2018-06-05 RX ADMIN — AZTREONAM 1000 MG: 1 INJECTION, POWDER, LYOPHILIZED, FOR SOLUTION INTRAMUSCULAR; INTRAVENOUS at 10:21

## 2018-06-05 RX ADMIN — IPRATROPIUM BROMIDE AND ALBUTEROL SULFATE 3 ML: 2.5; .5 SOLUTION RESPIRATORY (INHALATION) at 12:15

## 2018-06-05 RX ADMIN — HEPARIN SODIUM 5000 UNITS: 5000 INJECTION INTRAVENOUS; SUBCUTANEOUS at 18:12

## 2018-06-05 RX ADMIN — METRONIDAZOLE 500 MG: 500 SOLUTION INTRAVENOUS at 14:59

## 2018-06-05 RX ADMIN — MEMANTINE HYDROCHLORIDE 10 MG: 5 TABLET, FILM COATED ORAL at 10:03

## 2018-06-05 RX ADMIN — INSULIN LISPRO 2 UNITS: 100 INJECTION, SOLUTION INTRAVENOUS; SUBCUTANEOUS at 18:12

## 2018-06-05 RX ADMIN — VANCOMYCIN HYDROCHLORIDE 125 MG: KIT at 18:13

## 2018-06-05 RX ADMIN — AZTREONAM 1000 MG: 1 INJECTION, POWDER, LYOPHILIZED, FOR SOLUTION INTRAMUSCULAR; INTRAVENOUS at 17:50

## 2018-06-05 RX ADMIN — DONEPEZIL HYDROCHLORIDE 10 MG: 10 TABLET, FILM COATED ORAL at 02:54

## 2018-06-05 RX ADMIN — IPRATROPIUM BROMIDE AND ALBUTEROL SULFATE 3 ML: 2.5; .5 SOLUTION RESPIRATORY (INHALATION) at 07:41

## 2018-06-05 NOTE — H&P
[facility]  HISTORY AND PHYSICAL    Date of Admission: 6/4/2018  Primary Care Physician: Magan Lopez MD    Subjective     Chief Complaint: Altered mental status    History of Present Illness  73-year-old female with past medical history of liver cancer, dementia, diabetes mellitus type II, dysphagia, hypertension and hyperlipidemia was brought into the ER by her family members for altered mental status.  Apparently patient has been confused for last couple days.  Patient was recently discharged from hospital after being treated for hypercalcemia with IV fluid.  For last couple days patient's family member noted she has some mental status change so they decided to bring into the ER for further evaluation.  In the ER patient's chest x-ray was noted for possible underlying left upper lobe pneumonia.  Her CT head in the ER was negative for any acute intracranial findings.  Urinalysis was positive for UTI.  Patient is currently being treated for C. difficile with oral vancomycin at home.  Initial lab was noted for acute renal failure, hyperglycemia, transaminitis, leukocytosis.  Patient will be admitted for altered mental status possibly secondary to UTI and pneumonia.        Review of Systems     Otherwise complete ROS reviewed and negative except as mentioned in the HPI.    Past Medical History:   Past Medical History:   Diagnosis Date   • Cancer     liver   • Dementia    • Diabetes    • Dysphagia    • High cholesterol    • Hypertension    • Laryngopharyngeal reflux    • Lingual tonsil hypertrophy    • Thyroid nodule      Past Surgical History:  Past Surgical History:   Procedure Laterality Date   • APPENDECTOMY     • CHOLECYSTECTOMY     • FINE NEEDLE ASPIRATION     • KNEE SURGERY     • SPLENECTOMY     • TONSILLECTOMY       Social History:  reports that she quit smoking about 6 years ago. She has never used smokeless tobacco. Drug use questions deferred to the  physician. She reports that she does not drink alcohol.    Family History: family history includes Cancer in her mother; Heart disease in her mother.       Allergies:  Allergies   Allergen Reactions   • Cephalexin Shortness Of Breath   • Oxycodone Other (See Comments)     Caused PT to jerk uncontrollably.     • Sulfa Antibiotics Shortness Of Breath   • Sulfamethoxazole-Trimethoprim Shortness Of Breath     Medications:  Prior to Admission medications    Medication Sig Start Date End Date Taking? Authorizing Provider   acetaminophen (TYLENOL) 325 MG tablet Take 650 mg by mouth Every 4 (Four) Hours As Needed for Mild Pain .   Yes Historical Provider, MD   Loperamide HCl (IMODIUM PO) Take 2 mg by mouth 3 (Three) Times a Day As Needed.   Yes Historical Provider, MD   losartan (COZAAR) 50 MG tablet Take 100 mg by mouth Daily.   Yes Historical Provider, MD   traMADol (ULTRAM) 50 MG tablet Take 50 mg by mouth Every 8 (Eight) Hours As Needed for Moderate Pain .   Yes Historical Provider, MD   vancomycin 50 MG/ML reconstituted solution oral solution reconstituted Take 5 mL by mouth Every 6 (Six) Hours.   Yes Historical Provider, MD   amLODIPine (NORVASC) 5 MG tablet Take 1 tablet by mouth Daily. 5/24/18   Darian Garnica MD   choline fenofibrate (TRILIPIX) 135 MG capsule Take 135 mg by mouth Every Night.    Historical Provider, MD   diazePAM (VALIUM) 2 MG tablet Take 1 tablet by mouth Every 12 (Twelve) Hours As Needed for Anxiety. 5/23/18   Darian Garnica MD   donepezil (ARICEPT) 10 MG tablet Take 10 mg by mouth Every Night.    Historical Provider, MD   escitalopram (LEXAPRO) 20 MG tablet Take 20 mg by mouth Daily.    Historical Provider, MD   esomeprazole (nexIUM) 40 MG capsule Take 40 mg by mouth 2 (Two) Times a Day.    Historical Provider, MD   furosemide (LASIX) 20 MG tablet Take 20 mg by mouth Every Other Day.    Historical Provider, MD   hydrochlorothiazide (MICROZIDE) 12.5 MG capsule Take 12.5 mg by mouth  "Daily.    Historical Provider, MD   Insulin Glargine (LANTUS SOLOSTAR) 100 UNIT/ML injection pen Inject 40 Units under the skin 2 (Two) Times a Day.    Historical Provider, MD   Insulin Lispro (HUMALOG KWIKPEN) 100 UNIT/ML solution pen-injector Inject 28 Units under the skin 3 (Three) Times a Day.    Historical Provider, MD   magnesium oxide (MAG-OX) 400 MG tablet Take 400 mg by mouth Daily.    Historical Provider, MD   megestrol (MEGACE) 40 MG/ML suspension Take 20 mL by mouth Daily. 5/24/18   Darian Garnica MD   memantine (NAMENDA) 10 MG tablet Take 10 mg by mouth 2 (Two) Times a Day.    Historical Provider, MD   Misc Natural Products (CORNSILK PO) Take 1 tablet by mouth Daily.    Historical Provider, MD   saccharomyces boulardii (FLORASTOR) 250 MG capsule Take 1 capsule by mouth 2 (Two) Times a Day. 5/23/18   Darian Garnica MD   simvastatin (ZOCOR) 40 MG tablet Take 40 mg by mouth Daily.    Historical Provider, MD   thiamine (VITAMIN B-1) 100 MG tablet Take 100 mg by mouth Daily.    Historical Provider, MD     Objective     Vital Signs: /52   Pulse 70   Temp 97.6 °F (36.4 °C) (Tympanic)   Resp 15   Ht 167.6 cm (66\")   Wt 95.7 kg (211 lb)   SpO2 93%   BMI 34.06 kg/m²   Physical Exam   Constitutional:   Somewhat confused   HENT:   Head: Normocephalic.   Eyes: Pupils are equal, round, and reactive to light.   Neck: Normal range of motion.   Cardiovascular: Normal rate, regular rhythm and normal heart sounds.    Pulmonary/Chest: Effort normal. No respiratory distress. She has wheezes. She has no rales. She exhibits no tenderness.   Abdominal: Soft.   Musculoskeletal: Normal range of motion.   Neurological: She is alert.   Somewhat confused but answers question when asked   Skin: Skin is warm. Capillary refill takes less than 2 seconds.   Psychiatric: She has a normal mood and affect.             Results Reviewed:  Lab Results (last 24 hours)     Procedure Component Value Units Date/Time    " Blood Culture - Blood, Blood, Venous Line [[id number]] Collected:  06/04/18 2145    Specimen:  Blood from Hand, Left Updated:  06/04/18 2203    Blood Culture - Blood, Blood, Venous Line [[id number]] Collected:  06/04/18 2147    Specimen:  Blood from Arm, Right Updated:  06/04/18 2203    Urinalysis With / Culture If Indicated - Urine, Catheter [[id number]]  (Abnormal) Collected:  06/04/18 2028    Specimen:  Urine from Urine, Catheter Updated:  06/04/18 2043     Color, UA Yellow     Appearance, UA Cloudy (A)     pH, UA <=5.0     Specific Gravity, UA 1.016     Glucose, UA Negative     Ketones, UA Negative     Bilirubin, UA Negative     Blood, UA Trace (A)     Protein,  mg/dL (2+) (A)     Leuk Esterase, UA Moderate (2+) (A)     Nitrite, UA Positive (A)     Urobilinogen, UA 0.2 E.U./dL    Urinalysis, Microscopic Only - Urine, Clean Catch [[id number]]  (Abnormal) Collected:  06/04/18 2028    Specimen:  Urine from Urine, Catheter Updated:  06/04/18 2043     RBC, UA 0-2 (A) /HPF      WBC, UA Too Numerous to Count (A) /HPF      Bacteria, UA 4+ (A) /HPF      Squamous Epithelial Cells, UA 0-2 /HPF      Hyaline Casts, UA None Seen /LPF      Methodology Automated Microscopy    Urine Culture - Urine, [[id number]] Collected:  06/04/18 2028    Specimen:  Urine from Urine, Catheter Updated:  06/04/18 2043    CBC Auto Differential [[id number]]  (Abnormal) Collected:  06/04/18 1856    Specimen:  Blood Updated:  06/04/18 2027     WBC 29.80 (H) 10*3/mm3      RBC 4.65 10*6/mm3      Hemoglobin 13.0 g/dL      Hematocrit 41.2 %      MCV 88.6 fL      MCH 28.0 pg      MCHC 31.6 (L) g/dL      RDW 16.8 (H) %      RDW-SD 53.3 fl      MPV 11.8 fL      Platelets 659 (H) 10*3/mm3     Manual Differential [[id number]]  (Abnormal) Collected:  06/04/18 1856    Specimen:  Blood Updated:  06/04/18 2027     Neutrophil % 31.0 (L) %      Lymphocyte % 44.0 %      Monocyte % 4.0 %      Eosinophil % 1.0 %      Bands %  1.0 %      Atypical Lymphocyte % 19.0 (H)  %      Neutrophils Absolute 9.54 (H) 10*3/mm3      Lymphocytes Absolute 13.11 (H) 10*3/mm3      Monocytes Absolute 1.19 10*3/mm3      Eosinophils Absolute 0.30 10*3/mm3      Anisocytosis Slight/1+     Macrocytes Slight/1+     Target Cells Slight/1+     Smudge Cells Slight/1+     Platelet Estimate Increased     Clumped Platelets Present     Giant Platelets Slight/1+    Comprehensive Metabolic Panel [[id number]]  (Abnormal) Collected:  06/04/18 1856    Specimen:  Blood Updated:  06/04/18 2009     Glucose 170 (H) mg/dL      BUN 32 (H) mg/dL      Comment: Specimen hemolyzed. Results may be affected.        Creatinine 1.62 (H) mg/dL      Sodium 141 mmol/L      Potassium 4.2 mmol/L      Comment: Specimen hemolyzed.  Results may be affected.        Chloride 104 mmol/L      CO2 23.0 (L) mmol/L      Calcium 10.5 (H) mg/dL      Total Protein 8.0 g/dL      Albumin 3.70 g/dL      ALT (SGPT) 23 U/L      Comment: Specimen hemolyzed.  Results may be affected.        AST (SGOT) 182 (H) U/L      Comment: Specimen hemolyzed.  Results may be affected.        Alkaline Phosphatase 223 (H) U/L      Comment: Specimen hemolyzed. Results may be affected.        Total Bilirubin 0.9 mg/dL      eGFR Non African Amer 31 (L) mL/min/1.73      Globulin 4.3 gm/dL      A/G Ratio 0.9 (L) g/dL      BUN/Creatinine Ratio 19.8     Anion Gap 14.0 (H) mmol/L     Narrative:       The MDRD GFR formula is only valid for adults with stable renal function between ages 18 and 70.    [place] Draw [[id number]] Collected:  06/04/18 1856    Specimen:  Blood Updated:  06/04/18 2000    Narrative:       The following orders were created for panel order [place] Draw.  Procedure                               Abnormality         Status                     ---------                               -----------         ------                     Light Blue Top[[id number]]                                   Final result               Green Top (Gel)[[id number]]                                   Final result               Lavender Top[[id number]]                                     Final result               Red Top[[id number]]                                          Final result                 Please view results for these tests on the individual orders.    Light Blue Top [[id number]] Collected:  06/04/18 1856    Specimen:  Blood Updated:  06/04/18 2000     Extra Tube hold for add-on     Comment: Auto resulted       Green Top (Gel) [[id number]] Collected:  06/04/18 1856    Specimen:  Blood Updated:  06/04/18 2000     Extra Tube Hold for add-ons.     Comment: Auto resulted.       Lavender Top [[id number]] Collected:  06/04/18 1856    Specimen:  Blood Updated:  06/04/18 2000     Extra Tube hold for add-on     Comment: Auto resulted       Red Top [[id number]] Collected:  06/04/18 1856    Specimen:  Blood Updated:  06/04/18 2000     Extra Tube Hold for add-ons.     Comment: Auto resulted.       TSH [[id number]]  (Normal) Collected:  06/04/18 1856    Specimen:  Blood Updated:  06/04/18 1950     TSH 1.210 mIU/mL     Protime-INR [[id number]]  (Normal) Collected:  06/04/18 1856    Specimen:  Blood Updated:  06/04/18 1924     Protime 14.5 Seconds      INR 1.09    aPTT [[id number]]  (Abnormal) Collected:  06/04/18 1856    Specimen:  Blood Updated:  06/04/18 1924     PTT 37.7 (H) seconds     Magnesium [[id number]]  (Normal) Collected:  06/04/18 1856    Specimen:  Blood Updated:  06/04/18 1922     Magnesium 2.2 mg/dL      Comment: Specimen hemolyzed.  Results may be affected.           Imaging Results (last 24 hours)     Procedure Component Value Units Date/Time    CT Head Without Contrast [[id number]] Collected:  06/04/18 2159     Updated:  06/04/18 2204    Narrative:       CT BRAIN without contrast 6/4/2018 9:08 PM CDT     HISTORY: Altered level of consciousness. Delirium.     COMPARISON: None      DLP: 575 mGy cm. Automated exposure control was utilized to diminish  patient radiation dose.     TECHNIQUE: Serial  axial tomographic images of the brain were obtained  without the use of intravenous contrast.      FINDINGS:   The midline structures are nondisplaced. There is moderate cerebral and  cerebellar volume loss, with an associated increase in the prominence of  the ventricles and sulci. The basilar cisterns are normal in size and  configuration. There is no evidence of intracranial hemorrhage or  mass-effect. There is low attenuation in the periventricular white  matter, consistent with chronic ischemic change. There are no abnormal  extra-axial fluid collections. There is no evidence of tonsillar  herniation.      The included orbits and their contents are unremarkable. The visualized  paranasal sinuses, mastoid air cells and middle ear cavities are clear.  The visualized osseous structures and overlying soft tissues of the  skull and face are intact.        Impression:       Moderate cerebral and cerebellar volume loss with chronic microvascular  disease but no evidence of acute intracranial process.        This report was finalized on 06/04/2018 22:01 by Dr. Son Guerra MD.    XR Chest 1 View [[id number]] Collected:  06/04/18 1915     Updated:  06/04/18 1920    Narrative:       EXAMINATION: Chest 1 view 6/4/2018     HISTORY: Altered mental status     FINDINGS: Upright frontal projection of the chest is compared to prior  exam of 5/4/2018. Lungs are hypoventilated causing accentuation of  bronchovascular markings and cardiac silhouette. There is an ill-defined  opacity within the left upper lobe peripherally suspicious for  pneumonia. The lungs are otherwise clear. There is no effusion or free  air present.       Impression:       1. Expiratory chest.  2. Suspected patchy infiltrate within the periphery of the left upper  lobe.  This report was finalized on 06/04/2018 19:16 by Dr. Son Guerra MD.        I have personally reviewed and interpreted the radiology studies and ECG obtained at time of admission.      Assessment / Plan     Assessment:   Hospital Problem List     Urinary tract infection without hematuria        1.  Left-sided pneumonia  2.  UTI  3.  Acute renal failure  4.  Transaminitis  5.  Hyperglycemia  6.  Leukocytosis  7.  C. difficile infection    Plan:      -Admit to telemetry  -Continue cardiac monitoring  -Continuous pulse ox  -Follow-up EKG  -Continue IV antibiotic  -Follow-up blood culture  -Follow-up sputum culture  -Continue breathing treatment  -Follow-up urine culture  -Continue IV fluid  -Follow-up renal ultrasound  -Follow-up urine sodium, urine creatinine and urine eosinophil  -Hold nephrotoxic medications for now  -Follow-up abdominal ultrasound  -Follow-up hepatitis panel  -Hold hepatotoxic medications for now  -Strict glycemic control and sliding scale  -Follow-up a.m. WBC  -No signs of sepsis at the moment  -Consider aggressive fluid resuscitation if indicated  -Patient is currently on oral vancomycin for C. difficile infection  -Start IV Flagyl and hold oral vancomycin while hospitalized for C. difficile infection  -GI prophylaxis  -DVT prophylaxis          Code Status: Full code     I discussed the patients findings and my recommendations with the patient's RN    Estimated length of stay 2-3 days    Vinod Butler MD   06/04/18   10:12 PM

## 2018-06-05 NOTE — PLAN OF CARE
Problem: Patient Care Overview  Goal: Plan of Care Review  Outcome: Ongoing (interventions implemented as appropriate)   06/05/18 2116   Coping/Psychosocial   Plan of Care Reviewed With patient;family   Plan of Care Review   Progress no change   OTHER   Outcome Summary Pt admitted from Nursing home with UTI, AMS, C DIFF, and pneumonia. Pt is alert to person only and is lethargic. Pt was admitted on 4th floor a few weeks ago with elevated Calcium level and weakness. Family reports that she has had a steady decline since returning to the NH. Pt is on IVF and IV antibiotics. NPO at this time. Pt screened positive for sepsis. Sepsis protocol initiated in the ER. Pt recently tested and diagnosed C DIFF +. Pt is incont of B&B. Pt needs to be a Q2 check and change. Pt turns self.     Goal: Individualization and Mutuality  Outcome: Ongoing (interventions implemented as appropriate)    Goal: Discharge Needs Assessment  Outcome: Ongoing (interventions implemented as appropriate)      Problem: Fall Risk (Adult)  Goal: Identify Related Risk Factors and Signs and Symptoms  Outcome: Outcome(s) achieved Date Met: 06/05/18    Goal: Absence of Fall  Outcome: Ongoing (interventions implemented as appropriate)      Problem: Skin Injury Risk (Adult)  Goal: Identify Related Risk Factors and Signs and Symptoms  Outcome: Outcome(s) achieved Date Met: 06/05/18    Goal: Skin Health and Integrity  Outcome: Ongoing (interventions implemented as appropriate)      Problem: Infection, Risk/Actual (Adult)  Goal: Identify Related Risk Factors and Signs and Symptoms  Outcome: Outcome(s) achieved Date Met: 06/05/18    Goal: Infection Prevention/Resolution  Outcome: Ongoing (interventions implemented as appropriate)

## 2018-06-05 NOTE — PLAN OF CARE
Problem: Patient Care Overview  Goal: Plan of Care Review  Outcome: Ongoing (interventions implemented as appropriate)   06/05/18 1056   Coping/Psychosocial   Plan of Care Reviewed With patient;family;other (see comments)  (RN, Velma)   OTHER   Outcome Summary CBSE completed. Full range of consistencies except mechanical soft solids were presented. Pt had no overt s/s of aspiration throughout the evaluation and her vocal quality remained clear. She was somewhat lethargic, however and required consistent cues to attend and participate. She was unable to bring a cracker to her mouth independently to take a bite so SLP assisted. She had difficulty taking a bite of the cracker initially, but had adequate although slow mastication. Recommend mechanical soft solids and thin liquids. Meds whole with applesauce. Pt may benefit from cognitive evaluation.

## 2018-06-05 NOTE — PROGRESS NOTES
"Pharmacy Dosing Service  Automatic Renal Adjustment  Pepcid    Assessment/Action/Plan:  Based on prescribing information provided by the drug , Pepcid 40 mg PO every 24 hours, has been changed to Pepcid 20 mg PO every 24 hours. Pharmacy will continue to monitor daily and make further adjustment(s) accordingly.     Subjective:  Bindu Fountain is a 73 y.o. female     Additional Factors Considered:  • Patient disposition per documentation  • Disease state or condition being treated    Objective:  Ht: 167.6 cm (65.98\"); Wt: 89.9 kg (198 lb 1.6 oz)  Estimated Creatinine Clearance: 34.4 mL/min (A) (by C-G formula based on SCr of 1.62 mg/dL (H)).   Lab Results   Component Value Date    CREATININE 1.62 (H) 06/04/2018       Hasmukh Gottlieb, [facility]  06/05/18 2:14 AM    "

## 2018-06-05 NOTE — PROGRESS NOTES
Discharge Planning Assessment  Psychiatric     Patient Name: Bindu Fountain  MRN: [id number]  Today's Date: 6/5/2018    Admit Date: 6/4/2018          Discharge Needs Assessment     Row Name 06/05/18 1405       Living Environment    Lives With alone    Current Living Arrangements home/apartment/condo    Primary Care Provided by self    Provides Primary Care For no one    Family Caregiver if Needed child(will), adult    Quality of Family Relationships helpful;involved;supportive    Able to Return to Prior Arrangements no    Living Arrangement Comments SHE WILL NEED SHORT REHAB STAY AT SKILLED NSG FACILITY BEFORE RETURNING HOME       Resource/Environmental Concerns    Resource/Environmental Concerns none    Transportation Concerns car, none       Transition Planning    Patient/Family Anticipates Transition to long term care facility    Patient/Family Anticipated Services at Transition skilled nursing    Transportation Anticipated health plan transportation       Discharge Needs Assessment    Readmission Within the Last 30 Days current reason for admission unrelated to previous admission    Concerns to be Addressed discharge planning    Concerns Comments WILL NEED SKILLED NSG FACILITY    Equipment Currently Used at Home commode;glucometer;shower chair;hospital bed    Equipment Needed After Discharge none    Outpatient/Agency/Support Group Needs skilled nursing facility            Discharge Plan     Row Name 06/05/18 1412       Plan    Plan [facility]; PENDING EVAL AND BED OFFER    Patient/Family in Agreement with Plan yes    Plan Comments PT. WILL NEED SHORT REHAB STAY PRIOR TO RETURNING HOME.  PT'S OZZYR, SAY MURRAYS PT. LISTED AT [facility].  WILL MAKE REFERRAL WHEN ALL THERAPY EVAL'S ARE COMPLETE.  WILL FOLLOW.          Destination     No service coordination in this encounter.      Durable Medical Equipment     No service coordination in this encounter.      Dialysis/Infusion     No service coordination in  this encounter.      Home Medical Care     No service coordination in this encounter.      Social Care     No service coordination in this encounter.                Demographic Summary    No documentation.           Functional Status    No documentation.           Psychosocial    No documentation.           Abuse/Neglect    No documentation.           Legal    No documentation.           Substance Abuse    No documentation.           Patient Forms    No documentation.         DAWNA Mackenzie

## 2018-06-05 NOTE — THERAPY EVALUATION
Acute Care - Occupational Therapy Initial Evaluation  [facility]     Patient Name: Bindu Fountain  : 1945  MRN: [id number]  Today's Date: 2018  Onset of Illness/Injury or Date of Surgery: 18  Date of Referral to OT: 18  Referring Physician: Ara BUSTOS    Admit Date: 2018       ICD-10-CM ICD-9-CM   1. Urinary tract infection without hematuria, site unspecified N39.0 599.0   2. Pneumonia due to infectious organism, unspecified laterality, unspecified part of lung J18.9 136.9     484.8   3. Altered mental status, unspecified altered mental status type R41.82 780.97   4. Clostridium difficile infection B96.89 041.84   5. Oral phase dysphagia R13.11 787.21   6. Decreased activities of daily living (ADL) Z78.9 V49.89     Patient Active Problem List   Diagnosis   • Thrombocytosis after splenectomy   • Urinary tract infection without hematuria   • Chronic cystitis   • Renal cyst   • Pneumonia symptoms     Past Medical History:   Diagnosis Date   • Cancer     liver   • Dementia    • Diabetes    • Dysphagia    • High cholesterol    • Hypertension    • Laryngopharyngeal reflux    • Lingual tonsil hypertrophy    • Thyroid nodule      Past Surgical History:   Procedure Laterality Date   • APPENDECTOMY     • CHOLECYSTECTOMY     • FINE NEEDLE ASPIRATION     • KNEE SURGERY     • SPLENECTOMY     • TONSILLECTOMY            OT ASSESSMENT FLOWSHEET (last 72 hours)      Occupational Therapy Evaluation     Row Name 18 1305                   OT Evaluation Time/Intention    Subjective Information complains of;weakness;fatigue;pain  -AC        Document Type evaluation  -AC        Mode of Treatment occupational therapy  -AC        Comment eval at 1346  -           General Information    Patient Profile Reviewed? yes  -AC        Onset of Illness/Injury or Date of Surgery 18  -        Referring Physician Ara BUSTOS  -        Patient Observations cooperative;agree to  therapy;lethargic  -AC        Patient/Family Observations daughter present  -AC        General Observations of Patient lying in fowlers in bed, IV, SCDs, sleeping, awakens but falls back asleep  -AC        Prior Level of Function independent:;all household mobility;community mobility;gait;transfer;bed mobility;feeding;grooming;min assist:;dressing;bathing  -AC        Equipment Currently Used at Home wheelchair;walker, rolling;shower chair;raised toilet;grab bar   to be provided by NH  -AC        Pertinent History of Current Functional Problem AMS, NH pt, recently discharged back to NH from [facility] with gradual decline; Dx: UTI, hematuria, pneumonia, AMS, C diff, acute renal failure, leukocytosis, metabolic encephalopathy  -AC        Existing Precautions/Restrictions fall   C diff  -AC        Limitations/Impairments safety/cognitive  -AC        Risks Reviewed family:;LOB;nausea/vomiting;dizziness;increased discomfort;change in vital signs;lines disloged  -AC        Benefits Reviewed family:;improve function;increase independence;increase strength;increase balance;decrease pain;increase knowledge  -        Barriers to Rehab cognitive status  -           Relationship/Environment    Lives With alone  -AC        Family Caregiver if Needed child(will), adult  -AC           Resource/Environmental Concerns    Current Living Arrangements home/apartment/condo;other (see comments)   shes been at SNF for rehab since her d/c 5/23/18  -           Cognitive Assessment/Interventions    Additional Documentation Cognitive Assessment/Intervention (Group)  -           Cognitive Assessment/Intervention- PT/OT    Orientation Status (Cognition) oriented to;person;place;disoriented to;situation;time  -AC        Personal Safety Interventions elopement precautions initiated;fall prevention program maintained;muscle strengthening facilitated;supervised activity  -AC           Safety Issues, Functional Mobility    Impairments Affecting  Function (Mobility) strength  -           Bed Mobility Assessment/Treatment    Bed Mobility Assessment/Treatment rolling left;rolling right;supine-sit;sit-supine;scooting/bridging  -        Rolling Left [place] (Bed Mobility) moderate assist (50% patient effort)  -        Rolling Right [place] (Bed Mobility) moderate assist (50% patient effort)  -        Scooting/Bridging [place] (Bed Mobility) maximum assist (25% patient effort);2 person assist  -        Supine-Sit [place] (Bed Mobility) moderate assist (50% patient effort)  -        Sit-Supine [place] (Bed Mobility) minimum assist (75% patient effort)  -        Assistive Device (Bed Mobility) bed rails;draw sheet  -           Functional Mobility    Functional Mobility- Ind. Level unable to perform  -           Transfer Assessment/Treatment    Transfer Assessment/Treatment other (see comments)  -        Comment (Transfers) unable to perform due to weakness  -           ADL Assessment/Intervention    BADL Assessment/Intervention toileting  -           Toileting Assessment/Training    [place] Level (Toileting) toileting skills;dependent (less than 25% patient effort)  -        Toileting Position supine  -           BADL Safety/Performance    Impairments, BADL Safety/Performance endurance/activity tolerance;strength  -           General ROM    GENERAL ROM COMMENTS WFL AROM BUE  -           General Assessment (Manual Muscle Testing)    Comment, General Manual Muscle Testing (MMT) Assessment functionally 4+/5 BUE  -           Motor Assessment/Interventions    Additional Documentation Balance (Group)  -           Balance    Balance static sitting balance;static standing balance;dynamic sitting balance;dynamic standing balance  -           Static Sitting Balance    Level of [place] (Unsupported Sitting, Static Balance) standby assist  -        Sitting Position (Unsupported Sitting, Static Balance)  sitting on edge of bed  -AC           Dynamic Sitting Balance    Level of Terry, Reaches Outside Midline (Sitting, Dynamic Balance) contact guard assist  -AC        Sitting Position, Reaches Outside Midline (Sitting, Dynamic Balance) sitting on edge of bed  -AC           Sensory Assessment/Intervention    Sensory General Assessment no sensation deficits identified  -AC           Positioning and Restraints    Pre-Treatment Position in bed  -AC        Post Treatment Position bed  -AC        In Bed fowlers;exit alarm on;with family/caregiver;with nsg;side rails up x2;SCD pump applied  -AC           Pain Assessment    Additional Documentation Pain Scale: FACES Pre/Post-Treatment (Group)  -AC           Pain Scale: FACES Pre/Post-Treatment    Pain: FACES Scale, Pretreatment 4-->hurts little more  -AC        Pain: FACES Scale, Post-Treatment 4-->hurts little more  -AC        Pre/Post Treatment Pain Comment generalized pain  -AC           Plan of Care Review    Plan of Care Reviewed With patient;daughter  -AC           Clinical Impression (OT)    Date of Referral to OT 06/04/18  -AC        OT Diagnosis decreased adl  -AC        Prognosis (OT Eval) good  -AC        Criteria for Skilled Therapeutic Interventions Met (OT Eval) yes;treatment indicated  -AC        Rehab Potential (OT Eval) good, to achieve stated therapy goals  -AC        Therapy Frequency (OT Eval) daily  -AC        Predicted Duration of Therapy Intervention (OT Eval) 10 days  -AC        Care Plan Review (OT) evaluation/treatment results reviewed;care plan/treatment goals reviewed;risks/benefits reviewed;current/potential barriers reviewed;patient/other agree to care plan  -AC        Care Plan Review, Other Participant (OT Eval) daughter  -AC        Anticipated Discharge Disposition (OT) skilled nursing facility  -AC           Planned OT Interventions    Planned Therapy Interventions (OT Eval) activity tolerance training;BADL retraining;functional  balance retraining;occupation/activity based interventions;patient/caregiver education/training;strengthening exercise;transfer/mobility retraining  -AC           OT Goals    Transfer Goal Selection (OT) transfer, OT goal 1  -AC        Dressing Goal Selection (OT) dressing, OT goal 1  -AC        Toileting Goal Selection (OT) toileting, OT goal 1  -AC           Transfer Goal 1 (OT)    Activity/Assistive Device (Transfer Goal 1, OT) bed-to-chair/chair-to-bed;toilet;commode, bedside without drop arms  -AC        [place] Level/Cues Needed (Transfer Goal 1, OT) minimum assist (75% or more patient effort)  -AC        Time Frame (Transfer Goal 1, OT) long term goal (LTG);10 days  -AC        Barriers (Transfers Goal 1, OT) cognitive  -AC        Progress/Outcome (Transfer Goal 1, OT) goal ongoing  -AC           Dressing Goal 1 (OT)    Activity/Assistive Device (Dressing Goal 1, OT) dressing skills, all  -AC        [place]/Cues Needed (Dressing Goal 1, OT) minimum assist (75% or more patient effort)  -AC        Time Frame (Dressing Goal 1, OT) long term goal (LTG);10 days  -AC        Barriers (Dressing Goal 1, OT) cognitive  -AC        Progress/Outcome (Dressing Goal 1, OT) goal ongoing  -AC           Toileting Goal 1 (OT)    Activity/Device (Toileting Goal 1, OT) toileting skills, all;commode, bedside without drop arms  -AC        [place] Level/Cues Needed (Toileting Goal 1, OT) minimum assist (75% or more patient effort)  -AC        Time Frame (Toileting Goal 1, OT) long term goal (LTG);10 days  -AC        Barriers (Toileting Goal 1, OT) cognitive  -AC        Progress/Outcome (Toileting Goal 1, OT) goal ongoing  -AC          User Key  (r) = Recorded By, (t) = Taken By, (c) = Cosigned By    Initials Name Effective Dates    SIXTO Gonzalez/MARIELA 06/22/15 -            Occupational Therapy Education     Title: PT OT SLP Therapies (Done)     Topic: Occupational Therapy (Done)     Point: ADL training (Done)      Description: Instruct learner(s) on proper safety adaptation and remediation techniques during self care or transfers.   Instruct in proper use of assistive devices.   Learning Progress Summary     Learner Status Readiness Method Response Comment Documented by    Patient Done Acceptance E,TB VU OT POC, benefits of activity AC 06/05/18 1455    Family Done Acceptance E,TB VU OT POC, benefits of activity AC 06/05/18 1455          Point: Home exercise program (Done)     Description: Instruct learner(s) on appropriate technique for monitoring, assisting and/or progressing therapeutic exercises/activities.   Learning Progress Summary     Learner Status Readiness Method Response Comment Documented by    Patient Done Acceptance E,TB VU OT POC, benefits of activity AC 06/05/18 1455    Family Done Acceptance E,TB VU OT POC, benefits of activity AC 06/05/18 1455                      User Key     Initials Effective Dates Name Provider Type Discipline     06/22/15 -  Venkat Flores, OTR/L Occupational Therapist OT                  OT Recommendation and Plan  Outcome Summary/Treatment Plan (OT)  Anticipated Discharge Disposition (OT): skilled nursing facility  Planned Therapy Interventions (OT Eval): activity tolerance training, BADL retraining, functional balance retraining, occupation/activity based interventions, patient/caregiver education/training, strengthening exercise, transfer/mobility retraining  Therapy Frequency (OT Eval): daily  Plan of Care Review  Plan of Care Reviewed With: patient, daughter  Plan of Care Reviewed With: patient, daughter  Outcome Summary: OT eval completed.  Pt oriented to person and place.  Disoriented to time and situation.  Able to follow 1 step commands about 75% of the time with cueing.  She was intermittently lethargic during eval and required encouragement to participate at times.  Came to EOB with modA, returned to supine with Liliam.  Rolled L and R three times with min-modA needed.  Pt  was unable to transfer or ambulate due to weakness and inability to remain alert throughout eval.  Pt was dependent for all toileting and hygiene.  She is typically independent at baseline and lives alone.  She plans to return to SNF at discharge for additoinal rehab.  OT agrees with this plan.  OT will continue to work with pt to increase her strength, endurance, balance, and safety with ADL.          Outcome Measures     Row Name 06/05/18 1454             How much help from another is currently needed...    Putting on and taking off regular lower body clothing? 2  -AC      Bathing (including washing, rinsing, and drying) 2  -AC      Toileting (which includes using toilet bed pan or urinal) 2  -AC      Putting on and taking off regular upper body clothing 2  -AC      Taking care of personal grooming (such as brushing teeth) 2  -AC      Eating meals 3  -AC      Score 13  -AC         Functional Assessment    Outcome Measure Options AM-PAC 6 Clicks Daily Activity (OT)  -AC        User Key  (r) = Recorded By, (t) = Taken By, (c) = Cosigned By    Initials Name Provider Type    AC Venkat Flores OTR/L Occupational Therapist          Time Calculation:   OT Start Time: 1346  OT Stop Time: 1459  OT Time Calculation (min): 73 min    Therapy Charges for Today     Code Description Service Date Service Provider Modifiers Qty    55108052398  OT SELFCARE CURRENT 6/5/2018 Venkat Flores OTR/L FLOWER, CL 1    [id number]  OT SELFCARE PROJECTED 6/5/2018 Venkat Flores OTR/L FLOWER, CK 1    [id number]  OT EVAL MOD COMPLEXITY 4 6/5/2018 SIXTO Rubin/L FLOWER KX 1    [id number]  OT SELF CARE/MGMT/TRAIN EA 15 MIN 6/5/2018 Venkat Flores OTR/L FLOWER, KX 1          OT G-codes  OT Professional Judgement Used?: Yes  OT Functional Scales Options: AM-PAC 6 Clicks Daily Activity (OT)  Score: 13  Functional Limitation: Self care  Self Care Current Status (): At least 60 percent but less than 80 percent impaired, limited or  restricted  Self Care Goal Status (): At least 40 percent but less than 60 percent impaired, limited or restricted    Venkat Flores, OTR/L  6/5/2018

## 2018-06-05 NOTE — THERAPY EVALUATION
Acute Care - Speech Language Pathology   Swallow Initial Evaluation [facility]     Patient Name: Bindu Fountain  : 1945  MRN: [id number]  Today's Date: 2018               Admit Date: 2018  CBSE completed. Full range of consistencies except mechanical soft solids were presented. Pt had no overt s/s of aspiration throughout the evaluation and her vocal quality remained clear. She was somewhat lethargic, however and required consistent cues to attend and participate. She was unable to bring a cracker to her mouth independently to take a bite so SLP assisted. She had difficulty taking a bite of the cracker initially, but had adequate although slow mastication. Recommend mechanical soft solids and thin liquids. Meds whole with applesauce. Pt may benefit from cognitive evaluation.  Jhonatan Sanders, CCC-SLP 2018 10:59 AM    Visit Dx:     ICD-10-CM ICD-9-CM   1. Urinary tract infection without hematuria, site unspecified N39.0 599.0   2. Pneumonia due to infectious organism, unspecified laterality, unspecified part of lung J18.9 136.9     484.8   3. Altered mental status, unspecified altered mental status type R41.82 780.97   4. Clostridium difficile infection B96.89 041.84   5. Oral phase dysphagia R13.11 787.21     Patient Active Problem List   Diagnosis   • Thrombocytosis after splenectomy   • Urinary tract infection without hematuria   • Chronic cystitis   • Renal cyst   • Pneumonia symptoms     Past Medical History:   Diagnosis Date   • Cancer     liver   • Dementia    • Diabetes    • Dysphagia    • High cholesterol    • Hypertension    • Laryngopharyngeal reflux    • Lingual tonsil hypertrophy    • Thyroid nodule      Past Surgical History:   Procedure Laterality Date   • APPENDECTOMY     • CHOLECYSTECTOMY     • FINE NEEDLE ASPIRATION     • KNEE SURGERY     • SPLENECTOMY     • TONSILLECTOMY            SWALLOW EVALUATION (last 72 hours)      SLP Adult Swallow Evaluation     Row Name 18 0951                    Rehab Evaluation    Document Type evaluation  -MB        Subjective Information fatigue  -MB        Patient Observations --   fatigued  -MB           General Information    Patient Profile Reviewed yes  -MB        Pertinent History Of Current Problem AMS, C-diff, pneumonia, UTI. Hx of dementia, DM, GERD, HTN, lingual tonsil hypertrophy, tonsillectomy.  -MB        Current Method of Nutrition NPO  -MB        Precautions/Limitations, Vision WFL with corrective lenses  -MB        Prior Level of Function-Communication cognitive-linguistic impairment;other (see comments)   dementia  -MB        Prior Level of Function-Swallowing no diet consistency restrictions  -MB        Plans/Goals Discussed with patient and family  -MB        Barriers to Rehab cognitive status  -MB        Patient's Goals for Discharge patient did not state  -MB           Pain Assessment    Additional Documentation Pain Scale: FACES Pre/Post-Treatment (Group)  -MB           Pain Scale: FACES Pre/Post-Treatment    Pain: FACES Scale, Pretreatment 0-->no hurt  -MB           Oral Motor and Function    Dentition Assessment upper dentures/partial in place;lower dentures/partial in place  -MB        Secretion Management WNL/WFL  -MB        Mucosal Quality dry  -MB        Volitional Swallow WFL  -MB        Volitional Cough weak  -MB           Oral Musculature and Cranial Nerve Assessment    Oral Motor General Assessment generalized oral motor weakness  -MB           General Eating/Swallowing Observations    Respiratory Support Currently in Use room air  -MB        Eating/Swallowing Skills fed by SLP  -MB        Positioning During Eating upright in bed  -MB        Utensils Used spoon;straw  -MB        Consistencies Trialed regular textures;pureed;thin liquids;nectar/syrup-thick liquids;honey-thick liquids  -MB           Clinical Swallow Eval    Oral Prep Phase impaired  -MB        Oral Transit WFL  -MB        Oral Residue WFL  -MB         Pharyngeal Phase no overt signs/symptoms of pharyngeal impairment  -MB        Esophageal Phase unremarkable  -MB        Clinical Swallow Evaluation Summary Full range of consistencies except mechanical soft solids were presented. Pt had no overt s/s of aspiration throughout the evaluation and her vocal quality remained clear. She was somewhat lethargic, however and required consistent cues to attend and participate. She was unable to bring a cracker to her mouth independently to take a bite so SLP assisted. She had difficulty taking a bite of the cracker initially, but had adequate although slow mastication.   -MB           Oral Prep Concerns    Oral Prep Concerns prolonged mastication;poor rotary chew  -MB        Prolonged Mastication regular consistencies  -MB        Poor Rotary Chew regular consistencies  -MB           Clinical Impression    SLP Swallowing Diagnosis functional pharyngeal phase;mild;oral dysfunction  -MB        Functional Impact risk of malnutrition  -MB        Rehab Potential/Prognosis, Swallowing good, to achieve stated therapy goals  -MB        Criteria for Skilled Therapeutic Interventions Met demonstrates skilled criteria  -MB           Recommendations    Therapy Frequency (Swallow) 2 days per week  -MB        Predicted Duration Therapy Intervention (Days) until discharge  -MB        SLP Diet Recommendation soft textures;chopped;thin liquids  -MB        Recommended Precautions and Strategies upright posture during/after eating;small bites of food and sips of liquid  -MB        SLP Rec. for Method of Medication Administration meds whole;with pudding or applesauce  -MB        Monitor for Signs of Aspiration yes;cough;gurgly voice;throat clearing  -MB        Anticipated Dischage Disposition skilled nursing facility  -MB           Swallow Goals (SLP)    Oral Nutrition/Hydration Goal Selection (SLP) oral nutrition/hydration, SLP goal 1  -MB           Oral Nutrition/Hydration Goal 1 (SLP)    Oral  Nutrition/Hydration Goal 1, SLP Pt will tolerate least restrictive diet with no overt s/s of aspiration.  -MB        Time Frame (Oral Nutrition/Hydration Goal 1, SLP) by discharge  -MB        Barriers (Oral Nutrition/Hydration Goal 1, SLP) n/a  -MB        Progress/Outcomes (Oral Nutrition/Hydration Goal 1, SLP) goal ongoing  -MB          User Key  (r) = Recorded By, (t) = Taken By, (c) = Cosigned By    Initials Name Effective Dates    MERRY Sanders, [facility]-SLP 08/02/16 -         EDUCATION  The patient has been educated in the following areas:   Dysphagia (Swallowing Impairment).    SLP Recommendation and Plan  SLP Swallowing Diagnosis: functional pharyngeal phase, mild, oral dysfunction  SLP Diet Recommendation: soft textures, chopped, thin liquids  Recommended Precautions and Strategies: upright posture during/after eating, small bites of food and sips of liquid     Monitor for Signs of Aspiration: yes, cough, gurgly voice, throat clearing     Criteria for Skilled Therapeutic Interventions Met: demonstrates skilled criteria  Anticipated Dischage Disposition: skilled nursing facility  Rehab Potential/Prognosis, Swallowing: good, to achieve stated therapy goals  Therapy Frequency (Swallow): 2 days per week  Predicted Duration Therapy Intervention (Days): until discharge       Plan of Care Reviewed With: patient, family, other (see comments) (Velma RAMIREZ)  Plan of Care Review  Plan of Care Reviewed With: patient, family, other (see comments) (Velma RAMIREZ)  Outcome Summary: CBSE completed. Full range of consistencies except mechanical soft solids were presented. Pt had no overt s/s of aspiration throughout the evaluation and her vocal quality remained clear. She was somewhat lethargic, however and required consistent cues to attend and participate. She was unable to bring a cracker to her mouth independently to take a bite so SLP assisted. She had difficulty taking a bite of the cracker initially, but had adequate  although slow mastication. Recommend mechanical soft solids and thin liquids. Meds whole with applesauce. Pt may benefit from cognitive evaluation.          SLP GOALS     Row Name 06/05/18 0951             Oral Nutrition/Hydration Goal 1 (SLP)    Oral Nutrition/Hydration Goal 1, SLP Pt will tolerate least restrictive diet with no overt s/s of aspiration.  -MB      Time Frame (Oral Nutrition/Hydration Goal 1, SLP) by discharge  -MB      Barriers (Oral Nutrition/Hydration Goal 1, SLP) n/a  -MB      Progress/Outcomes (Oral Nutrition/Hydration Goal 1, SLP) goal ongoing  -MB        User Key  (r) = Recorded By, (t) = Taken By, (c) = Cosigned By    Initials Name Provider Type    MERRY Sanders CCC-SLP Speech and Language Pathologist             SLP Outcome Measures (last 72 hours)      SLP Outcome Measures     Row Name 06/05/18 1000             SLP Outcome Measures    Outcome Measure Used? Adult NOMS  -MB         FCM Scores    FCM Chosen Swallowing  -MB      Swallowing FCM Score 5  -MB        User Key  (r) = Recorded By, (t) = Taken By, (c) = Cosigned By    Initials Name Effective Dates    MERRY Sanders CCC-SLP 08/02/16 -            Time Calculation:         Time Calculation- SLP     Row Name 06/05/18 1059             Time Calculation- SLP    SLP Start Time 0951  -MB      SLP Stop Time 1045  -MB      SLP Time Calculation (min) 54 min  -MB      SLP Received On 06/05/18  -MB      SLP Goal Re-Cert Due Date 06/15/18  -MB        User Key  (r) = Recorded By, (t) = Taken By, (c) = Cosigned By    Initials Name Provider Type    MERRY Sanders CCC-SLP Speech and Language Pathologist          Therapy Charges for Today     Code Description Service Date Service Provider Modifiers Qty    41066825811 HC ST SWALLOWING CURRENT STATUS 6/5/2018 MARIIA Ramires GN, CJ 1    [id number] HC ST SWALLOWING PROJECTED 6/5/2018 MARIIA Ramires, CJ 1    [id number] HC ST EVAL ORAL PHARYNG SWALLOW 4  6/5/2018 Jhonatan Sanders CCC-SLP GN 1          SLP G-Codes  SLP NOMS Used?: Yes  Functional Limitations: Swallowing  Swallow Current Status (): At least 20 percent but less than 40 percent impaired, limited or restricted  Swallow Goal Status (): At least 20 percent but less than 40 percent impaired, limited or restricted    MARIIA Mckeon  6/5/2018

## 2018-06-05 NOTE — PROGRESS NOTES
[facility] Medicine Services  INPATIENT PROGRESS NOTE    Length of Stay: 1  Date of Admission: 6/4/2018  Primary Care Physician: Magan Lopez MD    Subjective   Chief Complaint: Follow-up AMS  HPI   The patient is resting in bed with daughter at bedside. She tells me she feels ok today. She denies any complaints and is somewhat lethargic this morning. Her daughter tells me that she feels that the patient has had an overall functional decline since her discharge from the hospital on 5/23. I am also told by her daughter that she began having diarrhea again on Saturday. Her stool was tested again at the nursing facility and noted to be positive for Clostridium difficile, so they put her back on Vancomycin.     Review of Systems   Difficult to fully assess due to patient's mental status.    Objective    Temp:  [96.5 °F (35.8 °C)-97.6 °F (36.4 °C)] (P) 96.5 °F (35.8 °C)  Heart Rate:  [58-70] 68  Resp:  [15-17] (P) 17  BP: (115-158)/(42-69) (P) 146/61  Physical Exam   Constitutional: She is oriented to person, place, and time. She appears well-developed and well-nourished. No distress.   HENT:   Head: Normocephalic and atraumatic.   Neck: Normal range of motion. Neck supple. No JVD present. No tracheal deviation present. No thyromegaly present.   Cardiovascular: Normal rate, regular rhythm, normal heart sounds and intact distal pulses.  Exam reveals no gallop and no friction rub.    No murmur heard.  Pulmonary/Chest: Effort normal and breath sounds normal. No respiratory distress. She has no wheezes. She has no rales.   Abdominal: Soft. Bowel sounds are normal. She exhibits distension. There is tenderness (RUQ tenderness with palpation). There is no guarding.   Musculoskeletal: Normal range of motion. She exhibits no edema or tenderness.   Generalized weakness   Lymphadenopathy:     She has no cervical adenopathy.   Neurological: She is oriented to person, place, and time. No cranial  nerve deficit.   Lethargic, but does arouse easily to voice   Skin: Skin is warm and dry. No rash noted. No erythema.   Psychiatric: She has a normal mood and affect.   Vitals reviewed.    Results Review:  I have reviewed the labs, radiology results, and diagnostic studies.    Laboratory Data:     Results from last 7 days  Lab Units 06/05/18  0437 06/04/18  1856   WBC 10*3/mm3 46.97* 29.80*   HEMOGLOBIN g/dL 12.1 13.0   HEMATOCRIT % 38.0 41.2   PLATELETS 10*3/mm3 620* 659*       Results from last 7 days  Lab Units 06/05/18  0437 06/04/18  1856   SODIUM mmol/L 144 141   POTASSIUM mmol/L 4.0 4.2   CHLORIDE mmol/L 108 104   CO2 mmol/L 24.0 23.0*   BUN mg/dL 28* 32*   CREATININE mg/dL 1.59* 1.62*   CALCIUM mg/dL 10.1 10.5*   BILIRUBIN mg/dL 0.7 0.9   ALK PHOS U/L 211* 223*   ALT (SGPT) U/L 26 23   AST (SGOT) U/L 147* 182*   GLUCOSE mg/dL 117* 170*     Radiology Data:   Imaging Results (last 24 hours)     Procedure Component Value Units Date/Time    CT Head Without Contrast [[id number]] Collected:  06/04/18 2159     Updated:  06/04/18 2204    Narrative:       CT BRAIN without contrast 6/4/2018 9:08 PM CDT     HISTORY: Altered level of consciousness. Delirium.     COMPARISON: None      DLP: 575 mGy cm. Automated exposure control was utilized to diminish  patient radiation dose.     TECHNIQUE: Serial axial tomographic images of the brain were obtained  without the use of intravenous contrast.      FINDINGS:   The midline structures are nondisplaced. There is moderate cerebral and  cerebellar volume loss, with an associated increase in the prominence of  the ventricles and sulci. The basilar cisterns are normal in size and  configuration. There is no evidence of intracranial hemorrhage or  mass-effect. There is low attenuation in the periventricular white  matter, consistent with chronic ischemic change. There are no abnormal  extra-axial fluid collections. There is no evidence of tonsillar  herniation.      The included  orbits and their contents are unremarkable. The visualized  paranasal sinuses, mastoid air cells and middle ear cavities are clear.  The visualized osseous structures and overlying soft tissues of the  skull and face are intact.        Impression:       Moderate cerebral and cerebellar volume loss with chronic microvascular  disease but no evidence of acute intracranial process.        This report was finalized on 06/04/2018 22:01 by Dr. Son Guerra MD.    XR Chest 1 View [[id number]] Collected:  06/04/18 1915     Updated:  06/04/18 1920    Narrative:       EXAMINATION: Chest 1 view 6/4/2018     HISTORY: Altered mental status     FINDINGS: Upright frontal projection of the chest is compared to prior  exam of 5/4/2018. Lungs are hypoventilated causing accentuation of  bronchovascular markings and cardiac silhouette. There is an ill-defined  opacity within the left upper lobe peripherally suspicious for  pneumonia. The lungs are otherwise clear. There is no effusion or free  air present.       Impression:       1. Expiratory chest.  2. Suspected patchy infiltrate within the periphery of the left upper  lobe.  This report was finalized on 06/04/2018 19:16 by Dr. Son Guerra MD.        I have reviewed the patient current medications.     Assessment/Plan   Assessment:  1. Left upper lobe patchy infiltrate concerning for pneumonia- healthcare associated  2. Urinary tract infection, recently treated for Klebsiella UTI   3. Acute metabolic encephalopathy- likely secondary to above  4. Acute kidney injury on chronic kidney disease- difficult to ascertain baseline kidney function  5. Leukocytosis  6. Recent diagnosis of cholangicarcinoma  7. Clostridium Difficile colitis- completed treatment with oral Vancomycin   8. Dementia  9. Insulin dependent Diabetes Mellitus Type 2  10. Asplenic  11. Thrombocytosis    Plan:  1. Growth of gram-negative bacilli on urine culture.  Patient is allergic to cephalosporins, but is  on Azactam for pneumonia which should cover the gram-negative bacilli.  We will continue to follow for final results.  2.  Continue Azactam and vancomycin for healthcare associated pneumonia.  We will repeat chest x-ray in a.m.  3.  Renal ultrasound is pending.  Urine for electrolytes  4.  CT of head with chronic microvascular disease with no acute intracranial process.  We will check ammonia level  5.  AST and alkaline phosphatase are stable compared to last admission.  An abdominal ultrasound was ordered, but I do not feel that we will find anything new on this exam  6.  Blood cultures pending.  Check urine for strep and legionella  7.  Consult physical and occupational therapy  8.  Continue IV fluids at current rate. Hold Lasix and HCTZ  9.  Not surprising that the patient tested positive for CDiff at the nursing home given the close proximity to being treated for this. Discontinue Flagyl and continue oral Vanco. Probiotic.  10.  Speech therapy is currently evaluating, okay for diet per their service  11.  Last blood glucoses-170, 117.  Discontinue Levemir until patient's oral intake has improved.  Place on Accu-Cheks and sliding scale insulin only for the time being  12.  Labs in a.m.-CBC, CMP    Discharge Planning: I expect the patient to be discharged to SNF in ? days.    Ara Garcia, JULI   06/05/18   9:18 AM

## 2018-06-05 NOTE — PLAN OF CARE
Problem: Patient Care Overview  Goal: Plan of Care Review  Outcome: Ongoing (interventions implemented as appropriate)  Patient disoriented x4 today. Very sleepy this morning. Seems to be improving throughout the day. IV, ADA diet. Speech therapy following. meds given in applesauce. I/O cath for urines - incontinent. Family at bedside. Continue to monitor    06/05/18 1410   Coping/Psychosocial   Plan of Care Reviewed With patient;daughter;family   Plan of Care Review   Progress no change     Goal: Discharge Needs Assessment  Outcome: Ongoing (interventions implemented as appropriate)   06/05/18 1410   Discharge Needs Assessment   Readmission Within the Last 30 Days current reason for admission unrelated to previous admission   Concerns to be Addressed discharge planning   Patient/Family Anticipated Services at Transition ;skilled nursing   Transportation Anticipated health plan transportation   Anticipated Changes Related to Illness inability to care for self   Equipment Needed After Discharge none   Outpatient/Agency/Support Group Needs skilled nursing facility   Discharge Facility/Level of Care Needs nursing facility, skilled   Disability   Equipment Currently Used at Home commode;shower chair;wheelchair     Goal: Interprofessional Rounds/Family Conf  Outcome: Ongoing (interventions implemented as appropriate)   06/05/18 1410   Interdisciplinary Rounds/Family Conf   Participants ;family;nursing;advanced practice nurse;patient;physical therapy;physician;speech language pathology       Problem: Fall Risk (Adult)  Goal: Absence of Fall  Outcome: Ongoing (interventions implemented as appropriate)   06/05/18 1410   Fall Risk (Adult)   Absence of Fall making progress toward outcome       Problem: Skin Injury Risk (Adult)  Goal: Skin Health and Integrity  Outcome: Ongoing (interventions implemented as appropriate)   06/05/18 1410   Skin Injury Risk (Adult)   Skin Health and Integrity making  progress toward outcome       Problem: Infection, Risk/Actual (Adult)  Goal: Infection Prevention/Resolution  Outcome: Ongoing (interventions implemented as appropriate)   06/05/18 1410   Infection, Risk/Actual (Adult)   Infection Prevention/Resolution making progress toward outcome

## 2018-06-05 NOTE — PLAN OF CARE
Problem: Patient Care Overview  Goal: Plan of Care Review  Outcome: Ongoing (interventions implemented as appropriate)   06/05/18 1611   Coping/Psychosocial   Plan of Care Reviewed With daughter   Plan of Care Review   Progress no change   OTHER   Outcome Summary Initial eval. Pt's daughter reports good appetite today, diet advanced from NPO to Soft/CCHO. Encouraged intake, pt dislikes supplements. Will continue to monitor intake and follow up.

## 2018-06-05 NOTE — PLAN OF CARE
Problem: Patient Care Overview  Goal: Plan of Care Review  Outcome: Ongoing (interventions implemented as appropriate)   06/05/18 9615   Coping/Psychosocial   Plan of Care Reviewed With patient;daughter   Plan of Care Review   Progress no change   OTHER   Outcome Summary OT eval completed. Pt oriented to person and place. Disoriented to time and situation. Able to follow 1 step commands about 75% of the time with cueing. She was intermittently lethargic during eval and required encouragement to participate at times. Came to EOB with modA, returned to supine with Liliam. Rolled L and R three times with min-modA needed. Pt was unable to transfer or ambulate due to weakness and inability to remain alert throughout eval. Pt was dependent for all toileting and hygiene. She is typically independent at baseline and lives alone. She plans to return to SNF at discharge for additoinal rehab. OT agrees with this plan. OT will continue to work with pt to increase her strength, endurance, balance, and safety with ADL.

## 2018-06-06 ENCOUNTER — APPOINTMENT (OUTPATIENT)
Dept: GENERAL RADIOLOGY | Facility: HOSPITAL | Age: 73
End: 2018-06-06

## 2018-06-06 LAB
ALBUMIN SERPL-MCNC: 3.4 G/DL (ref 3.5–5)
ALBUMIN/GLOB SERPL: 0.9 G/DL (ref 1.1–2.5)
ALP SERPL-CCNC: 186 U/L (ref 24–120)
ALT SERPL W P-5'-P-CCNC: 23 U/L (ref 0–54)
ANION GAP SERPL CALCULATED.3IONS-SCNC: 10 MMOL/L (ref 4–13)
AST SERPL-CCNC: 135 U/L (ref 7–45)
BACTERIA SPEC AEROBE CULT: ABNORMAL
BILIRUB SERPL-MCNC: 0.7 MG/DL (ref 0.1–1)
BUN BLD-MCNC: 19 MG/DL (ref 5–21)
BUN/CREAT SERPL: 15.1 (ref 7–25)
CALCIUM SPEC-SCNC: 10.1 MG/DL (ref 8.4–10.4)
CHLORIDE SERPL-SCNC: 112 MMOL/L (ref 98–110)
CO2 SERPL-SCNC: 22 MMOL/L (ref 24–31)
CREAT BLD-MCNC: 1.26 MG/DL (ref 0.5–1.4)
DEPRECATED RDW RBC AUTO: 52.2 FL (ref 40–54)
EOSINOPHIL # BLD MANUAL: 0.57 10*3/MM3 (ref 0–0.7)
EOSINOPHIL NFR BLD MANUAL: 2 % (ref 0–4)
EOSINOPHIL SPEC QL WRIGHT STN: 1 %
ERYTHROCYTE [DISTWIDTH] IN BLOOD BY AUTOMATED COUNT: 16.5 % (ref 12–15)
GFR SERPL CREATININE-BSD FRML MDRD: 42 ML/MIN/1.73
GLOBULIN UR ELPH-MCNC: 3.6 GM/DL
GLUCOSE BLD-MCNC: 118 MG/DL (ref 70–100)
GLUCOSE BLDC GLUCOMTR-MCNC: 112 MG/DL (ref 70–130)
GLUCOSE BLDC GLUCOMTR-MCNC: 133 MG/DL (ref 70–130)
GLUCOSE BLDC GLUCOMTR-MCNC: 139 MG/DL (ref 70–130)
GLUCOSE BLDC GLUCOMTR-MCNC: 155 MG/DL (ref 70–130)
HCT VFR BLD AUTO: 37.9 % (ref 37–47)
HGB BLD-MCNC: 12 G/DL (ref 12–16)
LYMPHOCYTES # BLD MANUAL: 11.77 10*3/MM3 (ref 0.72–4.86)
LYMPHOCYTES NFR BLD MANUAL: 4 % (ref 4–12)
LYMPHOCYTES NFR BLD MANUAL: 41 % (ref 15–45)
MCH RBC QN AUTO: 27.8 PG (ref 28–32)
MCHC RBC AUTO-ENTMCNC: 31.7 G/DL (ref 33–36)
MCV RBC AUTO: 87.7 FL (ref 82–98)
MONOCYTES # BLD AUTO: 1.15 10*3/MM3 (ref 0.19–1.3)
NEUTROPHILS # BLD AUTO: 13.49 10*3/MM3 (ref 1.87–8.4)
NEUTROPHILS NFR BLD MANUAL: 47 % (ref 39–78)
PLATELET # BLD AUTO: 538 10*3/MM3 (ref 130–400)
PMV BLD AUTO: 10.8 FL (ref 6–12)
POTASSIUM BLD-SCNC: 4 MMOL/L (ref 3.5–5.3)
PROT SERPL-MCNC: 7 G/DL (ref 6.3–8.7)
RBC # BLD AUTO: 4.32 10*6/MM3 (ref 4.2–5.4)
SMALL PLATELETS BLD QL SMEAR: ABNORMAL
SODIUM BLD-SCNC: 144 MMOL/L (ref 135–145)
TARGETS BLD QL SMEAR: ABNORMAL
VARIANT LYMPHS NFR BLD MANUAL: 6 % (ref 0–5)
WBC MORPH BLD: NORMAL
WBC NRBC COR # BLD: 28.7 10*3/MM3 (ref 4.8–10.8)

## 2018-06-06 PROCEDURE — G9175 SPEECH LANG GOAL STATUS: HCPCS

## 2018-06-06 PROCEDURE — 87040 BLOOD CULTURE FOR BACTERIA: CPT | Performed by: NURSE PRACTITIONER

## 2018-06-06 PROCEDURE — 80053 COMPREHEN METABOLIC PANEL: CPT | Performed by: NURSE PRACTITIONER

## 2018-06-06 PROCEDURE — 63710000001 DIPHENHYDRAMINE PER 50 MG: Performed by: NURSE PRACTITIONER

## 2018-06-06 PROCEDURE — 85025 COMPLETE CBC W/AUTO DIFF WBC: CPT | Performed by: NURSE PRACTITIONER

## 2018-06-06 PROCEDURE — 92523 SPEECH SOUND LANG COMPREHEN: CPT

## 2018-06-06 PROCEDURE — 94799 UNLISTED PULMONARY SVC/PX: CPT

## 2018-06-06 PROCEDURE — G9174 SPEECH LANG CURRENT STATUS: HCPCS

## 2018-06-06 PROCEDURE — 85007 BL SMEAR W/DIFF WBC COUNT: CPT | Performed by: NURSE PRACTITIONER

## 2018-06-06 PROCEDURE — 71045 X-RAY EXAM CHEST 1 VIEW: CPT

## 2018-06-06 PROCEDURE — 97110 THERAPEUTIC EXERCISES: CPT

## 2018-06-06 PROCEDURE — G9176 SPEECH LANG D/C STATUS: HCPCS

## 2018-06-06 PROCEDURE — 25010000002 HEPARIN (PORCINE) PER 1000 UNITS: Performed by: FAMILY MEDICINE

## 2018-06-06 PROCEDURE — 82962 GLUCOSE BLOOD TEST: CPT

## 2018-06-06 PROCEDURE — 25010000002 VANCOMYCIN 10 G RECONSTITUTED SOLUTION: Performed by: FAMILY MEDICINE

## 2018-06-06 RX ORDER — DIPHENHYDRAMINE HCL 25 MG
25 CAPSULE ORAL EVERY 6 HOURS PRN
Status: DISCONTINUED | OUTPATIENT
Start: 2018-06-06 | End: 2018-06-07

## 2018-06-06 RX ORDER — SACCHAROMYCES BOULARDII 250 MG
500 CAPSULE ORAL 2 TIMES DAILY
Status: DISCONTINUED | OUTPATIENT
Start: 2018-06-06 | End: 2018-06-12 | Stop reason: HOSPADM

## 2018-06-06 RX ORDER — LACTULOSE 20 G/30ML
20 SOLUTION ORAL ONCE
Status: COMPLETED | OUTPATIENT
Start: 2018-06-06 | End: 2018-06-06

## 2018-06-06 RX ORDER — IPRATROPIUM BROMIDE AND ALBUTEROL SULFATE 2.5; .5 MG/3ML; MG/3ML
3 SOLUTION RESPIRATORY (INHALATION) EVERY 6 HOURS PRN
Status: DISCONTINUED | OUTPATIENT
Start: 2018-06-06 | End: 2018-06-12 | Stop reason: HOSPADM

## 2018-06-06 RX ORDER — TRAMADOL HYDROCHLORIDE 50 MG/1
25 TABLET ORAL EVERY 6 HOURS PRN
Status: DISCONTINUED | OUTPATIENT
Start: 2018-06-06 | End: 2018-06-12 | Stop reason: HOSPADM

## 2018-06-06 RX ADMIN — HEPARIN SODIUM 5000 UNITS: 5000 INJECTION INTRAVENOUS; SUBCUTANEOUS at 06:44

## 2018-06-06 RX ADMIN — VANCOMYCIN HYDROCHLORIDE 125 MG: KIT at 06:45

## 2018-06-06 RX ADMIN — IPRATROPIUM BROMIDE AND ALBUTEROL SULFATE 3 ML: 2.5; .5 SOLUTION RESPIRATORY (INHALATION) at 06:21

## 2018-06-06 RX ADMIN — Medication 250 MG: at 09:59

## 2018-06-06 RX ADMIN — VANCOMYCIN HYDROCHLORIDE 125 MG: KIT at 11:58

## 2018-06-06 RX ADMIN — VANCOMYCIN HYDROCHLORIDE 1250 MG: 10 INJECTION, POWDER, LYOPHILIZED, FOR SOLUTION INTRAVENOUS at 22:08

## 2018-06-06 RX ADMIN — AZTREONAM 1000 MG: 1 INJECTION, POWDER, LYOPHILIZED, FOR SOLUTION INTRAMUSCULAR; INTRAVENOUS at 18:50

## 2018-06-06 RX ADMIN — SODIUM CHLORIDE 50 ML/HR: 9 INJECTION, SOLUTION INTRAVENOUS at 19:56

## 2018-06-06 RX ADMIN — AZTREONAM 1000 MG: 1 INJECTION, POWDER, LYOPHILIZED, FOR SOLUTION INTRAMUSCULAR; INTRAVENOUS at 02:42

## 2018-06-06 RX ADMIN — IPRATROPIUM BROMIDE AND ALBUTEROL SULFATE 3 ML: 2.5; .5 SOLUTION RESPIRATORY (INHALATION) at 14:20

## 2018-06-06 RX ADMIN — FAMOTIDINE 20 MG: 20 TABLET, FILM COATED ORAL at 09:59

## 2018-06-06 RX ADMIN — ESCITALOPRAM 20 MG: 10 TABLET, FILM COATED ORAL at 09:59

## 2018-06-06 RX ADMIN — AZTREONAM 1000 MG: 1 INJECTION, POWDER, LYOPHILIZED, FOR SOLUTION INTRAMUSCULAR; INTRAVENOUS at 11:23

## 2018-06-06 RX ADMIN — TRAMADOL HYDROCHLORIDE 25 MG: 50 TABLET, COATED ORAL at 17:31

## 2018-06-06 RX ADMIN — SODIUM CHLORIDE 100 ML/HR: 9 INJECTION, SOLUTION INTRAVENOUS at 06:45

## 2018-06-06 RX ADMIN — DIPHENHYDRAMINE HYDROCHLORIDE 25 MG: 25 CAPSULE ORAL at 11:58

## 2018-06-06 RX ADMIN — VANCOMYCIN HYDROCHLORIDE 125 MG: KIT at 17:32

## 2018-06-06 RX ADMIN — LACTULOSE 20 G: 20 SOLUTION ORAL at 11:58

## 2018-06-06 RX ADMIN — Medication 500 MG: at 20:46

## 2018-06-06 RX ADMIN — ATORVASTATIN CALCIUM 20 MG: 10 TABLET, FILM COATED ORAL at 09:59

## 2018-06-06 RX ADMIN — VANCOMYCIN HYDROCHLORIDE 125 MG: KIT at 00:19

## 2018-06-06 RX ADMIN — DONEPEZIL HYDROCHLORIDE 10 MG: 10 TABLET, FILM COATED ORAL at 20:09

## 2018-06-06 RX ADMIN — HEPARIN SODIUM 5000 UNITS: 5000 INJECTION INTRAVENOUS; SUBCUTANEOUS at 17:31

## 2018-06-06 RX ADMIN — AMLODIPINE BESYLATE 5 MG: 5 TABLET ORAL at 09:59

## 2018-06-06 RX ADMIN — MEMANTINE HYDROCHLORIDE 10 MG: 5 TABLET, FILM COATED ORAL at 20:09

## 2018-06-06 RX ADMIN — MEGESTROL ACETATE 800 MG: 40 SUSPENSION ORAL at 09:59

## 2018-06-06 RX ADMIN — MEMANTINE HYDROCHLORIDE 10 MG: 5 TABLET, FILM COATED ORAL at 09:59

## 2018-06-06 RX ADMIN — IPRATROPIUM BROMIDE AND ALBUTEROL SULFATE 3 ML: 2.5; .5 SOLUTION RESPIRATORY (INHALATION) at 10:23

## 2018-06-06 NOTE — PLAN OF CARE
Problem: Patient Care Overview  Goal: Plan of Care Review  Outcome: Ongoing (interventions implemented as appropriate)   06/06/18 5045   Coping/Psychosocial   Plan of Care Reviewed With patient;family   OTHER   Outcome Summary Pt. unable to participate today sec. confusion and weakness, pt. seen 2x this date with this flynn/l getting a little ot tx with ea. attempt!

## 2018-06-06 NOTE — PLAN OF CARE
"Problem: Patient Care Overview  Goal: Plan of Care Review  Outcome: Ongoing (interventions implemented as appropriate)   06/06/18 1022   Coping/Psychosocial   Plan of Care Reviewed With patient   OTHER   Outcome Summary Cognitive-linguistic evaluation completed. Pt was fatigued throughout assessment. Pt would respond \"I don't know,\" or \"I just can't think\" frequently throughout the evaluation. Pt was oriented to name, but was unable to state place, day of the week, month of the year, date, and the year. Pt was able to identify objects in the room and had appropriate responses to phrase and sentence completion tasks. Pt had difficulty formulating a sentence when given a target word. Pt had difficulty following directions and anwering yes/no questions that included body parts. Pt was able to state how items were similar; however pt was unable to present how they were different. Analogies, multiple meaning, problem solving, reasoning, divergent, and convergent tasks were extremely difficult for this pt to complete. SLP provided maxium cues and pt was still unable to provide the correct answers. Pt perseverated on words during the analogy section. Although pt demonstrated difficulty in the area of language, pt's primary weakness is seen in the area of cognition. It is recommended that pt receive tx for cognitive-linguistic deficits. SLP will follow and treat pt until discharge.         "

## 2018-06-06 NOTE — PLAN OF CARE
Problem: Patient Care Overview  Goal: Plan of Care Review  Outcome: Ongoing (interventions implemented as appropriate)   06/06/18 0345   Coping/Psychosocial   Plan of Care Reviewed With patient   Plan of Care Review   Progress no change   OTHER   Outcome Summary patient denies pain, voiding, BM this shift, will cont to monitor.        Problem: Fall Risk (Adult)  Goal: Absence of Fall  Outcome: Ongoing (interventions implemented as appropriate)   06/06/18 0345   Fall Risk (Adult)   Absence of Fall making progress toward outcome       Problem: Skin Injury Risk (Adult)  Goal: Skin Health and Integrity  Outcome: Ongoing (interventions implemented as appropriate)   06/06/18 0345   Skin Injury Risk (Adult)   Skin Health and Integrity making progress toward outcome       Problem: Infection, Risk/Actual (Adult)  Goal: Infection Prevention/Resolution  Outcome: Ongoing (interventions implemented as appropriate)   06/06/18 0345   Infection, Risk/Actual (Adult)   Infection Prevention/Resolution making progress toward outcome

## 2018-06-06 NOTE — PLAN OF CARE
Problem: Patient Care Overview  Goal: Plan of Care Review  Outcome: Ongoing (interventions implemented as appropriate)   06/06/18 5607   Coping/Psychosocial   Plan of Care Reviewed With patient;daughter   Plan of Care Review   Progress no change   OTHER   Outcome Summary continues to have soft brown stools. lactulose given x 1. ultram given x 1 for c/o pain.      Goal: Individualization and Mutuality  Outcome: Ongoing (interventions implemented as appropriate)    Goal: Discharge Needs Assessment  Outcome: Ongoing (interventions implemented as appropriate)      Problem: Fall Risk (Adult)  Goal: Absence of Fall  Outcome: Ongoing (interventions implemented as appropriate)      Problem: Skin Injury Risk (Adult)  Goal: Skin Health and Integrity  Outcome: Ongoing (interventions implemented as appropriate)      Problem: Infection, Risk/Actual (Adult)  Goal: Infection Prevention/Resolution  Outcome: Ongoing (interventions implemented as appropriate)

## 2018-06-06 NOTE — PROGRESS NOTES
"Pharmacy Dosing Service  Pharmacokinetics  Vancomycin Follow-up Evaluation    Assessment/Action/Plan:  SCr = 1.26 (down from 1.59).  Vancomycin dose adjusted to 1250mg iv q24h. No levels ordered at this time.  Pharmacy will continue to monitor renal function and adjust dose accordingly.     Subjective:  Bindu Fountain is a 73 y.o. female currently on Vancomycin for the treatment of pneumonia, day 3 of therapy.    Objective:  Ht: 167.6 cm (65.98\"); Wt: 89.9 kg (198 lb 1.6 oz)  Estimated Creatinine Clearance: 44.2 mL/min (by C-G formula based on SCr of 1.26 mg/dL).   Lab Results   Component Value Date    CREATININE 1.26 06/06/2018    CREATININE 1.59 (H) 06/05/2018    CREATININE 1.62 (H) 06/04/2018      Lab Results   Component Value Date    WBC 28.70 (H) 06/06/2018    WBC 46.97 (C) 06/05/2018    WBC 29.80 (H) 06/04/2018       No results found for: VANCOPEAK, VANCOTROUGH, VANCORANDOM    Culture Results:  Microbiology Results (last 10 days)       Procedure Component Value - Date/Time    S. Pneumo Ag Urine or CSF - Urine, Urine, Clean Catch [[id number]]  (Normal) Collected:  06/05/18 0816    Lab Status:  Final result Specimen:  Urine from Urine, Clean Catch Updated:  06/05/18 1259     Strep Pneumo Ag Negative    Legionella Antigen, Urine - Urine, Urine, Clean Catch [[id number]]  (Normal) Collected:  06/05/18 0816    Lab Status:  Final result Specimen:  Urine from Urine, Clean Catch Updated:  06/05/18 1259     LEGIONELLA ANTIGEN, URINE Negative    Blood Culture - Blood, Blood, Venous Line [[id number]]  (Normal) Collected:  06/04/18 2147    Lab Status:  Preliminary result Specimen:  Blood from Arm, Right Updated:  06/05/18 2215     Blood Culture No growth at 24 hours    Blood Culture - Blood, Blood, Venous Line [[id number]]  (Abnormal) Collected:  06/04/18 2145    Lab Status:  Preliminary result Specimen:  Blood from Hand, Left Updated:  06/06/18 0837     Blood Culture Abnormal Stain (A)      Staphylococcus, coagulase negative " (A)     Isolated from Aerobic Bottle     Gram Stain Result Gram positive cocci in clusters    Narrative:       Probable Contaminant  No growth in anaerobic bottle.    Blood Culture ID, PCR - Blood, [[id number]]  (Abnormal) Collected:  06/04/18 2145    Lab Status:  Final result Specimen:  Blood from Hand, Left Updated:  06/05/18 1946     BCID, PCR Staphylococcus spp, not aureus. Identification by BCID PCR. (C)    Urine Culture - Urine, [[id number]]  (Abnormal)  (Susceptibility) Collected:  06/04/18 2028    Lab Status:  Final result Specimen:  Urine from Urine, Catheter Updated:  06/06/18 0640     Urine Culture >100,000 CFU/mL Klebsiella pneumoniae ssp pneumoniae (A)    Narrative:       Cefazolin results may be used to predict the potential effectiveness of oral cephalosporins for treating uncomplicated urinary tract infections.    Susceptibility        Klebsiella pneumoniae ssp pneumoniae     CLARIBEL     Ampicillin >=32 ug/ml Resistant     Ampicillin + Sulbactam 4 ug/ml Susceptible     Cefazolin <=4 ug/ml Susceptible     Cefepime <=1 ug/ml Susceptible     Ceftriaxone <=1 ug/ml Susceptible     Ertapenem <=0.5 ug/ml Susceptible     ESBL Confirmation Test NEG  Negative     Gentamicin <=1 ug/ml Susceptible     Levofloxacin >=8 ug/ml Resistant     Meropenem <=0.25 ug/ml Susceptible     Nitrofurantoin 128 ug/ml Resistant     Piperacillin + Tazobactam <=4 ug/ml Susceptible     Trimethoprim + Sulfamethoxazole <=20 ug/ml Susceptible                              Viktor Plascencia RPH   06/06/18 1:27 PM

## 2018-06-06 NOTE — THERAPY EVALUATION
"Acute Care - Speech Language Pathology Initial Evaluation  [facility]     Patient Name: Bindu Fountain  : 1945  MRN: [id number]  Today's Date: 2018  Onset of Illness/Injury or Date of Surgery: 18     Referring Physician: Ara BUSTOS      Admit Date: 2018   Cognitive-linguistic evaluation completed. Pt was fatigued throughout assessment. Pt would respond \"I don't know,\" or \"I just can't think\" frequently throughout the evaluation. Pt was oriented to name, but was unable to state place, day of the week, month of the year, date, and the year. Pt was able to identify objects in the room and had appropriate responses to phrase and sentence completion tasks. Pt had difficulty formulating a sentence when given a target word. Pt had difficulty following directions and anwering yes/no questions that included body parts. Pt was able to state how items were similar; however pt was unable to present how they were different. Analogies, multiple meaning, problem solving, reasoning, divergent, and convergent tasks were extremely difficult for this pt to complete. SLP provided maxium cues and pt was still unable to provide the correct answers. Pt perseverated on words during the analogy section. Although pt demonstrated difficulty in the area of language, pt's primary weakness is seen in the area of cognition. It is recommended that pt receive tx for cognitive-linguistic deficits. SLP will follow and treat pt until discharge.  Susanne Miller, Speech Therapy Student 2018 12:18 PM  Visit Dx:    ICD-10-CM ICD-9-CM   1. Urinary tract infection without hematuria, site unspecified N39.0 599.0   2. Pneumonia due to infectious organism, unspecified laterality, unspecified part of lung J18.9 136.9     484.8   3. Altered mental status, unspecified altered mental status type R41.82 780.97   4. Clostridium difficile infection B96.89 041.84   5. Oral phase dysphagia R13.11 787.21   6. Decreased activities of " daily living (ADL) Z78.9 V49.89   7. Impaired cognition R41.89 294.9     Patient Active Problem List   Diagnosis   • Thrombocytosis after splenectomy   • Urinary tract infection without hematuria   • Chronic cystitis   • Renal cyst   • Pneumonia symptoms     Past Medical History:   Diagnosis Date   • Cancer     liver   • Dementia    • Diabetes    • Dysphagia    • High cholesterol    • Hypertension    • Laryngopharyngeal reflux    • Lingual tonsil hypertrophy    • Thyroid nodule      Past Surgical History:   Procedure Laterality Date   • APPENDECTOMY     • CHOLECYSTECTOMY     • FINE NEEDLE ASPIRATION     • KNEE SURGERY     • SPLENECTOMY     • TONSILLECTOMY            SLP EVALUATION (last 72 hours)      SLP SLC Evaluation     Row Name 06/06/18 0923                   Communication Assessment/Intervention    Document Type (P)  evaluation  -        Patient Observations (P)  --   fatigued   -        Patient Effort (P)  fair  -        Symptoms Noted During/After Treatment (P)  fatigue  -           General Information    Patient Profile Reviewed (P)  yes  -        Pertinent History Of Current Problem (P)  Pt has hx of AMS, liver cancer, c-diff, pneumonia, UTI, dementia, DM, HTN, dysphagia, GERD, lingual tonsil hypertrophy, and tonsillectomy.   -        Precautions/Limitations, Vision (P)  WFL with corrective lenses  -        Precautions/Limitations, Hearing (P)  WFL  -        Prior Level of Function-Communication (P)  cognitive-linguistic impairment  -        Plans/Goals Discussed with (P)  patient  -        Barriers to Rehab (P)  --   fatigued   -        Patient's Goals for Discharge (P)  patient did not state  -           Pain Assessment    Additional Documentation (P)  Pain Scale: FACES Pre/Post-Treatment (Group)  -           Pain Scale: FACES Pre/Post-Treatment    Pain: FACES Scale, Pretreatment (P)  0-->no hurt  -        Pain: FACES Scale, Post-Treatment (P)  0-->no hurt  -            Comprehension Assessment/Intervention    Comprehension Assessment/Intervention (P)  Auditory Comprehension  -           Auditory Comprehension Assessment/Intervention    Auditory Comprehension (Communication) (P)  moderate impairment;severe impairment  -        Able to Identify Objects/Pictures (Communication) (P)  body part;familiar objects;mild impairment   mixed up body parts in different tasks throughout eval  -        Answers Questions (Communication) (P)  yes/no;wh questions;moderate impairment;severe impairment  -        Able to Follow Commands (Communication) (P)  1-step;WFL;2-step;moderate impairment  -        Successful Auditory Strategies (Communication) (P)  phonemic cues;semantic cues;repetition  -           Expression Assessment/Intervention    Expression Assessment/Intervention (P)  verbal expression  -           Verbal Expression Assessment/Intervention    Verbal Expression (P)  moderate impairment;severe impairment  -        Automatic Speech (Communication) (P)  counting 1-20;days of week;months of year;moderate impairment  -        Repetition (P)  words;moderate impairment   words and numbers   -        Phrase Completion (P)  automatic/predictable;[facility]  -        Confrontational Naming (P)  [facility]        Sentence Formulation (P)  simple;severe impairment  -        Conversational Discourse/Fluency (P)  unable/difficult to assess   d/t fatigue   -           Oral Musculature and Cranial Nerve Assessment    Oral Motor General Assessment (P)  [facility]  -           Motor Speech Assessment/Intervention    Motor Speech Function (P)  [facility]           Speaking Valve    Respiratory Status (P)  room air  -           Cognitive Assessment Intervention- SLP    Cognitive Function (Cognition) (P)  moderate impairment;severe impairment  -        Orientation Status (Cognition) (P)  person;place;time;moderate impairment;severe impairment  -        Memory (Cognitive) (P)  immediate;mild  impairment  -        Attention (Cognitive) (P)  attention to detail;moderate impairment  -        Thought Organization (Cognitive) (P)  concrete divergent;concrete convergent;moderate impairment;severe impairment  -        Reasoning (Cognitive) (P)  analogies;mental flexibility;simple;severe impairment  -        Problem Solving (Cognitive) (P)  simple;severe impairment  -        Executive Function (Cognition) (P)  moderate impairment;severe impairment  -        Pragmatics (Communication) (P)  WFL  -           SLP Clinical Impressions    SLP Diagnosis (P)  Cognitive-linguistic deficit   -        Rehab Potential/Prognosis (P)  fair  -MH        Functional Impact (P)  functional impact in social situations;functional impact in ADLs  -           Recommendations    Therapy Frequency (SLP SLC) (P)  3 days per week  -        Predicted Duration Therapy Intervention (Days) (P)  until discharge  -        Anticipated Dischage Disposition (P)  skilled nursing facility  -           Communication Treatment Objective and Progress Goals (SLP)    Auditory Comprehension Treatment Objectives (P)  Auditory Comprehension Treatment Objectives (Group)  -        Verbal Expression Treatment Objectives (P)  Verbal Expression Treatment Objectives (Group)  -        Cognitive Linguistic Treatment Objectives (P)  Cognitive Linguistic Treatment Objectives (Group)  -           Auditory Comprehension Treatment Objectives    Words/Phrases/Sentences Selection (P)  words/phrases/sentences, SLP goal 1  -        Comprehend Questions Selection (P)  comprehend questions, SLP goal 1  -        Follow Directions Selection (P)  follow directions, SLP goal 1  -           Words/Phrases/Sentences Goal 1 (SLP)    Improve Ability to Comprehend Words/Phrases/Sentences Through: Goal 1 (SLP) (P)  identify body part;independently (over 90% accuracy)  -        Time Frame (Identify Objects and Pictures Goal 1, SLP) (P)  short term  goal (STG);by discharge  -        Barriers (Identify Objects and Pictures Goal 1, SLP) (P)  fatigue  -MH        Progress/Outcomes (Identify Objects and Pictures Goal 1, SLP) (P)  goal ongoing  -           Comprehend Questions Goal 1 (SLP)    Improve Ability to Comprehend Questions Goal 1 (SLP) (P)  simple yes/no questions;independently (over 90% accuracy)  -        Time Frame (Comprehend Questions Goal 1, SLP) (P)  short term goal (STG);by discharge  -        Barriers (Comprehend Questions Goal 1, SLP) (P)  fatigue  -MH        Progress/Outcomes (Comprehend Questions Goal 1, SLP) (P)  goal ongoing  -           Follow Directions Goal 2 (SLP)    Improve Ability to Follow Directions Goal 1 (SLP) (P)  2 step commands;independently (over 90% accuracy)  -        Time Frame (Follow Directions Goal 1, SLP) (P)  short term goal (STG);by discharge  -        Barriers (Improve Ability to Follow Directions Goal 1, SLP) (P)  fatigue   -        Progress/Outcomes (Follow Directions Goal 1, SLP) (P)  goal ongoing  -           Verbal Expression Treatment Objectives    Word Retrieval Skills Selection (P)  word retrieval, SLP goal 1  -        Phrase and Sentence Level Response Selection (P)  phrase and sentence level response, SLP goal 1  -           Word Retrieval Skills Goal 1 (SLP)    Improve Word Retrieval Skills By Goal 1 (SLP) (P)  completing automatic speech task, days of the week;completing automatic speech task, months;repeating words  -        Time Frame (Word Retrieval Goal 1, SLP) (P)  short term goal (STG);by discharge  -        Barriers (Word Retrieval Goal 1, SLP) (P)  fatigue   -        Progress (Word Retrieval Skills Goal 1, SLP) (P)  with minimal cues (75-90%)  -        Progress/Outcomes (Word Retrieval Goal 1, SLP) (P)  goal ongoing  -           Ability to Construct Phrase and Sentence Level Response Goal 1 (SLP)    Improve Ability to Construct Phrase and Sentence Level Responses By  Goal 1 (SLP) (P)  constructing a sentence with a key word;with minimal cues (75-90%)  -        Time Frame (Phrase and Sentence Level Response Goal 1, SLP) (P)  short term goal (STG);by discharge  -MH        Barriers (Phrase and Sentence Level Response Goal 1, SLP) (P)  fatigue  -MH        Progress (Construct Phrase and Sentence Level Response Goal 1, SLP) (P)  with minimal cues (75-90%)  -MH        Progress/Outcomes (Phrase and Sentence Level Response Goal 1, SLP) (P)  goal ongoing  -           Cognitive Linguistic Treatment Objectives    Orientation Selection (P)  orientation, SLP goal 1  -MH        Organizational Skills Selection (P)  organizational skills, SLP goal 1  -        Reasoning Selection (P)  reasoning, SLP goal 1  -        Problem Solving Selection (P)  problem solving, SLP goal 1  -        Executive Function Skills Selection (P)  executive function skills, SLP goal 1  -           Orientation Goal 1 (SLP)    Improve Orientation Through Goal 1 (SLP) (P)  demonstrating orientation to day;demonstrating orientation to month;demonstrating orientation to place;demonstrating orientation to year;with minimal cues (75-90%)  -        Time Frame (Orientation Goal 1, SLP) (P)  short term goal (STG);by discharge  -MH        Barriers (Orientation Goal 1, SLP) (P)  fatigue  -MH        Progress/Outcomes (Orientation Goal 1, SLP) (P)  goal ongoing  -           Organizational Skills Goal 1 (SLP)    Improve Thought Organization Through Goal 1 (SLP) (P)  completing a divergent naming task;completing a convergent naming task;naming similarities and differences;with minimal cues (75-90%)  -        Time Frame (Thought Organization Skills Goal 1, SLP) (P)  short term goal (STG);by discharge  -MH        Barriers (Thought Organization Skills Goal 1, SLP) (P)  fatigue  -MH        Progress/Outcomes (Thought Organization Skills Goal 1, SLP) (P)  goal ongoing  -           Reasoning Goal 1 (SLP)    Improve  Reasoning Through Goal 1 (SLP) (P)  complete basic reasoning task;complete analogies;identify absurdities;with minimal cues (75-90%)  -        Time Frame (Reasoning Goal 1, SLP) (P)  short term goal (STG);by discharge  -        Barriers (Reasoning Goal 1, SLP) (P)  fatigue  -MH        Progress/Outcomes (Reasoning Goal 1, SLP) (P)  goal ongoing  -           Functional Problem Solving Skills Goal 1 (SLP)    Improve Problem Solving Through Goal 1 (SLP) (P)  determine solutions to simple ADL/safety problems;with minimal cues (75-90%)  -        Time Frame (Problem Solving Goal 1, SLP) (P)  short term goal (STG);by discharge  -        Barriers (Problem Solving Goal 1, SLP) (P)  fatigue  -MH        Progress/Outcomes (Problem Solving Goal 1, SLP) (P)  goal ongoing  -           Executive Functional Skills Goal 1 (SLP)    Improve Executive Function Skills Goal 1 (SLP) (P)  demonstrate awareness of deficit;organization/planning activity;with minimal cues (75-90%)  -        Time Frame (Executive Function Skills Goal 1, SLP) (P)  short term goal (STG);by discharge  -        Barriers (Executive Function Skills Goal 1, SLP) (P)  fatigue  -MH        Progress/Outcomes (Executive Function Skills Goal 1, SLP) (P)  goal ongoing  -          User Key  (r) = Recorded By, (t) = Taken By, (c) = Cosigned By    Initials Name Effective Dates     Susanne Miller, Speech Therapy Student 05/04/18 -                EDUCATION  The patient has been educated in the following areas:     Cognitive Impairment.    SLP Recommendation and Plan    SLP Diagnosis: (P) Cognitive-linguistic deficit     Rehab Potential/Prognosis: (P) fair         Anticipated Dischage Disposition: (P) skilled nursing facility              Predicted Duration Therapy Intervention (Days): (P) until discharge          Plan of Care Review  Plan of Care Reviewed With: (P) patient  Plan of Care Reviewed With: (P) patient  Outcome Summary: (P) Cognitive-linguistic  "evaluation completed. Pt was fatigued throughout assessment. Pt would respond \"I don't know,\" or \"I just can't think\" frequently throughout the evaluation. Pt was oriented to name, but was unable to state place, day of the week, month of the year, date, and the year. Pt was able to identify objects in the room and had appropriate responses to phrase and sentence completion tasks. Pt had difficulty formulating a sentence when given a target word. Pt had difficulty following directions and anwering yes/no questions that included body parts. Pt was able to state how items were similar; however pt was unable to present how they were different. Analogies, multiple meaning, problem solving, reasoning, divergent, and convergent tasks were extremely difficult for this pt to complete. SLP provided maxium cues and pt was still unable to provide the correct answers. Pt perseverated on words during the analogy section. Although pt demonstrated difficulty in the area of language, pt's primary weakness is seen in the area of cognition. It is recommended that pt receive tx for cognitive-linguistic deficits. SLP will follow and treat pt until discharge.              SLP GOALS     Row Name 06/06/18 0923 06/05/18 0951          Oral Nutrition/Hydration Goal 1 (SLP)    Oral Nutrition/Hydration Goal 1, SLP  -- Pt will tolerate least restrictive diet with no overt s/s of aspiration.  -MB     Time Frame (Oral Nutrition/Hydration Goal 1, SLP)  -- by discharge  -MB     Barriers (Oral Nutrition/Hydration Goal 1, SLP)  -- n/a  -MB     Progress/Outcomes (Oral Nutrition/Hydration Goal 1, SLP)  -- goal ongoing  -MB        Words/Phrases/Sentences Goal 1 (SLP)    Improve Ability to Comprehend Words/Phrases/Sentences Through: Goal 1 (SLP) (P)  identify body part;independently (over 90% accuracy)  -  --     Time Frame (Identify Objects and Pictures Goal 1, SLP) (P)  short term goal (STG);by discharge  -  --     Barriers (Identify Objects and " Pictures Goal 1, SLP) (P)  fatigue  -  --     Progress/Outcomes (Identify Objects and Pictures Goal 1, SLP) (P)  goal ongoing  -  --        Comprehend Questions Goal 1 (SLP)    Improve Ability to Comprehend Questions Goal 1 (SLP) (P)  simple yes/no questions;independently (over 90% accuracy)  -  --     Time Frame (Comprehend Questions Goal 1, SLP) (P)  short term goal (STG);by discharge  -  --     Barriers (Comprehend Questions Goal 1, SLP) (P)  fatigue  -  --     Progress/Outcomes (Comprehend Questions Goal 1, SLP) (P)  goal ongoing  -  --        Follow Directions Goal 2 (SLP)    Improve Ability to Follow Directions Goal 1 (SLP) (P)  2 step commands;independently (over 90% accuracy)  -  --     Time Frame (Follow Directions Goal 1, SLP) (P)  short term goal (STG);by discharge  -  --     Barriers (Improve Ability to Follow Directions Goal 1, SLP) (P)  fatigue   -  --     Progress/Outcomes (Follow Directions Goal 1, SLP) (P)  goal ongoing  -  --        Word Retrieval Skills Goal 1 (SLP)    Improve Word Retrieval Skills By Goal 1 (SLP) (P)  completing automatic speech task, days of the week;completing automatic speech task, months;repeating words  -  --     Time Frame (Word Retrieval Goal 1, SLP) (P)  short term goal (STG);by discharge  -  --     Barriers (Word Retrieval Goal 1, SLP) (P)  fatigue   -  --     Progress (Word Retrieval Skills Goal 1, SLP) (P)  with minimal cues (75-90%)  -  --     Progress/Outcomes (Word Retrieval Goal 1, SLP) (P)  goal ongoing  -  --        Ability to Construct Phrase and Sentence Level Response Goal 1 (SLP)    Improve Ability to Construct Phrase and Sentence Level Responses By Goal 1 (SLP) (P)  constructing a sentence with a key word;with minimal cues (75-90%)  -  --     Time Frame (Phrase and Sentence Level Response Goal 1, SLP) (P)  short term goal (STG);by discharge  -  --     Barriers (Phrase and Sentence Level Response Goal 1, SLP) (P)  fatigue   -  --     Progress (Construct Phrase and Sentence Level Response Goal 1, SLP) (P)  with minimal cues (75-90%)  -  --     Progress/Outcomes (Phrase and Sentence Level Response Goal 1, SLP) (P)  goal ongoing  -  --        Orientation Goal 1 (SLP)    Improve Orientation Through Goal 1 (SLP) (P)  demonstrating orientation to day;demonstrating orientation to month;demonstrating orientation to place;demonstrating orientation to year;with minimal cues (75-90%)  -  --     Time Frame (Orientation Goal 1, SLP) (P)  short term goal (STG);by discharge  -  --     Barriers (Orientation Goal 1, SLP) (P)  fatigue  -MH  --     Progress/Outcomes (Orientation Goal 1, SLP) (P)  goal ongoing  -  --        Organizational Skills Goal 1 (SLP)    Improve Thought Organization Through Goal 1 (SLP) (P)  completing a divergent naming task;completing a convergent naming task;naming similarities and differences;with minimal cues (75-90%)  -  --     Time Frame (Thought Organization Skills Goal 1, SLP) (P)  short term goal (STG);by discharge  -  --     Barriers (Thought Organization Skills Goal 1, SLP) (P)  fatigue  -  --     Progress/Outcomes (Thought Organization Skills Goal 1, SLP) (P)  goal ongoing  -  --        Reasoning Goal 1 (SLP)    Improve Reasoning Through Goal 1 (SLP) (P)  complete basic reasoning task;complete analogies;identify absurdities;with minimal cues (75-90%)  -  --     Time Frame (Reasoning Goal 1, SLP) (P)  short term goal (STG);by discharge  -  --     Barriers (Reasoning Goal 1, SLP) (P)  fatigue  -  --     Progress/Outcomes (Reasoning Goal 1, SLP) (P)  goal ongoing  -  --        Functional Problem Solving Skills Goal 1 (SLP)    Improve Problem Solving Through Goal 1 (SLP) (P)  determine solutions to simple ADL/safety problems;with minimal cues (75-90%)  -  --     Time Frame (Problem Solving Goal 1, SLP) (P)  short term goal (STG);by discharge  -  --     Barriers (Problem Solving Goal 1, SLP)  (P)  fatigue  -  --     Progress/Outcomes (Problem Solving Goal 1, SLP) (P)  goal ongoing  -  --        Executive Functional Skills Goal 1 (SLP)    Improve Executive Function Skills Goal 1 (SLP) (P)  demonstrate awareness of deficit;organization/planning activity;with minimal cues (75-90%)  -  --     Time Frame (Executive Function Skills Goal 1, SLP) (P)  short term goal (STG);by discharge  -  --     Barriers (Executive Function Skills Goal 1, SLP) (P)  fatigue  -  --     Progress/Outcomes (Executive Function Skills Goal 1, SLP) (P)  goal ongoing  -  --       User Key  (r) = Recorded By, (t) = Taken By, (c) = Cosigned By    Initials Name Provider Type    MERRY Sanders CCC-SLP Speech and Language Pathologist     Susanne Miller, Speech Therapy Student Speech Therapy Student                 SLP Outcome Measures (last 72 hours)      SLP Outcome Measures     Row Name 06/06/18 1200 06/06/18 1000 06/05/18 1000       SLP Outcome Measures    Outcome Measure Used? (P)  Adult NOMS  - (P)  --  - Adult NOMS  -MB       FCM Scores    FCM Chosen (P)  Problem Solving  - (P)  --  - Swallowing  -MB    Swallowing FCM Score  --  -- 5  -MB    Problem Solving FCM Score (P)  3  - (P)  --  -  --      User Key  (r) = Recorded By, (t) = Taken By, (c) = Cosigned By    Initials Name Effective Dates    MERRY Sanders CCC-SLP 08/02/16 -      Susanne Miller, Speech Therapy Student 05/04/18 -               Time Calculation:           Time Calculation- SLP     Row Name 06/06/18 1040             Time Calculation- [facility]    SLP Start Time (P)  0923  -      SLP Stop Time (P)  1040  -      SLP Time Calculation (min) (P)  77 min  -      SLP Received On (P)  06/06/18  -      SLP Goal Re-Cert Due Date (P)  06/16/18  -        User Key  (r) = Recorded By, (t) = Taken By, (c) = Cosigned By    Initials Name Provider Type     Susanne Miller, Speech Therapy Student Speech Therapy Student          Therapy  Charges for Today     Code Description Service Date Service Provider Modifiers Qty    [id number] HC ST EVAL SPEECH AND PROD W LANG  5 6/6/2018 Susanne Miller, Speech Therapy Student GN, KX 1             ADULT NOMS (last 72 hours)      Adult NOMS     Row Name 06/06/18 1000 06/05/18 1000                FCM Scores    FCM Chosen (P)  --  - Swallowing  -MB       Swallowing FCM Score  -- 5  -MB       Problem Solving FCM Score (P)  --  -  --         User Key  (r) = Recorded By, (t) = Taken By, (c) = Cosigned By    Initials Name Effective Dates    MB Jhonatan Sanders CCC-SLP 08/02/16 -      Susanne Miller, Speech Therapy Student 05/04/18 -         SLP G-Codes  SLP NOMS Used?: (P) Yes  Functional Limitations: (S) (P) Other Speech Language Pathology (problem solving)  Swallow Current Status (): At least 20 percent but less than 40 percent impaired, limited or restricted  Swallow Goal Status (): At least 20 percent but less than 40 percent impaired, limited or restricted  Other Speech-Language Pathology Functional Limitation Current Status (): (P) At least 60 percent but less than 80 percent impaired, limited or restricted  Other Speech-Language Pathology Functional Limitation Goal Status (): (P) At least 60 percent but less than 80 percent impaired, limited or restricted  Other Speech-Language Pathology Functional Limitation Discharge Status (): (P) At least 60 percent but less than 80 percent impaired, limited or restricted      Susanne Miller, Speech Therapy Student  6/6/2018

## 2018-06-06 NOTE — PROGRESS NOTES
[facility] Medicine Services  INPATIENT PROGRESS NOTE    Length of Stay: 2  Date of Admission: 6/4/2018  Primary Care Physician: Magan Lopez MD    Subjective   Chief Complaint: Follow-up altered mental status  HPI   The patient is resting in bed with family at bedside.  She tells me that she feels better compared to admission.  She denies any shortness of breath or chest pain.  She denies cough.  She denies any abdominal pain, nausea, or vomiting.  She has had 3 loose bowel movements today.  She has had complaints of generalized itching and pain today.    Review of Systems   All pertinent negatives and positives are as above. All other systems have been reviewed and are negative unless otherwise stated.     Objective    Temp:  [96.7 °F (35.9 °C)-98.4 °F (36.9 °C)] 96.7 °F (35.9 °C)  Heart Rate:  [60-78] 78  Resp:  [16-20] 20  BP: (106-154)/(49-87) 138/70  Physical Exam  Constitutional: She is oriented to person, place, and time. She appears well-developed and well-nourished. No distress.   HENT:   Head: Normocephalic and atraumatic.   Neck: Normal range of motion. Neck supple. No JVD present. No tracheal deviation present. No thyromegaly present.   Cardiovascular: Normal rate, regular rhythm, normal heart sounds and intact distal pulses.  Exam reveals no gallop and no friction rub.    No murmur heard.  Pulmonary/Chest: Effort normal and breath sounds normal. No respiratory distress. She has no wheezes. She has no rales.   Abdominal: Soft. Bowel sounds are normal. She exhibits no distention. There is tenderness (RUQ tenderness with palpation). There is no guarding.   Musculoskeletal: Normal range of motion. She exhibits no edema or tenderness.   Generalized weakness   Lymphadenopathy:     She has no cervical adenopathy.   Neurological: More alert today. She is oriented to person, place, and time. No cranial nerve deficit.    Skin: Skin is warm and dry. No rash noted. No erythema.    Psychiatric: She has a normal mood and affect.   Vitals reviewed.    Results Review:  I have reviewed the labs, radiology results, and diagnostic studies.    Laboratory Data:     Results from last 7 days  Lab Units 06/06/18  0634 06/05/18  0437 06/04/18  1856   WBC 10*3/mm3 28.70* 46.97* 29.80*   HEMOGLOBIN g/dL 12.0 12.1 13.0   HEMATOCRIT % 37.9 38.0 41.2   PLATELETS 10*3/mm3 538* 620* 659*       Results from last 7 days  Lab Units 06/06/18  0634 06/05/18  0437 06/04/18  1856   SODIUM mmol/L 144 144 141   POTASSIUM mmol/L 4.0 4.0 4.2   CHLORIDE mmol/L 112* 108 104   CO2 mmol/L 22.0* 24.0 23.0*   BUN mg/dL 19 28* 32*   CREATININE mg/dL 1.26 1.59* 1.62*   CALCIUM mg/dL 10.1 10.1 10.5*   BILIRUBIN mg/dL 0.7 0.7 0.9   ALK PHOS U/L 186* 211* 223*   ALT (SGPT) U/L 23 26 23   AST (SGOT) U/L 135* 147* 182*   GLUCOSE mg/dL 118* 117* 170*     Radiology Data:   Imaging Results (last 24 hours)     Procedure Component Value Units Date/Time    XR Chest 1 View [[id number]] Collected:  06/06/18 0745     Updated:  06/06/18 0749    Narrative:       EXAMINATION:   XR CHEST 1 VW-  6/6/2018 7:45 AM CDT     HISTORY: Left upper lobe infiltrate     Frontal upright radiograph of the chest 6/6/2018 4:15 AM CDT     COMPARISON: June 4, 2018.     FINDINGS:   Previously described left upper lobe infiltrate is resolved.. Cardiac  silhouettes moderately enlarged with no pulmonary vascular congestion..      The osseous structures and surrounding soft tissues demonstrate no acute  abnormality.       Impression:       1. Moderate cardiomegaly no acute cardiopulmonary process.        2. Previous noted left upper lobe infiltrate is resolved.        This report was finalized on 06/06/2018 07:46 by Dr. Oleg Garvey MD.        I have reviewed the patient current medications.     Assessment/Plan   Assessment:  1. Left upper lobe patchy infiltrate concerning for pneumonia- healthcare associated. Repeat chest x-ray reveals resolution of the  infiltrate  2. Urinary tract infection, urine culture with growth of Klebsiella pneumoniae  3. Acute metabolic encephalopathy- likely secondary to above  4. Acute kidney injury on chronic kidney disease- difficult to ascertain baseline kidney function.  Improved  5. Leukocytosis  6. Recent diagnosis of cholangicarcinoma  7. Clostridium Difficile colitis with recurrence  8. Dementia  9. Insulin dependent Diabetes Mellitus Type 2, hgb A1c 6.8%  10. Asplenic  11. Thrombocytosis, likely reactive. Improved    Plan:  1.  Will give one time dose of Lactulose today for elevated ammonia level. Will also add Benadryl as needed for itching  2.  CXR today reveals resolution of the left upper lobe infiltrate  3.  Urine culture reveals Klebsiella. Will continue Azactam, Day 2  4.  One set of Blood cultures revealed a coagulase-negative staph.  Labeled as a probable contaminant.  The second set of blood cultures drawn during this time have no growth.  We will continue vancomycin empirically at least for tonight, and check a blood culture with [facility]  5.  Kidney function much improved today.  Continue IV fluids, decrease rate to 50 mL per hour.  Continue to hold valsartan, Lasix, and HCTZ.  6.  Continue physical and occupational therapy  7.  Labs in AM- CBC, CMP, ammonia  8.  Ultram as needed for pain    Discharge Planning: I expect the patient to be discharged to SNF in 2-3 days.    Ara Garcia, JULI   06/06/18   2:39 PM

## 2018-06-06 NOTE — PROGRESS NOTES
Continued Stay Note   [place]     Patient Name: Bindu Fountain  MRN: [id number]  Today's Date: 6/6/2018    Admit Date: 6/4/2018          Discharge Plan     Row Name 06/06/18 1004       Plan    Plan Comments CONTACTED NEHA AT [facility] TO ADVISE OF REFERRAL AND FAXED.  WILL FOLLOW FOR EVAL AND BED OFFER.  LONG DISCUSSION WITH PT'S DTR REVEALED THAT PT. WAS ADMITTED FROM [facility] AND PT. HAD BEEN AT [facility] PRIOR TO THAT.  NEHA AT [facility] IS AWARE.                Discharge Codes    No documentation.           DAWNA Mackenzie

## 2018-06-06 NOTE — PROGRESS NOTES
Continued Stay Note  [facility]     Patient Name: Bindu Fountain  MRN: [id number]  Today's Date: 6/6/2018    Admit Date: 6/4/2018          Discharge Plan     Row Name 06/06/18 1657       Plan    Plan SKILLED [facility] IN [place]; PENDING EVAL'S AND BED OFFERS.    Patient/Family in Agreement with Plan yes    Plan Comments REC'D CALL FROM NEHA AT [facility] ADVISING THAT THEY WILL NOT OFFER PT. A BED.  CONTACTED PT'S POA, BHARATH AND SAY BORDEN TO ADVISE.  THEY ASKED ME TO  LIST PT. FOR ADMIT CONSIDERATION BACK AT [facility], [facility] AND [facility].  FAXED REFERRAL TO ALL FACILITIES AND WILL FOLLOW FOR BED OFFERS.                Discharge Codes    No documentation.           DAWNA Mackenzie

## 2018-06-07 LAB
ALBUMIN SERPL-MCNC: 3.3 G/DL (ref 3.5–5)
ALBUMIN/GLOB SERPL: 0.9 G/DL (ref 1.1–2.5)
ALP SERPL-CCNC: 177 U/L (ref 24–120)
ALT SERPL W P-5'-P-CCNC: 24 U/L (ref 0–54)
AMMONIA BLD-SCNC: 48 UMOL/L (ref 9–33)
ANION GAP SERPL CALCULATED.3IONS-SCNC: 11 MMOL/L (ref 4–13)
ANISOCYTOSIS BLD QL: ABNORMAL
AST SERPL-CCNC: 126 U/L (ref 7–45)
BASOPHILS # BLD MANUAL: 0.26 10*3/MM3 (ref 0–0.2)
BASOPHILS NFR BLD AUTO: 1 % (ref 0–2)
BILIRUB SERPL-MCNC: 0.8 MG/DL (ref 0.1–1)
BUN BLD-MCNC: 15 MG/DL (ref 5–21)
BUN/CREAT SERPL: 14.3 (ref 7–25)
CALCIUM SPEC-SCNC: 10.8 MG/DL (ref 8.4–10.4)
CHLORIDE SERPL-SCNC: 113 MMOL/L (ref 98–110)
CO2 SERPL-SCNC: 19 MMOL/L (ref 24–31)
CREAT BLD-MCNC: 1.05 MG/DL (ref 0.5–1.4)
DEPRECATED RDW RBC AUTO: 53.1 FL (ref 40–54)
EOSINOPHIL # BLD MANUAL: 0.79 10*3/MM3 (ref 0–0.7)
EOSINOPHIL NFR BLD MANUAL: 3 % (ref 0–4)
ERYTHROCYTE [DISTWIDTH] IN BLOOD BY AUTOMATED COUNT: 16.9 % (ref 12–15)
GFR SERPL CREATININE-BSD FRML MDRD: 51 ML/MIN/1.73
GIANT PLATELETS: ABNORMAL
GLOBULIN UR ELPH-MCNC: 3.8 GM/DL
GLUCOSE BLD-MCNC: 149 MG/DL (ref 70–100)
GLUCOSE BLDC GLUCOMTR-MCNC: 118 MG/DL (ref 70–130)
GLUCOSE BLDC GLUCOMTR-MCNC: 123 MG/DL (ref 70–130)
GLUCOSE BLDC GLUCOMTR-MCNC: 143 MG/DL (ref 70–130)
GLUCOSE BLDC GLUCOMTR-MCNC: 151 MG/DL (ref 70–130)
HCT VFR BLD AUTO: 38.1 % (ref 37–47)
HGB BLD-MCNC: 12.2 G/DL (ref 12–16)
LYMPHOCYTES # BLD MANUAL: 9.04 10*3/MM3 (ref 0.72–4.86)
LYMPHOCYTES NFR BLD MANUAL: 34.3 % (ref 15–45)
LYMPHOCYTES NFR BLD MANUAL: 5.1 % (ref 4–12)
MACROCYTES BLD QL SMEAR: ABNORMAL
MCH RBC QN AUTO: 28.4 PG (ref 28–32)
MCHC RBC AUTO-ENTMCNC: 32 G/DL (ref 33–36)
MCV RBC AUTO: 88.6 FL (ref 82–98)
MONOCYTES # BLD AUTO: 1.34 10*3/MM3 (ref 0.19–1.3)
NEUTROPHILS # BLD AUTO: 12.78 10*3/MM3 (ref 1.87–8.4)
NEUTROPHILS NFR BLD MANUAL: 46.5 % (ref 39–78)
NEUTS BAND NFR BLD MANUAL: 2 % (ref 0–10)
PLATELET # BLD AUTO: 561 10*3/MM3 (ref 130–400)
PMV BLD AUTO: 11.4 FL (ref 6–12)
POTASSIUM BLD-SCNC: 4.3 MMOL/L (ref 3.5–5.3)
PROT SERPL-MCNC: 7.1 G/DL (ref 6.3–8.7)
RBC # BLD AUTO: 4.3 10*6/MM3 (ref 4.2–5.4)
SMALL PLATELETS BLD QL SMEAR: ABNORMAL
SODIUM BLD-SCNC: 143 MMOL/L (ref 135–145)
TARGETS BLD QL SMEAR: ABNORMAL
VARIANT LYMPHS NFR BLD MANUAL: 8.1 % (ref 0–5)
WBC MORPH BLD: NORMAL
WBC NRBC COR # BLD: 26.35 10*3/MM3 (ref 4.8–10.8)

## 2018-06-07 PROCEDURE — 82962 GLUCOSE BLOOD TEST: CPT

## 2018-06-07 PROCEDURE — 85025 COMPLETE CBC W/AUTO DIFF WBC: CPT | Performed by: NURSE PRACTITIONER

## 2018-06-07 PROCEDURE — 85007 BL SMEAR W/DIFF WBC COUNT: CPT | Performed by: NURSE PRACTITIONER

## 2018-06-07 PROCEDURE — 92507 TX SP LANG VOICE COMM INDIV: CPT

## 2018-06-07 PROCEDURE — 82140 ASSAY OF AMMONIA: CPT | Performed by: NURSE PRACTITIONER

## 2018-06-07 PROCEDURE — 63710000001 INSULIN DETEMIR PER 5 UNITS: Performed by: NURSE PRACTITIONER

## 2018-06-07 PROCEDURE — 80053 COMPREHEN METABOLIC PANEL: CPT | Performed by: NURSE PRACTITIONER

## 2018-06-07 PROCEDURE — 63710000001 DIPHENHYDRAMINE PER 50 MG: Performed by: NURSE PRACTITIONER

## 2018-06-07 PROCEDURE — 25010000002 HEPARIN (PORCINE) PER 1000 UNITS: Performed by: FAMILY MEDICINE

## 2018-06-07 RX ORDER — FUROSEMIDE 20 MG/1
20 TABLET ORAL EVERY OTHER DAY
Status: DISCONTINUED | OUTPATIENT
Start: 2018-06-07 | End: 2018-06-12 | Stop reason: HOSPADM

## 2018-06-07 RX ORDER — LACTULOSE 20 G/30ML
20 SOLUTION ORAL ONCE
Status: COMPLETED | OUTPATIENT
Start: 2018-06-07 | End: 2018-06-07

## 2018-06-07 RX ORDER — SODIUM CHLORIDE 9 MG/ML
50 INJECTION, SOLUTION INTRAVENOUS CONTINUOUS
Status: DISCONTINUED | OUTPATIENT
Start: 2018-06-07 | End: 2018-06-10

## 2018-06-07 RX ORDER — LACTULOSE 20 G/30ML
20 SOLUTION ORAL DAILY
Status: DISCONTINUED | OUTPATIENT
Start: 2018-06-08 | End: 2018-06-12 | Stop reason: HOSPADM

## 2018-06-07 RX ADMIN — AZTREONAM 1000 MG: 1 INJECTION, POWDER, LYOPHILIZED, FOR SOLUTION INTRAMUSCULAR; INTRAVENOUS at 03:09

## 2018-06-07 RX ADMIN — DIPHENHYDRAMINE HYDROCHLORIDE 25 MG: 25 CAPSULE ORAL at 10:19

## 2018-06-07 RX ADMIN — ATORVASTATIN CALCIUM 20 MG: 10 TABLET, FILM COATED ORAL at 08:12

## 2018-06-07 RX ADMIN — LACTULOSE 20 G: 20 SOLUTION ORAL at 15:08

## 2018-06-07 RX ADMIN — MEGESTROL ACETATE 800 MG: 40 SUSPENSION ORAL at 08:11

## 2018-06-07 RX ADMIN — Medication 500 MG: at 08:11

## 2018-06-07 RX ADMIN — AMLODIPINE BESYLATE 5 MG: 5 TABLET ORAL at 08:12

## 2018-06-07 RX ADMIN — VANCOMYCIN HYDROCHLORIDE 125 MG: KIT at 00:21

## 2018-06-07 RX ADMIN — MEMANTINE HYDROCHLORIDE 10 MG: 5 TABLET, FILM COATED ORAL at 21:51

## 2018-06-07 RX ADMIN — FUROSEMIDE 20 MG: 20 TABLET ORAL at 15:08

## 2018-06-07 RX ADMIN — FAMOTIDINE 20 MG: 20 TABLET, FILM COATED ORAL at 08:12

## 2018-06-07 RX ADMIN — TRAMADOL HYDROCHLORIDE 25 MG: 50 TABLET, COATED ORAL at 03:34

## 2018-06-07 RX ADMIN — HEPARIN SODIUM 5000 UNITS: 5000 INJECTION INTRAVENOUS; SUBCUTANEOUS at 06:13

## 2018-06-07 RX ADMIN — VANCOMYCIN HYDROCHLORIDE 125 MG: KIT at 17:34

## 2018-06-07 RX ADMIN — AZTREONAM 1000 MG: 1 INJECTION, POWDER, LYOPHILIZED, FOR SOLUTION INTRAMUSCULAR; INTRAVENOUS at 17:34

## 2018-06-07 RX ADMIN — AZTREONAM 1000 MG: 1 INJECTION, POWDER, LYOPHILIZED, FOR SOLUTION INTRAMUSCULAR; INTRAVENOUS at 09:38

## 2018-06-07 RX ADMIN — TRAMADOL HYDROCHLORIDE 25 MG: 50 TABLET, COATED ORAL at 17:39

## 2018-06-07 RX ADMIN — INSULIN DETEMIR 10 UNITS: 100 INJECTION, SOLUTION SUBCUTANEOUS at 21:51

## 2018-06-07 RX ADMIN — DIPHENHYDRAMINE HYDROCHLORIDE 25 MG: 25 CAPSULE ORAL at 03:34

## 2018-06-07 RX ADMIN — VANCOMYCIN HYDROCHLORIDE 125 MG: KIT at 06:13

## 2018-06-07 RX ADMIN — VANCOMYCIN HYDROCHLORIDE 125 MG: KIT at 12:43

## 2018-06-07 RX ADMIN — HEPARIN SODIUM 5000 UNITS: 5000 INJECTION INTRAVENOUS; SUBCUTANEOUS at 17:40

## 2018-06-07 RX ADMIN — SODIUM CHLORIDE 75 ML/HR: 9 INJECTION, SOLUTION INTRAVENOUS at 17:34

## 2018-06-07 RX ADMIN — ESCITALOPRAM 20 MG: 10 TABLET, FILM COATED ORAL at 08:12

## 2018-06-07 RX ADMIN — TRAMADOL HYDROCHLORIDE 25 MG: 50 TABLET, COATED ORAL at 10:19

## 2018-06-07 RX ADMIN — DONEPEZIL HYDROCHLORIDE 10 MG: 10 TABLET, FILM COATED ORAL at 21:51

## 2018-06-07 RX ADMIN — MEMANTINE HYDROCHLORIDE 10 MG: 5 TABLET, FILM COATED ORAL at 08:11

## 2018-06-07 RX ADMIN — Medication 500 MG: at 21:50

## 2018-06-07 NOTE — PROGRESS NOTES
Continued Stay Note   [place]     Patient Name: Bindu Fountain  MRN: [id number]  Today's Date: 6/7/2018    Admit Date: 6/4/2018          Discharge Plan     Row Name 06/07/18 1045       Plan    Plan Possible SNF    Patient/Family in Agreement with Plan yes    Plan Comments Spoke with Erin from [place], Amanda at [facility], and Message left from [facility] ??? that neither will offer this pt a bed. [facility] did not receive fax, called Louise at [facility] and refaxed referral. Will follow.               Discharge Codes    No documentation.           DAWNA Fajardo

## 2018-06-07 NOTE — PLAN OF CARE
Problem: Patient Care Overview  Goal: Plan of Care Review  Outcome: Ongoing (interventions implemented as appropriate)   06/07/18 1436   Coping/Psychosocial   Plan of Care Reviewed With patient;daughter   Plan of Care Review   Progress improving   OTHER   Outcome Summary Patient reported pain however reports control w/ one PO tramadol. Denies nausea. Tolerating diet. Voiding well although incont. Repositioned q2 hrs. Daughter states that the patient does not stand up or walk within her baseline. Accuchecks cont ac/hs. Will cont to monitor.      Goal: Individualization and Mutuality  Outcome: Ongoing (interventions implemented as appropriate)    Goal: Discharge Needs Assessment  Outcome: Ongoing (interventions implemented as appropriate)    Goal: Interprofessional Rounds/Family Conf  Outcome: Ongoing (interventions implemented as appropriate)      Problem: Fall Risk (Adult)  Goal: Absence of Fall  Outcome: Ongoing (interventions implemented as appropriate)   06/07/18 1436   Fall Risk (Adult)   Absence of Fall making progress toward outcome       Problem: Skin Injury Risk (Adult)  Goal: Skin Health and Integrity  Outcome: Ongoing (interventions implemented as appropriate)   06/07/18 1436   Skin Injury Risk (Adult)   Skin Health and Integrity making progress toward outcome       Problem: Infection, Risk/Actual (Adult)  Goal: Infection Prevention/Resolution  Outcome: Ongoing (interventions implemented as appropriate)   06/07/18 1436   Infection, Risk/Actual (Adult)   Infection Prevention/Resolution making progress toward outcome

## 2018-06-07 NOTE — THERAPY TREATMENT NOTE
"Acute Care - Speech Language Pathology Treatment Note  [facility]     Patient Name: Bindu Fountain  : 1945  MRN: [id number]  Today's Date: 2018         Admit Date: 2018  Pt was oriented to place and name, but was unable to state the correct year or month. Pt needed maximum cues to state her and her daughter's birthday month. When asked questions throughout tx, pt frequently stated, \"I don't know.\" Pt became aggitated and appeared fatigued. Pt was able to identify 3/5 body parts on her face. Pt's daughter stated that pt had a rough night last night because she was up having frequent bowl movements. SLP educated family on the difficulties that the pt had during the cognitive-linguistic evaluaiton yesterday. SLP will continue to follow and treat pt until discharge.   Susanne Miller, Speech Therapy Student 2018 11:29 AM  Visit Dx:      ICD-10-CM ICD-9-CM   1. Urinary tract infection without hematuria, site unspecified N39.0 599.0   2. Pneumonia due to infectious organism, unspecified laterality, unspecified part of lung J18.9 136.9     484.8   3. Altered mental status, unspecified altered mental status type R41.82 780.97   4. Clostridium difficile infection B96.89 041.84   5. Oral phase dysphagia R13.11 787.21   6. Decreased activities of daily living (ADL) Z78.9 V49.89   7. Impaired cognition R41.89 294.9     Patient Active Problem List   Diagnosis   • Thrombocytosis after splenectomy   • Urinary tract infection without hematuria   • Chronic cystitis   • Renal cyst   • Pneumonia symptoms        Therapy Treatment    Therapy Treatment / Health Promotion    Treatment Time/Intention  Discipline: (P) speech language pathologist (18 1035 : Susanne Miller, Speech Therapy Student)  Document Type: (P) therapy note (daily note) (18 1035 : Susanne Miller, Speech Therapy Student)  Subjective Information: (P) complains of, fatigue (18 1035 : Susanne Miller, Speech Therapy Student)  Mode of " Treatment: (P) speech-language pathology (06/07/18 1035 : Karel Shea Therapy Student)  Patient/Family Observations: (P) daughters present  (06/07/18 1035 : Karel Shea Student)  Patient Effort: (P) poor (06/07/18 1035 : Karel Shea Therapy Student)  Plan of Care Review  Plan of Care Reviewed With: (P) patient, daughter (06/07/18 1118 : Karel Shea Student)    Vitals/Pain/Safety  Pain Assessment  Additional Documentation: (P) Pain Scale: FACES Pre/Post-Treatment (Group) (06/07/18 1035 : Karel Shea Therapy Student)  Pain Scale: FACES Pre/Post-Treatment  Pain: FACES Scale, Pretreatment: (P) 0-->no hurt (06/07/18 1035 : Karel Shea Student)  Pain: FACES Scale, Post-Treatment: (P) 0-->no hurt (06/07/18 1035 : Karel Shea Student)    Cognition, Communication, Swallow  Recommendations  Anticipated Dischage Disposition: (P) skilled nursing facility (06/07/18 1035 : Karel Shea Therapy Student)    Outcome Summary  Outcome Summary/Treatment Plan (SLP)  Daily Summary of Progress (SLP): (P) unable to show any progress toward functional goals (06/07/18 1035 : Karel Shea Student)  Barriers to Overall Progress (SLP): (P) fatigue  (06/07/18 1035 : Karel Shea Student)  Plan for Continued Treatment (SLP): (P) Plan to follow pt until discharge (06/07/18 1035 : Karel Shea Therapy Student)  Anticipated Dischage Disposition: (P) skilled nursing facility (06/07/18 1035 : Karel Shea Therapy Student)      EDUCATION  The patient has been educated in the following areas:   Cognitive Impairment.    SLP Recommendation and Plan  SLP Diagnosis: Cognitive-linguistic deficit   Rehab Potential/Prognosis: fair     Anticipated Dischage Disposition: (P) skilled nursing facility        Predicted Duration Therapy Intervention (Days): until discharge       Plan of Care  "Reviewed With: (P) patient, daughter  Plan of Care Review  Plan of Care Reviewed With: (P) patient, daughter  Daily Summary of Progress (SLP): (P) unable to show any progress toward functional goals  Plan for Continued Treatment (SLP): (P) Plan to follow pt until discharge  Outcome Summary: (P) Pt was oriented to place and name, but was unable to state the correct year. Pt needed maximum cues to state the month or her birthday and her daughters birthday. When asked questions throughout tx, pt frequently stated, \"I don't know.\" Pt became aggitated and appeared fatigued. Pt was able to identify 3/5 body parts on her face. Pt's daughter stated that pt had a rough night last night because she was up having frequent bowl movements. SLP educated family on the difficulties that the pt had during the cognitive-linguistic evaluaiton yesterday. SLP will continue to follow and treat pt until discharge.           SLP GOALS     Row Name 06/07/18 1035 06/06/18 0923 06/05/18 0951       Oral Nutrition/Hydration Goal 1 (SLP)    Oral Nutrition/Hydration Goal 1, SLP  --  -- Pt will tolerate least restrictive diet with no overt s/s of aspiration.  -MB    Time Frame (Oral Nutrition/Hydration Goal 1, SLP)  --  -- by discharge  -MB    Barriers (Oral Nutrition/Hydration Goal 1, SLP)  --  -- n/a  -MB    Progress/Outcomes (Oral Nutrition/Hydration Goal 1, SLP)  --  -- goal ongoing  -MB       Words/Phrases/Sentences Goal 1 (SLP)    Improve Ability to Comprehend Words/Phrases/Sentences Through: Goal 1 (SLP) (P)  identify body part;independently (over 90% accuracy)  - identify body part;independently (over 90% accuracy)  -MB (r) MH (t) MB (c)  --    Time Frame (Identify Objects and Pictures Goal 1, SLP) (P)  short term goal (STG);by discharge  - short term goal (STG);by discharge  -MB (r) MH (t) MB (c)  --    Barriers (Identify Objects and Pictures Goal 1, SLP) (P)  fatigue  - fatigue  -MB (r) MH (t) MB (c)  --    Progress/Outcomes " (Identify Objects and Pictures Goal 1, SLP) (P)  continuing progress toward goal  - goal ongoing  -MB (r) MH (t) MB (c)  --       Comprehend Questions Goal 1 (SLP)    Improve Ability to Comprehend Questions Goal 1 (SLP) (P)  simple yes/no questions;independently (over 90% accuracy)  - simple yes/no questions;independently (over 90% accuracy)  -MB (r) MH (t) MB (c)  --    Time Frame (Comprehend Questions Goal 1, SLP) (P)  short term goal (STG);by discharge  - short term goal (STG);by discharge  -MB (r) MH (t) MB (c)  --    Barriers (Comprehend Questions Goal 1, SLP) (P)  fatigue  - fatigue  -MB (r) MH (t) MB (c)  --    Progress/Outcomes (Comprehend Questions Goal 1, SLP) (P)  goal ongoing  - goal ongoing  -MB (r) MH (t) MB (c)  --       Follow Directions Goal 2 (SLP)    Improve Ability to Follow Directions Goal 1 (SLP) (P)  2 step commands;independently (over 90% accuracy)  - 2 step commands;independently (over 90% accuracy)  -MB (r) MH (t) MB (c)  --    Time Frame (Follow Directions Goal 1, SLP) (P)  short term goal (STG);by discharge  - short term goal (STG);by discharge  -MB (r) MH (t) MB (c)  --    Barriers (Improve Ability to Follow Directions Goal 1, SLP) (P)  fatigue   - fatigue   -MB (r) MH (t) MB (c)  --    Progress/Outcomes (Follow Directions Goal 1, SLP) (P)  goal ongoing  - goal ongoing  -MB (r) MH (t) MB (c)  --       Word Retrieval Skills Goal 1 (SLP)    Improve Word Retrieval Skills By Goal 1 (SLP) (P)  completing automatic speech task, days of the week;completing automatic speech task, months;repeating words  - completing automatic speech task, days of the week;completing automatic speech task, months;repeating words  -MB (r) MH (t) MB (c)  --    Time Frame (Word Retrieval Goal 1, SLP) (P)  short term goal (STG);by discharge  - short term goal (STG);by discharge  -MB (r) MH (fernanda) MERRY holloway)  --    Barriers (Word Retrieval Goal 1, SLP) (P)  fatigue   - fatigue   -MB (marisela) TREVON (fernanda) MB (sylvain)   --    Progress (Word Retrieval Skills Goal 1, SLP) (P)  with minimal cues (75-90%)  -MH with minimal cues (75-90%)  -MB (r) MH (t) MB (c)  --    Progress/Outcomes (Word Retrieval Goal 1, SLP) (P)  goal ongoing  -MH goal ongoing  -MB (r) MH (t) MB (c)  --       Ability to Construct Phrase and Sentence Level Response Goal 1 (SLP)    Improve Ability to Construct Phrase and Sentence Level Responses By Goal 1 (SLP) (P)  constructing a sentence with a key word;with minimal cues (75-90%)  - constructing a sentence with a key word;with minimal cues (75-90%)  -MB (r) MH (t) MB (c)  --    Time Frame (Phrase and Sentence Level Response Goal 1, SLP) (P)  short term goal (STG);by discharge  - short term goal (STG);by discharge  -MB (r) MH (t) MB (c)  --    Barriers (Phrase and Sentence Level Response Goal 1, SLP) (P)  fatigue  - fatigue  -MB (r) MH (t) MB (c)  --    Progress (Construct Phrase and Sentence Level Response Goal 1, SLP)  -- with minimal cues (75-90%)  -MB (r) MH (t) MB (c)  --    Progress/Outcomes (Phrase and Sentence Level Response Goal 1, SLP) (P)  goal ongoing  - goal ongoing  -MB (r) MH (t) MB (c)  --       Orientation Goal 1 (SLP)    Improve Orientation Through Goal 1 (SLP) (P)  demonstrating orientation to day;demonstrating orientation to month;demonstrating orientation to place;demonstrating orientation to year;with minimal cues (75-90%)  -MH demonstrating orientation to day;demonstrating orientation to month;demonstrating orientation to place;demonstrating orientation to year;with minimal cues (75-90%)  -MB (r) MH (t) MB (c)  --    Time Frame (Orientation Goal 1, SLP) (P)  short term goal (STG);by discharge  - short term goal (STG);by discharge  -MB (r) MH (t) MB (c)  --    Barriers (Orientation Goal 1, SLP) (P)  fatigue  - fatigue  -MB (r) MH (t) MB (c)  --    Progress (Orientation Goal 1, SLP) (P)  with maximum cues (25-49%)  -MH  --  --    Progress/Outcomes (Orientation Goal 1, SLP) (P)   unable to make needed progress  - goal ongoing  -MB (r) MH (t) MB (c)  --       Organizational Skills Goal 1 (SLP)    Improve Thought Organization Through Goal 1 (SLP) (P)  completing a divergent naming task;completing a convergent naming task;naming similarities and differences;with minimal cues (75-90%)  - completing a divergent naming task;completing a convergent naming task;naming similarities and differences;with minimal cues (75-90%)  -MB (r) MH (t) MB (c)  --    Time Frame (Thought Organization Skills Goal 1, SLP) (P)  short term goal (STG);by discharge  - short term goal (STG);by discharge  -MB (r) MH (t) MB (c)  --    Barriers (Thought Organization Skills Goal 1, SLP) (P)  fatigue  - fatigue  -MB (r) MH (t) MB (c)  --    Progress/Outcomes (Thought Organization Skills Goal 1, SLP) (P)  goal ongoing  - goal ongoing  -MB (r) MH (t) MB (c)  --       Reasoning Goal 1 (SLP)    Improve Reasoning Through Goal 1 (SLP) (P)  complete basic reasoning task;complete analogies;identify absurdities;with minimal cues (75-90%)  - complete basic reasoning task;complete analogies;identify absurdities;with minimal cues (75-90%)  -MB (r) MH (t) MB (c)  --    Time Frame (Reasoning Goal 1, SLP) (P)  short term goal (STG);by discharge  - short term goal (STG);by discharge  -MB (r) MH (t) MB (c)  --    Barriers (Reasoning Goal 1, SLP) (P)  fatigue  - fatigue  -MB (r) MH (t) MB (c)  --    Progress/Outcomes (Reasoning Goal 1, SLP) (P)  goal ongoing  - goal ongoing  -MB (r) MH (t) MB (c)  --       Functional Problem Solving Skills Goal 1 (SLP)    Improve Problem Solving Through Goal 1 (SLP) (P)  determine solutions to simple ADL/safety problems;with minimal cues (75-90%)  - determine solutions to simple ADL/safety problems;with minimal cues (75-90%)  -MB (r) MH (t) MB (c)  --    Time Frame (Problem Solving Goal 1, SLP) (P)  short term goal (STG);by discharge  - short term goal (STG);by discharge  -MB (r) TREVON (t) MB  (c)  --    Barriers (Problem Solving Goal 1, SLP) (P)  fatigue  -MH fatigue  -MB (r) MH (t) MB (c)  --    Progress/Outcomes (Problem Solving Goal 1, SLP) (P)  goal ongoing  - goal ongoing  -MB (r) MH (t) MB (c)  --       Executive Functional Skills Goal 1 (SLP)    Improve Executive Function Skills Goal 1 (SLP) (P)  demonstrate awareness of deficit;organization/planning activity;with minimal cues (75-90%)  -MH demonstrate awareness of deficit;organization/planning activity;with minimal cues (75-90%)  -MB (r) MH (t) MB (c)  --    Time Frame (Executive Function Skills Goal 1, SLP) (P)  short term goal (STG);by discharge  - short term goal (STG);by discharge  -MB (r) MH (t) MB (c)  --    Barriers (Executive Function Skills Goal 1, SLP) (P)  fatigue  -MH fatigue  -MB (r) MH (t) MB (c)  --    Progress/Outcomes (Executive Function Skills Goal 1, SLP) (P)  goal ongoing  - goal ongoing  -MB (r) MH (t) MB (c)  --      User Key  (r) = Recorded By, (t) = Taken By, (c) = Cosigned By    Initials Name Provider Type    MERRY Sanders, CCC-SLP Speech and Language Pathologist    TREVON Miller, Speech Therapy Student Speech Therapy Student             SLP Outcome Measures (last 72 hours)      SLP Outcome Measures     Row Name 06/06/18 1200 06/06/18 1000 06/05/18 1000       SLP Outcome Measures    Outcome Measure Used? Adult NOMS  -MB (r) MH (t) MB (c) --  -MB (r) MH (t) MB (c) Adult NOMS  -MB       FCM Scores    FCM Chosen Problem Solving  -MB (r) MH (t) MB (c) --  -MB (r) MH (t) MB (c) Swallowing  -MB    Swallowing FCM Score  --  -- 5  -MB    Problem Solving FCM Score 3  -MB (r) MH (t) MB (c) --  -MB (r) MH (t) MB (c)  --      User Key  (r) = Recorded By, (t) = Taken By, (c) = Cosigned By    Initials Name Effective Dates    MERRY Sanders, SUNDEEP-SLP 08/02/16 -      Susanne Miller, Speech Therapy Student 05/04/18 -             Time Calculation:           Time Calculation- SLP     Row Name 06/07/18 112              Time Calculation- [facility]    SLP Start Time (P)  1035  -      SLP Stop Time (P)  1101  -      SLP Time Calculation (min) (P)  26 min  -      SLP Received On (P)  06/07/18  -        User Key  (r) = Recorded By, (t) = Taken By, (c) = Cosigned By    Initials Name Provider Type     Susanne Miller, Speech Therapy Student Speech Therapy Student          Therapy Charges for Today     Code Description Service Date Service Provider Modifiers Qty    98725529445 HC ST EVAL SPEECH AND PROD W LANG  5 6/6/2018 Susanne Miller, Speech Therapy Student GN, KX 1    [id number] HC ST TREATMENT SPEECH 2 6/7/2018 Susanne Miller, Speech Therapy Student TRISTAN, KX 1             ADULT NOMS (last 72 hours)      Adult NOMS     Row Name 06/06/18 1200 06/06/18 1000 06/05/18 1000             FCM Scores    FCM Chosen Problem Solving  -MB (r) MH (t) MB (c) --  -MB (r) MH (t) MB (c) Swallowing  -MB      Swallowing FCM Score  --  -- 5  -MB      Problem Solving FCM Score 3  -MB (r) MH (t) MB (c) --  -MB (r) MH (t) MB (c)  --        User Key  (r) = Recorded By, (t) = Taken By, (c) = Cosigned By    Initials Name Effective Dates    MB Jhonatan Sanders CCC-SLP 08/02/16 -      Susanne Miller, Speech Therapy Student 05/04/18 -         SLP G-Codes  SLP NOMS Used?: Yes  Functional Limitations: (S) Other Speech Language Pathology (problem solving)  Swallow Current Status (): At least 20 percent but less than 40 percent impaired, limited or restricted  Swallow Goal Status (): At least 20 percent but less than 40 percent impaired, limited or restricted  Other Speech-Language Pathology Functional Limitation Current Status (): At least 60 percent but less than 80 percent impaired, limited or restricted  Other Speech-Language Pathology Functional Limitation Goal Status (): At least 60 percent but less than 80 percent impaired, limited or restricted  Other Speech-Language Pathology Functional Limitation Discharge Status (): At  least 60 percent but less than 80 percent impaired, limited or restricted      Susanne Miller, Speech Therapy Student  6/7/2018

## 2018-06-07 NOTE — PLAN OF CARE
Problem: Patient Care Overview  Goal: Plan of Care Review  Outcome: Ongoing (interventions implemented as appropriate)   06/07/18 0522   Coping/Psychosocial   Plan of Care Reviewed With patient;daughter   Plan of Care Review   Progress no change   OTHER   Outcome Summary medicated x1 for c/o pain, medicated x1 for itching with benadryl PRN, BM x3 this shift, will cont to monitor.      Goal: Discharge Needs Assessment  Outcome: Ongoing (interventions implemented as appropriate)      Problem: Fall Risk (Adult)  Goal: Absence of Fall  Outcome: Ongoing (interventions implemented as appropriate)   06/07/18 0522   Fall Risk (Adult)   Absence of Fall making progress toward outcome       Problem: Skin Injury Risk (Adult)  Goal: Skin Health and Integrity  Outcome: Ongoing (interventions implemented as appropriate)   06/07/18 0522   Skin Injury Risk (Adult)   Skin Health and Integrity making progress toward outcome       Problem: Infection, Risk/Actual (Adult)  Goal: Infection Prevention/Resolution  Outcome: Ongoing (interventions implemented as appropriate)   06/07/18 0522   Infection, Risk/Actual (Adult)   Infection Prevention/Resolution making progress toward outcome

## 2018-06-07 NOTE — DISCHARGE PLACEMENT REQUEST
"Ladan Nick [phone]  Bindu Fountain (73 y.o. Female)     Date of Birth Social Security Number Address Home Phone MRN    1945  [address] [address]  [id number]    Hoah[employer]ism Marital Status          Mosque        Admission Date Admission Type Admitting Provider Attending Provider Department, Room/Bed    6/4/18 Emergency Darian Garnica MD Moore, Darian Hoang MD [facility] 3C, 362/1    Discharge Date Discharge Disposition Discharge Destination                       Attending Provider:  Darian Garnica MD    Allergies:  Cephalexin, Oxycodone, Sulfa Antibiotics, Sulfamethoxazole-trimethoprim    Isolation:  Spore   Infection:  C.difficile (05/15/18)   Code Status:  FULL    Ht:  167.6 cm (65.98\")   Wt:  89.9 kg (198 lb 1.6 oz)    Admission Cmt:  None   Principal Problem:  None                Active Insurance as of 6/4/2018     Primary Coverage     Payor Plan Insurance Group Employer/Plan Group    MEDICARE MEDICARE A & B      Payor Plan Address Payor Plan Phone Number Effective From Effective To    [address] [phone] 6/1/2003     [facility] [id number]       Subscriber Name Subscriber Birth Date Member ID       BINDU FOUNTAIN 1945 [id number]           Secondary Coverage     Payor Plan Insurance Group Employer/Plan Group    AARP MED SUPP AARP HEALTH CARE OPTIONS      Payor Plan Address Payor Plan Phone Number Effective From Effective To    [facility] [phone] 1/1/2017     [address]       [facility] [address]       Subscriber Name Subscriber Birth Date Member BINDU DONOVAN 1945 [id number]                 Emergency Contacts      (Rel.) Home Phone Work Phone Mobile Phone    Milena Bradshaw (Daughter) [phone] -- --    Karen Posey (Daughter) -- -- [phone]    Mariaelena Garzon (Daughter) -- -- [phone]    Sienna Zendejas (Daughter) -- -- [phone]    Josef Zendejas (Power of ) -- -- [phone]    "            History & Physical      Vinod Butler MD at 6/4/2018 10:12 PM              [facility] Medicine Services  HISTORY AND PHYSICAL    Date of Admission: 6/4/2018  Primary Care Physician: Magan Lopez MD    Subjective     Chief Complaint: Altered mental status    History of Present Illness  73-year-old female with past medical history of liver cancer, dementia, diabetes mellitus type II, dysphagia, hypertension and hyperlipidemia was brought into the ER by her family members for altered mental status.  Apparently patient has been confused for last couple days.  Patient was recently discharged from hospital after being treated for hypercalcemia with IV fluid.  For last couple days patient's family member noted she has some mental status change so they decided to bring into the ER for further evaluation.  In the ER patient's chest x-ray was noted for possible underlying left upper lobe pneumonia.  Her CT head in the ER was negative for any acute intracranial findings.  Urinalysis was positive for UTI.  Patient is currently being treated for C. difficile with oral vancomycin at home.  Initial lab was noted for acute renal failure, hyperglycemia, transaminitis, leukocytosis.  Patient will be admitted for altered mental status possibly secondary to UTI and pneumonia.        Review of Systems     Otherwise complete ROS reviewed and negative except as mentioned in the HPI.    Past Medical History:   Past Medical History:   Diagnosis Date   • Cancer     liver   • Dementia    • Diabetes    • Dysphagia    • High cholesterol    • Hypertension    • Laryngopharyngeal reflux    • Lingual tonsil hypertrophy    • Thyroid nodule      Past Surgical History:  Past Surgical History:   Procedure Laterality Date   • APPENDECTOMY     • CHOLECYSTECTOMY     • FINE NEEDLE ASPIRATION     • KNEE SURGERY     • SPLENECTOMY     • TONSILLECTOMY       Social History:  reports that she quit smoking about 6  years ago. She has never used smokeless tobacco. Drug use questions deferred to the physician. She reports that she does not drink alcohol.    Family History: family history includes Cancer in her mother; Heart disease in her mother.       Allergies:  Allergies   Allergen Reactions   • Cephalexin Shortness Of Breath   • Oxycodone Other (See Comments)     Caused PT to jerk uncontrollably.     • Sulfa Antibiotics Shortness Of Breath   • Sulfamethoxazole-Trimethoprim Shortness Of Breath     Medications:  Prior to Admission medications    Medication Sig Start Date End Date Taking? Authorizing Provider   acetaminophen (TYLENOL) 325 MG tablet Take 650 mg by mouth Every 4 (Four) Hours As Needed for Mild Pain .   Yes Historical Provider, MD   Loperamide HCl (IMODIUM PO) Take 2 mg by mouth 3 (Three) Times a Day As Needed.   Yes Historical Provider, MD   losartan (COZAAR) 50 MG tablet Take 100 mg by mouth Daily.   Yes Historical Provider, MD   traMADol (ULTRAM) 50 MG tablet Take 50 mg by mouth Every 8 (Eight) Hours As Needed for Moderate Pain .   Yes Historical Provider, MD   vancomycin 50 MG/ML reconstituted solution oral solution reconstituted Take 5 mL by mouth Every 6 (Six) Hours.   Yes Historical Provider, MD   amLODIPine (NORVASC) 5 MG tablet Take 1 tablet by mouth Daily. 5/24/18   Darian Garnica MD   choline fenofibrate (TRILIPIX) 135 MG capsule Take 135 mg by mouth Every Night.    Historical Provider, MD   diazePAM (VALIUM) 2 MG tablet Take 1 tablet by mouth Every 12 (Twelve) Hours As Needed for Anxiety. 5/23/18   Darian Garnica MD   donepezil (ARICEPT) 10 MG tablet Take 10 mg by mouth Every Night.    Historical Provider, MD   escitalopram (LEXAPRO) 20 MG tablet Take 20 mg by mouth Daily.    Historical Provider, MD   esomeprazole (nexIUM) 40 MG capsule Take 40 mg by mouth 2 (Two) Times a Day.    Historical Provider, MD   furosemide (LASIX) 20 MG tablet Take 20 mg by mouth Every Other Day.    Historical  "Provider, MD   hydrochlorothiazide (MICROZIDE) 12.5 MG capsule Take 12.5 mg by mouth Daily.    Historical Provider, MD   Insulin Glargine (LANTUS SOLOSTAR) 100 UNIT/ML injection pen Inject 40 Units under the skin 2 (Two) Times a Day.    Historical Provider, MD   Insulin Lispro (HUMALOG KWIKPEN) 100 UNIT/ML solution pen-injector Inject 28 Units under the skin 3 (Three) Times a Day.    Historical Provider, MD   magnesium oxide (MAG-OX) 400 MG tablet Take 400 mg by mouth Daily.    Historical Provider, MD   megestrol (MEGACE) 40 MG/ML suspension Take 20 mL by mouth Daily. 5/24/18   Darian Garnica MD   memantine (NAMENDA) 10 MG tablet Take 10 mg by mouth 2 (Two) Times a Day.    Historical Provider, MD   Misc Natural Products (CORNSILK PO) Take 1 tablet by mouth Daily.    Historical Provider, MD   saccharomyces boulardii (FLORASTOR) 250 MG capsule Take 1 capsule by mouth 2 (Two) Times a Day. 5/23/18   Darian Garnica MD   simvastatin (ZOCOR) 40 MG tablet Take 40 mg by mouth Daily.    Historical Provider, MD   thiamine (VITAMIN B-1) 100 MG tablet Take 100 mg by mouth Daily.    Historical Provider, MD     Objective     Vital Signs: /52   Pulse 70   Temp 97.6 °F (36.4 °C) (Tympanic)   Resp 15   Ht 167.6 cm (66\")   Wt 95.7 kg (211 lb)   SpO2 93%   BMI 34.06 kg/m²    Physical Exam   Constitutional:   Somewhat confused   HENT:   Head: Normocephalic.   Eyes: Pupils are equal, round, and reactive to light.   Neck: Normal range of motion.   Cardiovascular: Normal rate, regular rhythm and normal heart sounds.    Pulmonary/Chest: Effort normal. No respiratory distress. She has wheezes. She has no rales. She exhibits no tenderness.   Abdominal: Soft.   Musculoskeletal: Normal range of motion.   Neurological: She is alert.   Somewhat confused but answers question when asked   Skin: Skin is warm. Capillary refill takes less than 2 seconds.   Psychiatric: She has a normal mood and affect.             Results " Reviewed:  Lab Results (last 24 hours)     Procedure Component Value Units Date/Time    Blood Culture - Blood, Blood, Venous Line [[id number]] Collected:  06/04/18 2145    Specimen:  Blood from Hand, Left Updated:  06/04/18 2203    Blood Culture - Blood, Blood, Venous Line [[id number]] Collected:  06/04/18 2147    Specimen:  Blood from Arm, Right Updated:  06/04/18 2203    Urinalysis With / Culture If Indicated - Urine, Catheter [[id number]]  (Abnormal) Collected:  06/04/18 2028    Specimen:  Urine from Urine, Catheter Updated:  06/04/18 2043     Color, UA Yellow     Appearance, UA Cloudy (A)     pH, UA <=5.0     Specific Gravity, UA 1.016     Glucose, UA Negative     Ketones, UA Negative     Bilirubin, UA Negative     Blood, UA Trace (A)     Protein,  mg/dL (2+) (A)     Leuk Esterase, UA Moderate (2+) (A)     Nitrite, UA Positive (A)     Urobilinogen, UA 0.2 E.U./dL    Urinalysis, Microscopic Only - Urine, Clean Catch [[id number]]  (Abnormal) Collected:  06/04/18 2028    Specimen:  Urine from Urine, Catheter Updated:  06/04/18 2043     RBC, UA 0-2 (A) /HPF      WBC, UA Too Numerous to Count (A) /HPF      Bacteria, UA 4+ (A) /HPF      Squamous Epithelial Cells, UA 0-2 /HPF      Hyaline Casts, UA None Seen /LPF      Methodology Automated Microscopy    Urine Culture - Urine, [[id number]] Collected:  06/04/18 2028    Specimen:  Urine from Urine, Catheter Updated:  06/04/18 2043    CBC Auto Differential [[id number]]  (Abnormal) Collected:  06/04/18 1856    Specimen:  Blood Updated:  06/04/18 2027     WBC 29.80 (H) 10*3/mm3      RBC 4.65 10*6/mm3      Hemoglobin 13.0 g/dL      Hematocrit 41.2 %      MCV 88.6 fL      MCH 28.0 pg      MCHC 31.6 (L) g/dL      RDW 16.8 (H) %      RDW-SD 53.3 fl      MPV 11.8 fL      Platelets 659 (H) 10*3/mm3     Manual Differential [[id number]]  (Abnormal) Collected:  06/04/18 0746    Specimen:  Blood Updated:  06/04/18 2027     Neutrophil % 31.0 (L) %      Lymphocyte % 44.0 %      Monocyte  % 4.0 %      Eosinophil % 1.0 %      Bands %  1.0 %      Atypical Lymphocyte % 19.0 (H) %      Neutrophils Absolute 9.54 (H) 10*3/mm3      Lymphocytes Absolute 13.11 (H) 10*3/mm3      Monocytes Absolute 1.19 10*3/mm3      Eosinophils Absolute 0.30 10*3/mm3      Anisocytosis Slight/1+     Macrocytes Slight/1+     Target Cells Slight/1+     Smudge Cells Slight/1+     Platelet Estimate Increased     Clumped Platelets Present     Giant Platelets Slight/1+    Comprehensive Metabolic Panel [[id number]]  (Abnormal) Collected:  06/04/18 1856    Specimen:  Blood Updated:  06/04/18 2009     Glucose 170 (H) mg/dL      BUN 32 (H) mg/dL      Comment: Specimen hemolyzed. Results may be affected.        Creatinine 1.62 (H) mg/dL      Sodium 141 mmol/L      Potassium 4.2 mmol/L      Comment: Specimen hemolyzed.  Results may be affected.        Chloride 104 mmol/L      CO2 23.0 (L) mmol/L      Calcium 10.5 (H) mg/dL      Total Protein 8.0 g/dL      Albumin 3.70 g/dL      ALT (SGPT) 23 U/L      Comment: Specimen hemolyzed.  Results may be affected.        AST (SGOT) 182 (H) U/L      Comment: Specimen hemolyzed.  Results may be affected.        Alkaline Phosphatase 223 (H) U/L      Comment: Specimen hemolyzed. Results may be affected.        Total Bilirubin 0.9 mg/dL      eGFR Non African Amer 31 (L) mL/min/1.73      Globulin 4.3 gm/dL      A/G Ratio 0.9 (L) g/dL      BUN/Creatinine Ratio 19.8     Anion Gap 14.0 (H) mmol/L     Narrative:       The MDRD GFR formula is only valid for adults with stable renal function between ages 18 and 70.    [place] Draw [[id number]] Collected:  06/04/18 1856    Specimen:  Blood Updated:  06/04/18 2000    Narrative:       The following orders were created for panel order [place] Draw.  Procedure                               Abnormality         Status                     ---------                               -----------         ------                     Light Blue Top[[id number]]                                    Final result               Green Top (Gel)[[id number]]                                  Final result               Lavender Top[[id number]]                                     Final result               Red Top[[id number]]                                          Final result                 Please view results for these tests on the individual orders.    Light Blue Top [[id number]] Collected:  06/04/18 1856    Specimen:  Blood Updated:  06/04/18 2000     Extra Tube hold for add-on     Comment: Auto resulted       Green Top (Gel) [[id number]] Collected:  06/04/18 1856    Specimen:  Blood Updated:  06/04/18 2000     Extra Tube Hold for add-ons.     Comment: Auto resulted.       Lavender Top [[id number]] Collected:  06/04/18 1856    Specimen:  Blood Updated:  06/04/18 2000     Extra Tube hold for add-on     Comment: Auto resulted       Red Top [[id number]] Collected:  06/04/18 1856    Specimen:  Blood Updated:  06/04/18 2000     Extra Tube Hold for add-ons.     Comment: Auto resulted.       TSH [[id number]]  (Normal) Collected:  06/04/18 1856    Specimen:  Blood Updated:  06/04/18 1950     TSH 1.210 mIU/mL     Protime-INR [[id number]]  (Normal) Collected:  06/04/18 1856    Specimen:  Blood Updated:  06/04/18 1924     Protime 14.5 Seconds      INR 1.09    aPTT [[id number]]  (Abnormal) Collected:  06/04/18 1856    Specimen:  Blood Updated:  06/04/18 1924     PTT 37.7 (H) seconds     Magnesium [[id number]]  (Normal) Collected:  06/04/18 1856    Specimen:  Blood Updated:  06/04/18 1922     Magnesium 2.2 mg/dL      Comment: Specimen hemolyzed.  Results may be affected.           Imaging Results (last 24 hours)     Procedure Component Value Units Date/Time    CT Head Without Contrast [[id number]] Collected:  06/04/18 2159     Updated:  06/04/18 2204    Narrative:       CT BRAIN without contrast 6/4/2018 9:08 PM CDT     HISTORY: Altered level of consciousness. Delirium.     COMPARISON: None      DLP: 575 mGy cm. Automated  exposure control was utilized to diminish  patient radiation dose.     TECHNIQUE: Serial axial tomographic images of the brain were obtained  without the use of intravenous contrast.      FINDINGS:   The midline structures are nondisplaced. There is moderate cerebral and  cerebellar volume loss, with an associated increase in the prominence of  the ventricles and sulci. The basilar cisterns are normal in size and  configuration. There is no evidence of intracranial hemorrhage or  mass-effect. There is low attenuation in the periventricular white  matter, consistent with chronic ischemic change. There are no abnormal  extra-axial fluid collections. There is no evidence of tonsillar  herniation.      The included orbits and their contents are unremarkable. The visualized  paranasal sinuses, mastoid air cells and middle ear cavities are clear.  The visualized osseous structures and overlying soft tissues of the  skull and face are intact.        Impression:       Moderate cerebral and cerebellar volume loss with chronic microvascular  disease but no evidence of acute intracranial process.        This report was finalized on 06/04/2018 22:01 by Dr. Son Guerra MD.    XR Chest 1 View [[id number]] Collected:  06/04/18 1915     Updated:  06/04/18 1920    Narrative:       EXAMINATION: Chest 1 view 6/4/2018     HISTORY: Altered mental status     FINDINGS: Upright frontal projection of the chest is compared to prior  exam of 5/4/2018. Lungs are hypoventilated causing accentuation of  bronchovascular markings and cardiac silhouette. There is an ill-defined  opacity within the left upper lobe peripherally suspicious for  pneumonia. The lungs are otherwise clear. There is no effusion or free  air present.       Impression:       1. Expiratory chest.  2. Suspected patchy infiltrate within the periphery of the left upper  lobe.  This report was finalized on 06/04/2018 19:16 by Dr. Son Guerra MD.        I have  personally reviewed and interpreted the radiology studies and ECG obtained at time of admission.     Assessment / Plan     Assessment:   Hospital Problem List     Urinary tract infection without hematuria        1.  Left-sided pneumonia  2.  UTI  3.  Acute renal failure  4.  Transaminitis  5.  Hyperglycemia  6.  Leukocytosis  7.  C. difficile infection    Plan:      -Admit to telemetry  -Continue cardiac monitoring  -Continuous pulse ox  -Follow-up EKG  -Continue IV antibiotic  -Follow-up blood culture  -Follow-up sputum culture  -Continue breathing treatment  -Follow-up urine culture  -Continue IV fluid  -Follow-up renal ultrasound  -Follow-up urine sodium, urine creatinine and urine eosinophil  -Hold nephrotoxic medications for now  -Follow-up abdominal ultrasound  -Follow-up hepatitis panel  -Hold hepatotoxic medications for now  -Strict glycemic control and sliding scale  -Follow-up a.m. WBC  -No signs of sepsis at the moment  -Consider aggressive fluid resuscitation if indicated  -Patient is currently on oral vancomycin for C. difficile infection  -Start IV Flagyl and hold oral vancomycin while hospitalized for C. difficile infection  -GI prophylaxis  -DVT prophylaxis          Code Status: Full code     I discussed the patients findings and my recommendations with the patient's RN    Estimated length of stay 2-3 days    Vinod Butler MD   06/04/18   10:12 PM              Electronically signed by Vinod Butler MD at 6/4/2018 10:29 PM       Hospital Medications (active)       Dose Frequency Start End    amLODIPine (NORVASC) tablet 5 mg 5 mg Every 24 Hours Scheduled 6/5/2018     Sig - Route: Take 1 tablet by mouth Daily. - Oral    atorvastatin (LIPITOR) tablet 20 mg 20 mg Daily 6/5/2018     Sig - Route: Take 2 tablets by mouth Daily. - Oral    aztreonam (AZACTAM) 1 g/10mL IV PUSH syringe 1 g Every 8 Hours 6/5/2018 6/8/2018    Sig - Route: Infuse 10 mL into a venous catheter Every 8 (Eight) Hours. -  "Intravenous    Linked Group 1:  \"And\" Linked Group Details        diphenhydrAMINE (BENADRYL) capsule 25 mg 25 mg Every 6 Hours PRN 6/6/2018     Sig - Route: Take 1 capsule by mouth Every 6 (Six) Hours As Needed for Itching. - Oral    donepezil (ARICEPT) tablet 10 mg 10 mg Nightly 6/5/2018     Sig - Route: Take 1 tablet by mouth Every Night. - Oral    escitalopram (LEXAPRO) tablet 20 mg 20 mg Daily 6/5/2018     Sig - Route: Take 2 tablets by mouth Daily. - Oral    famotidine (PEPCID) tablet 20 mg 20 mg Daily 6/5/2018     Sig - Route: Take 1 tablet by mouth Daily. - Oral    furosemide (LASIX) tablet 20 mg 20 mg Every Other Day 6/7/2018     Sig - Route: Take 1 tablet by mouth Every Other Day. - Oral    glucagon (human recombinant) (GLUCAGEN DIAGNOSTIC) injection 1 mg 1 mg As Needed 6/5/2018     Sig - Route: Inject 1 mg under the skin As Needed (Blood Glucose Less Than 70). - Subcutaneous    heparin (porcine) 5000 UNIT/ML injection 5,000 Units 5,000 Units Every 12 Hours Scheduled 6/5/2018     Sig - Route: Inject 1 mL under the skin Every 12 (Twelve) Hours. - Subcutaneous    insulin detemir (LEVEMIR) injection 10 Units 10 Units Every 12 Hours Scheduled 6/7/2018     Sig - Route: Inject 10 Units under the skin Every 12 (Twelve) Hours. - Subcutaneous    insulin lispro (humaLOG) injection 2-7 Units 2-7 Units 4 Times Daily With Meals & Nightly 6/5/2018     Sig - Route: Inject 2-7 Units under the skin 4 (Four) Times a Day With Meals & at Bedtime. - Subcutaneous    ipratropium-albuterol (DUO-NEB) nebulizer solution 3 mL 3 mL Every 6 Hours PRN 6/6/2018     Sig - Route: Take 3 mL by nebulization Every 6 (Six) Hours As Needed for Shortness of Air. - Nebulization    lactulose solution 20 g 20 g Once 6/7/2018     Sig - Route: Take 30 mL by mouth 1 (One) Time. - Oral    megestrol (MEGACE) 40 MG/ML suspension 800 mg 800 mg Daily 6/5/2018     Sig - Route: Take 20 mL by mouth Daily. - Oral    memantine (NAMENDA) tablet 10 mg 10 mg Every " "12 Hours Scheduled 6/5/2018     Sig - Route: Take 2 tablets by mouth Every 12 (Twelve) Hours. - Oral    ondansetron (ZOFRAN) injection 4 mg 4 mg Every 6 Hours PRN 6/5/2018     Sig - Route: Infuse 2 mL into a venous catheter Every 6 (Six) Hours As Needed for Nausea or Vomiting. - Intravenous    saccharomyces boulardii (FLORASTOR) capsule 500 mg 500 mg 2 Times Daily 6/6/2018     Sig - Route: Take 2 capsules by mouth 2 (Two) Times a Day. - Oral    sodium chloride 0.9 % flush 1-10 mL 1-10 mL As Needed 6/5/2018     Sig - Route: Infuse 1-10 mL into a venous catheter As Needed for Line Care. - Intravenous    sodium chloride 0.9 % flush 10 mL 10 mL As Needed 6/4/2018     Sig - Route: Infuse 10 mL into a venous catheter As Needed for Line Care. - Intravenous    Linked Group 2:  \"And\" Linked Group Details        traMADol (ULTRAM) tablet 25 mg 25 mg Every 6 Hours PRN 6/6/2018 6/16/2018    Sig - Route: Take 0.5 tablets by mouth Every 6 (Six) Hours As Needed for Moderate Pain . - Oral    vancomycin oral solution 125 mg 125 mg Every 6 Hours Scheduled 6/5/2018 6/15/2018    Sig - Route: Take 2.5 mL by mouth Every 6 (Six) Hours. - Oral    ipratropium-albuterol (DUO-NEB) nebulizer solution 3 mL (Discontinued) 3 mL 4 Times Daily - RT 6/5/2018 6/6/2018    Sig - Route: Take 3 mL by nebulization 4 (Four) Times a Day. - Nebulization    saccharomyces boulardii (FLORASTOR) capsule 250 mg (Discontinued) 250 mg 2 Times Daily 6/5/2018 6/6/2018    Sig - Route: Take 1 capsule by mouth 2 (Two) Times a Day. - Oral    sodium chloride 0.9 % infusion (Discontinued) 50 mL/hr Continuous 6/5/2018 6/7/2018    Sig - Route: Infuse 50 mL/hr into a venous catheter Continuous. - Intravenous    vancomycin 1250 mg/250 mL 0.9% NS IVPB ([facility]) (Discontinued) 1,250 mg Every 24 Hours 6/6/2018 6/7/2018    Sig - Route: Infuse 250 mL into a venous catheter Daily. - Intravenous          Operative/Procedure Notes (last 24 hours) (Notes from 6/6/2018  3:07 PM through " 6/7/2018  3:07 PM)     No notes of this type exist for this encounter.           Physician Progress Notes (last 24 hours) (Notes from 6/6/2018  3:07 PM through 6/7/2018  3:07 PM)      JULI Malone at 6/7/2018  1:46 PM              [facility] Medicine Services  INPATIENT PROGRESS NOTE    Length of Stay: 3  Date of Admission: 6/4/2018  Primary Care Physician: Magan Lopez MD    Subjective   Chief Complaint: Follow-up UTI  HPI   The patient is resting in bed with daughter at bedside. The patient tells me that she feels about the same today. Her itching and generalized pain have improved with medications. Her daughter tells me that prior to hospitalization she was working with therapy reasonably and was able to walk about 30 feet at a time. She has only sat up on the side of the bed since admission. She denies any shortness of breath or chest pain. She denies any nausea or vomiting. She has had 2 bowel movements today.     Review of Systems   All pertinent negatives and positives are as above. All other systems have been reviewed and are negative unless otherwise stated.     Objective    Temp:  [97.4 °F (36.3 °C)-98.4 °F (36.9 °C)] 98.4 °F (36.9 °C)  Heart Rate:  [61-78] 65  Resp:  [16-20] 20  BP: (141-162)/(59-82) 144/60  Physical Exam  Constitutional: She is oriented to person, place, and time. She appears well-developed and well-nourished. No distress.   HENT:   Head: Normocephalic and atraumatic.   Neck: Normal range of motion. Neck supple. No JVD present. No tracheal deviation present. No thyromegaly present.   Cardiovascular: Normal rate, regular rhythm, normal heart sounds and intact distal pulses.  Exam reveals no gallop and no friction rub.    No murmur heard.  Pulmonary/Chest: Effort normal and breath sounds normal. No respiratory distress. She has no wheezes. She has no rales.   Abdominal: Soft. Bowel sounds are normal. She exhibits no distention. There is  tenderness (RUQ tenderness with palpation). There is no guarding.   Musculoskeletal: Normal range of motion. She exhibits no edema or tenderness.   Generalized weakness   Lymphadenopathy:     She has no cervical adenopathy.   Neurological:  She is alert and oriented to person and place. No cranial nerve deficit.    Skin: Skin is warm and dry. No rash noted. No erythema.   Psychiatric: She has a normal mood and affect.   Vitals reviewed.    Results Review:  I have reviewed the labs, radiology results, and diagnostic studies.    Laboratory Data:     Results from last 7 days  Lab Units 06/07/18  0548 06/06/18  0634 06/05/18  0437   WBC 10*3/mm3 26.35* 28.70* 46.97*   HEMOGLOBIN g/dL 12.2 12.0 12.1   HEMATOCRIT % 38.1 37.9 38.0   PLATELETS 10*3/mm3 561* 538* 620*       Results from last 7 days  Lab Units 06/07/18  0548 06/06/18  0634 06/05/18  0437   SODIUM mmol/L 143 144 144   POTASSIUM mmol/L 4.3 4.0 4.0   CHLORIDE mmol/L 113* 112* 108   CO2 mmol/L 19.0* 22.0* 24.0   BUN mg/dL 15 19 28*   CREATININE mg/dL 1.05 1.26 1.59*   CALCIUM mg/dL 10.8* 10.1 10.1   BILIRUBIN mg/dL 0.8 0.7 0.7   ALK PHOS U/L 177* 186* 211*   ALT (SGPT) U/L 24 23 26   AST (SGOT) U/L 126* 135* 147*   GLUCOSE mg/dL 149* 118* 117*     I have reviewed the patient current medications.     Assessment/Plan   Assessment:  1. Left upper lobe patchy infiltrate concerning for pneumonia- healthcare associated. Repeat chest x-ray reveals resolution of the infiltrate  2. Urinary tract infection, urine culture with growth of Klebsiella pneumoniae  3. Acute metabolic encephalopathy- likely secondary to above  4. Acute kidney injury on chronic kidney disease- difficult to ascertain baseline kidney function.  Improved  5. Leukocytosis  6. Recent diagnosis of cholangicarcinoma  7. Clostridium Difficile colitis with recurrence  8. Dementia  9. Insulin dependent Diabetes Mellitus Type 2, hgb A1c 6.8%  10. Asplenic  11. Thrombocytosis    Plan:  1. Lactulose again today  for continued elevated ammonia  2. Today will complete 3 days of Azactam for Klebsiella UTI  3. Discontinue Vancomycin as positive blood culture with staph species, not aureus is likely a contaminant. Repeat blood culture with no growth at less than 24 hours. Continue to monitor  4. Discontinue IV fluids  5. Continue to hold HCTZ and Losartan for now.    6. Continue occupational/speech therapy. Patient has not yet been evaluated by physical therapy  7. Labs in AM- CBC, CMP  8. Last blood glucoses- 155, 149, 123, 118. Continue current regimen and will add Levemir back at lower dosage    Discharge Planning: I expect the patient to be discharged to SNF in ? days.    JULI Malone   06/07/18   1:46 PM    Electronically signed by JULI Malone at 6/7/2018  2:24 PM       Consult Notes (last 24 hours) (Notes from 6/6/2018  3:07 PM through 6/7/2018  3:07 PM)     No notes of this type exist for this encounter.        Nutrition Notes (last 24 hours) (Notes from 6/6/2018  3:07 PM through 6/7/2018  3:07 PM)     No notes of this type exist for this encounter.        Physical Therapy Notes (last 24 hours) (Notes from 6/6/2018  3:07 PM through 6/7/2018  3:07 PM)     No notes of this type exist for this encounter.        Occupational Therapy Notes (last 24 hours) (Notes from 6/6/2018  3:07 PM through 6/7/2018  3:07 PM)     No notes of this type exist for this encounter.        Speech Language Pathology Notes (last 24 hours) (Notes from 6/6/2018  3:07 PM through 6/7/2018  3:07 PM)     No notes of this type exist for this encounter.        Susanne Miller, Speech Therapy Student Speech Therapy Student Cosign Needed   Plan of Care Date of Service: 6/7/2018 11:26 AM         Problem: Patient Care Overview  Goal: Plan of Care Review  Outcome: Ongoing (interventions implemented as appropriate)    06/07/18 1118   Coping/Psychosocial   Plan of Care Reviewed With patient;daughter   OTHER   Outcome Summary Pt was oriented  "to place and name, but was unable to state the correct year or month. Pt needed maximum cues to state her and her daughter's birthday month. When asked questions throughout tx, pt frequently stated, \"I don't know.\" Pt became aggitated and appeared fatigued. Pt was able to identify 3/5 body parts on her face. Pt's daughter stated that pt had a rough night last night because she was up having frequent bowl movements. SLP educated family on the difficulties that the pt had during the cognitive-linguistic evaluaiton yesterday. SLP will continue to follow and treat pt until discharge.                   DARCI Sorensen/MARIELA Occupational Therapy Assistant Signed Occupational Therapy  Plan of Care Date of Service: 6/6/2018  3:07 PM         Problem: Patient Care Overview  Goal: Plan of Care Review  Outcome: Ongoing (interventions implemented as appropriate)    06/06/18 1506   Coping/Psychosocial   Plan of Care Reviewed With patient;family   OTHER   Outcome Summary Pt. unable to participate today sec. confusion and weakness, pt. seen 2x this date with this flynn/l getting a little ot tx with ea. attempt!                    Respiratory Therapy Notes (last 24 hours) (Notes from 6/6/2018  3:07 PM through 6/7/2018  3:07 PM)     No notes of this type exist for this encounter.        "

## 2018-06-07 NOTE — PROGRESS NOTES
[facility] Medicine Services  INPATIENT PROGRESS NOTE    Length of Stay: 3  Date of Admission: 6/4/2018  Primary Care Physician: Magan Lopez MD    Subjective   Chief Complaint: Follow-up UTI  HPI   The patient is resting in bed with daughter at bedside. The patient tells me that she feels about the same today. Her itching and generalized pain have improved with medications. Her daughter tells me that prior to hospitalization she was working with therapy reasonably and was able to walk about 30 feet at a time. She has only sat up on the side of the bed since admission. She denies any shortness of breath or chest pain. She denies any nausea or vomiting. She has had 2 bowel movements today.     Review of Systems   All pertinent negatives and positives are as above. All other systems have been reviewed and are negative unless otherwise stated.     Objective    Temp:  [97.4 °F (36.3 °C)-98.4 °F (36.9 °C)] 98.4 °F (36.9 °C)  Heart Rate:  [61-78] 65  Resp:  [16-20] 20  BP: (141-162)/(59-82) 144/60  Physical Exam  Constitutional: She is oriented to person, place, and time. She appears well-developed and well-nourished. No distress.   HENT:   Head: Normocephalic and atraumatic.   Neck: Normal range of motion. Neck supple. No JVD present. No tracheal deviation present. No thyromegaly present.   Cardiovascular: Normal rate, regular rhythm, normal heart sounds and intact distal pulses.  Exam reveals no gallop and no friction rub.    No murmur heard.  Pulmonary/Chest: Effort normal and breath sounds normal. No respiratory distress. She has no wheezes. She has no rales.   Abdominal: Soft. Bowel sounds are normal. She exhibits no distention. There is tenderness (RUQ tenderness with palpation). There is no guarding.   Musculoskeletal: Normal range of motion. She exhibits no edema or tenderness.   Generalized weakness   Lymphadenopathy:     She has no cervical adenopathy.   Neurological:  She is  alert and oriented to person and place. No cranial nerve deficit.    Skin: Skin is warm and dry. No rash noted. No erythema.   Psychiatric: She has a normal mood and affect.   Vitals reviewed.    Results Review:  I have reviewed the labs, radiology results, and diagnostic studies.    Laboratory Data:     Results from last 7 days  Lab Units 06/07/18  0548 06/06/18  0634 06/05/18  0437   WBC 10*3/mm3 26.35* 28.70* 46.97*   HEMOGLOBIN g/dL 12.2 12.0 12.1   HEMATOCRIT % 38.1 37.9 38.0   PLATELETS 10*3/mm3 561* 538* 620*       Results from last 7 days  Lab Units 06/07/18  0548 06/06/18  0634 06/05/18  0437   SODIUM mmol/L 143 144 144   POTASSIUM mmol/L 4.3 4.0 4.0   CHLORIDE mmol/L 113* 112* 108   CO2 mmol/L 19.0* 22.0* 24.0   BUN mg/dL 15 19 28*   CREATININE mg/dL 1.05 1.26 1.59*   CALCIUM mg/dL 10.8* 10.1 10.1   BILIRUBIN mg/dL 0.8 0.7 0.7   ALK PHOS U/L 177* 186* 211*   ALT (SGPT) U/L 24 23 26   AST (SGOT) U/L 126* 135* 147*   GLUCOSE mg/dL 149* 118* 117*     I have reviewed the patient current medications.     Assessment/Plan   Assessment:  1. Left upper lobe patchy infiltrate concerning for pneumonia- healthcare associated. Repeat chest x-ray reveals resolution of the infiltrate  2. Urinary tract infection, urine culture with growth of Klebsiella pneumoniae  3. Acute metabolic encephalopathy- likely secondary to above  4. Acute kidney injury on chronic kidney disease- difficult to ascertain baseline kidney function.  Improved  5. Leukocytosis  6. Recent diagnosis of cholangicarcinoma  7. Clostridium Difficile colitis with recurrence  8. Dementia  9. Insulin dependent Diabetes Mellitus Type 2, hgb A1c 6.8%  10. Asplenic  11. Thrombocytosis    Plan:  1. Lactulose daily for now for continued elevated ammonia  2. Today will complete 3 days of Azactam for Klebsiella UTI  3. Discontinue Vancomycin as positive blood culture with staph species, not aureus is likely a contaminant. Repeat blood culture with no growth at less  than 24 hours. Continue to monitor  4. Continue IV fluids. Calcium slightly higher today at 10.8. Continue to monitor  5. Continue to hold HCTZ and Losartan for now.    6. Continue occupational/speech therapy. Patient has not yet been evaluated by physical therapy  7. Labs in AM- CBC, CMP, ammonia  8. Last blood glucoses- 155, 149, 123, 118. Continue current regimen and will add Levemir back at lower dosage    Discharge Planning: I expect the patient to be discharged to SNF in ? days.    JULI Malone   06/07/18   1:46 PM

## 2018-06-07 NOTE — PLAN OF CARE
"Problem: Patient Care Overview  Goal: Plan of Care Review  Outcome: Ongoing (interventions implemented as appropriate)   06/07/18 1118   Coping/Psychosocial   Plan of Care Reviewed With patient;daughter   OTHER   Outcome Summary Pt was oriented to place and name, but was unable to state the correct year or month. Pt needed maximum cues to state her and her daughter's birthday month. When asked questions throughout tx, pt frequently stated, \"I don't know.\" Pt became aggitated and appeared fatigued. Pt was able to identify 3/5 body parts on her face. Pt's daughter stated that pt had a rough night last night because she was up having frequent bowl movements. SLP educated family on the difficulties that the pt had during the cognitive-linguistic evaluaiton yesterday. SLP will continue to follow and treat pt until discharge.          "

## 2018-06-08 LAB
ALBUMIN SERPL-MCNC: 3.4 G/DL (ref 3.5–5)
ALBUMIN/GLOB SERPL: 0.9 G/DL (ref 1.1–2.5)
ALP SERPL-CCNC: 169 U/L (ref 24–120)
ALT SERPL W P-5'-P-CCNC: 26 U/L (ref 0–54)
AMMONIA BLD-SCNC: 49 UMOL/L (ref 9–33)
ANION GAP SERPL CALCULATED.3IONS-SCNC: 12 MMOL/L (ref 4–13)
ANISOCYTOSIS BLD QL: ABNORMAL
AST SERPL-CCNC: 130 U/L (ref 7–45)
BILIRUB SERPL-MCNC: 0.7 MG/DL (ref 0.1–1)
BUN BLD-MCNC: 15 MG/DL (ref 5–21)
BUN/CREAT SERPL: 14.4 (ref 7–25)
CALCIUM SPEC-SCNC: 11 MG/DL (ref 8.4–10.4)
CHLORIDE SERPL-SCNC: 114 MMOL/L (ref 98–110)
CO2 SERPL-SCNC: 19 MMOL/L (ref 24–31)
CREAT BLD-MCNC: 1.04 MG/DL (ref 0.5–1.4)
DEPRECATED RDW RBC AUTO: 55.3 FL (ref 40–54)
EOSINOPHIL # BLD MANUAL: 0.59 10*3/MM3 (ref 0–0.7)
EOSINOPHIL NFR BLD MANUAL: 2 % (ref 0–4)
ERYTHROCYTE [DISTWIDTH] IN BLOOD BY AUTOMATED COUNT: 17.3 % (ref 12–15)
GFR SERPL CREATININE-BSD FRML MDRD: 52 ML/MIN/1.73
GLOBULIN UR ELPH-MCNC: 3.9 GM/DL
GLUCOSE BLD-MCNC: 120 MG/DL (ref 70–100)
GLUCOSE BLDC GLUCOMTR-MCNC: 100 MG/DL (ref 70–130)
GLUCOSE BLDC GLUCOMTR-MCNC: 110 MG/DL (ref 70–130)
GLUCOSE BLDC GLUCOMTR-MCNC: 122 MG/DL (ref 70–130)
GLUCOSE BLDC GLUCOMTR-MCNC: 149 MG/DL (ref 70–130)
HCT VFR BLD AUTO: 41.1 % (ref 37–47)
HGB BLD-MCNC: 13 G/DL (ref 12–16)
LYMPHOCYTES # BLD MANUAL: 16.55 10*3/MM3 (ref 0.72–4.86)
LYMPHOCYTES NFR BLD MANUAL: 56 % (ref 15–45)
LYMPHOCYTES NFR BLD MANUAL: 7 % (ref 4–12)
MCH RBC QN AUTO: 28.2 PG (ref 28–32)
MCHC RBC AUTO-ENTMCNC: 31.6 G/DL (ref 33–36)
MCV RBC AUTO: 89.2 FL (ref 82–98)
MONOCYTES # BLD AUTO: 2.07 10*3/MM3 (ref 0.19–1.3)
NEUTROPHILS # BLD AUTO: 10.34 10*3/MM3 (ref 1.87–8.4)
NEUTROPHILS NFR BLD MANUAL: 34 % (ref 39–78)
NEUTS BAND NFR BLD MANUAL: 1 % (ref 0–10)
NRBC BLD MANUAL-RTO: 0.1 /100 WBC (ref 0–0)
PLATELET # BLD AUTO: 554 10*3/MM3 (ref 130–400)
PMV BLD AUTO: 11.3 FL (ref 6–12)
POTASSIUM BLD-SCNC: 4.5 MMOL/L (ref 3.5–5.3)
PROT SERPL-MCNC: 7.3 G/DL (ref 6.3–8.7)
RBC # BLD AUTO: 4.61 10*6/MM3 (ref 4.2–5.4)
SMALL PLATELETS BLD QL SMEAR: ABNORMAL
SMUDGE CELLS BLD QL SMEAR: ABNORMAL
SODIUM BLD-SCNC: 145 MMOL/L (ref 135–145)
WBC NRBC COR # BLD: 29.55 10*3/MM3 (ref 4.8–10.8)

## 2018-06-08 PROCEDURE — 80053 COMPREHEN METABOLIC PANEL: CPT | Performed by: NURSE PRACTITIONER

## 2018-06-08 PROCEDURE — 85007 BL SMEAR W/DIFF WBC COUNT: CPT | Performed by: NURSE PRACTITIONER

## 2018-06-08 PROCEDURE — 63710000001 INSULIN DETEMIR PER 5 UNITS: Performed by: NURSE PRACTITIONER

## 2018-06-08 PROCEDURE — 97535 SELF CARE MNGMENT TRAINING: CPT

## 2018-06-08 PROCEDURE — 85025 COMPLETE CBC W/AUTO DIFF WBC: CPT | Performed by: NURSE PRACTITIONER

## 2018-06-08 PROCEDURE — 82962 GLUCOSE BLOOD TEST: CPT

## 2018-06-08 PROCEDURE — 82140 ASSAY OF AMMONIA: CPT | Performed by: NURSE PRACTITIONER

## 2018-06-08 PROCEDURE — 25010000002 HEPARIN (PORCINE) PER 1000 UNITS: Performed by: FAMILY MEDICINE

## 2018-06-08 RX ADMIN — RIFAXIMIN 550 MG: 550 TABLET ORAL at 21:18

## 2018-06-08 RX ADMIN — AZTREONAM 1000 MG: 1 INJECTION, POWDER, LYOPHILIZED, FOR SOLUTION INTRAMUSCULAR; INTRAVENOUS at 21:17

## 2018-06-08 RX ADMIN — RIFAXIMIN 550 MG: 550 TABLET ORAL at 13:21

## 2018-06-08 RX ADMIN — Medication 1 SPRAY: at 18:59

## 2018-06-08 RX ADMIN — LACTULOSE 20 G: 20 SOLUTION ORAL at 09:23

## 2018-06-08 RX ADMIN — ATORVASTATIN CALCIUM 20 MG: 10 TABLET, FILM COATED ORAL at 09:23

## 2018-06-08 RX ADMIN — VANCOMYCIN HYDROCHLORIDE 125 MG: KIT at 05:40

## 2018-06-08 RX ADMIN — FAMOTIDINE 20 MG: 20 TABLET, FILM COATED ORAL at 09:23

## 2018-06-08 RX ADMIN — INSULIN DETEMIR 10 UNITS: 100 INJECTION, SOLUTION SUBCUTANEOUS at 09:23

## 2018-06-08 RX ADMIN — MEGESTROL ACETATE 800 MG: 40 SUSPENSION ORAL at 09:23

## 2018-06-08 RX ADMIN — HEPARIN SODIUM 5000 UNITS: 5000 INJECTION INTRAVENOUS; SUBCUTANEOUS at 18:59

## 2018-06-08 RX ADMIN — Medication 500 MG: at 21:17

## 2018-06-08 RX ADMIN — INSULIN DETEMIR 10 UNITS: 100 INJECTION, SOLUTION SUBCUTANEOUS at 21:19

## 2018-06-08 RX ADMIN — ESCITALOPRAM 20 MG: 10 TABLET, FILM COATED ORAL at 09:23

## 2018-06-08 RX ADMIN — MEMANTINE HYDROCHLORIDE 10 MG: 5 TABLET, FILM COATED ORAL at 09:23

## 2018-06-08 RX ADMIN — VANCOMYCIN HYDROCHLORIDE 125 MG: KIT at 00:00

## 2018-06-08 RX ADMIN — MEMANTINE HYDROCHLORIDE 10 MG: 5 TABLET, FILM COATED ORAL at 21:18

## 2018-06-08 RX ADMIN — VANCOMYCIN HYDROCHLORIDE 125 MG: KIT at 13:20

## 2018-06-08 RX ADMIN — Medication 1 SPRAY: at 21:22

## 2018-06-08 RX ADMIN — HEPARIN SODIUM 5000 UNITS: 5000 INJECTION INTRAVENOUS; SUBCUTANEOUS at 05:39

## 2018-06-08 RX ADMIN — TRAMADOL HYDROCHLORIDE 25 MG: 50 TABLET, COATED ORAL at 05:52

## 2018-06-08 RX ADMIN — SODIUM CHLORIDE 75 ML/HR: 9 INJECTION, SOLUTION INTRAVENOUS at 05:54

## 2018-06-08 RX ADMIN — SODIUM CHLORIDE 75 ML/HR: 9 INJECTION, SOLUTION INTRAVENOUS at 19:29

## 2018-06-08 RX ADMIN — AMLODIPINE BESYLATE 5 MG: 5 TABLET ORAL at 09:23

## 2018-06-08 RX ADMIN — AZTREONAM 1000 MG: 1 INJECTION, POWDER, LYOPHILIZED, FOR SOLUTION INTRAMUSCULAR; INTRAVENOUS at 16:01

## 2018-06-08 RX ADMIN — VANCOMYCIN HYDROCHLORIDE 125 MG: KIT at 21:19

## 2018-06-08 RX ADMIN — Medication 500 MG: at 09:23

## 2018-06-08 RX ADMIN — DONEPEZIL HYDROCHLORIDE 10 MG: 10 TABLET, FILM COATED ORAL at 21:18

## 2018-06-08 NOTE — PROGRESS NOTES
Continued Stay Note   [facility]     Patient Name: Bindu Fountain  MRN: [id number]  Today's Date: 6/8/2018    Admit Date: 6/4/2018          Discharge Plan     Row Name 06/08/18 1412       Plan    Plan Comments WAITING ON ANSWER FROM [facility]. ALL OTHERS HAVE DECLINED.               Discharge Codes    No documentation.           FIOR Castillo

## 2018-06-08 NOTE — PLAN OF CARE
Problem: Patient Care Overview  Goal: Plan of Care Review  Outcome: Ongoing (interventions implemented as appropriate)   06/08/18 0314   Coping/Psychosocial   Plan of Care Reviewed With patient;daughter   Plan of Care Review   Progress no change   OTHER   Outcome Summary ammonia level trending up; Incontinent; turn Q 2hrs; bed alarm on; no c/o nausea       Problem: Fall Risk (Adult)  Goal: Absence of Fall  Outcome: Ongoing (interventions implemented as appropriate)   06/08/18 0314   Fall Risk (Adult)   Absence of Fall making progress toward outcome       Problem: Skin Injury Risk (Adult)  Goal: Skin Health and Integrity  Outcome: Ongoing (interventions implemented as appropriate)   06/08/18 0314   Skin Injury Risk (Adult)   Skin Health and Integrity making progress toward outcome

## 2018-06-08 NOTE — PROGRESS NOTES
Continued Stay Note  [facility]     Patient Name: Bindu Fountain  MRN: [id number]  Today's Date: 6/8/2018    Admit Date: 6/4/2018          Discharge Plan     Row Name 06/08/18 3646       Plan    Plan Comments [facility] DID OFFER A BED. FAMILY HAS TOURED FACILITY. WAITING ON ANSWER FROM FAMILY. [facility] CARE HAS BEEN CONSULTED. WILL FOLLOW.     Row Name 06/08/18 1412       Plan    Plan Comments WAITING ON ANSWER FROM [facility]. ALL OTHERS HAVE DECLINED.               Discharge Codes    No documentation.           FIOR Castillo

## 2018-06-08 NOTE — CONSULTS
"Palliative Care Initial Consult   Attending Physician: Darian Garnica MD  Referring Provider: Darian Garnica MD    Reason for Referral: assistance with clarification of goals of care, hospice referral or discussion and Other symptom management, and Patient/family support    Code Status: Full Code   Advanced Directives: Advance Directive Status: Patient does not have advance directive   Family/Support: Conchis Hernandez at bedside   Goals of Care: TBD.    HPI:   73 y.o. female with past medical history significant for cholangicarcinoma recently diagnosed in May 2018, followed by Dr. Ramirez, oncology; recurrent C. Diff, Dementia on aricept and namenda, Insulin dependent diabetes, Chronic kidney disease, hypertension, and hyperlipidemia. Patient presented to [facility] on 6/4/2018 related to altered mental status. Recent hospitalization 5/4/2018-5/23/2018 related to pneumonia symptoms with findings of cholangiocarcinoma after liver biopsy. Palliative Care Spoke With: family reports patient's quality of life has significantly been affected after recent diagnosis of cholangicarcinoma and feels her \"mom has just given up\". Daughter reports patient's functional status has declined over the last 3 weeks and substantially over the last few days prior to this hospitalization. Informed patient has 4 daughters, however, son-in-law Javi Zendejas is the designated POA for healthcare. Spoke with Javi Zendejas via telephone and he confirms and plans to bring in paperwork to scan into patient's medical records. Palliative Care Topics Discussed: palliative care, goals of care, care options, resuscitation status, [facility] and discharge options  Conchis Hernandez verbalized understanding of the patient's diagnosis of cancer and limited treatment options, decline in mental status as a result of elevated ammonia levels and UTI. POA/Son-in-law reports he has discussed cardiopulmonary resuscitative measures with the " patient during her recent admission to [facility] and that the patient stated she would want all aggressive measures. We discussed the patient's lack of capacity for medical decision making and POA feels this is in line with patient's and family's wishes at this time but realistic that they may have to address goals if the patient continues to decline. We discussed the medical orders for scope of treatment and a copy has been provided.     ROS:  General: Negative Anorexia, Drowsiness/Insomnia, Positive Fatigue, Weakness  HEENT: Negative Dry eyes, Excessive Tearing, Dysphagia, Positive Xerostomia  CV: Negative Chest Pain, LE Swelling  Lungs: Negative Dyspnea, Cough  GI/Abd: Last BM today  l  Negative Nausea/Vomiting, Abdominal Pain, Constipation/Diarrhea, Appetite   : Negative Urgency, Frequency, Incontinence, Positive Dysuria-intermittent  MS: Negative Bone Pain, Joint Pain, Muscle Pain, Tripping, Falls  Skin: Negative Pruritus, Decubitus Ulcers, Dry Skin, Rash      Neurological: Negative Agitation, Sedation, Neuropathy, Positive Delirium  Psychology: Negative Anxiety, Depression, Hallucinations    Endocrine: Negative Cold/Heat Intolerance  All/Imm: Negative Immunosuppression, Neutropenia  Hem/Lym: Negative Bruising, Bleeding, Lymphedema, Lymphadenopathy      Past Medical History:   Diagnosis Date   • Cancer     liver   • Dementia    • Diabetes    • Dysphagia    • High cholesterol    • Hypertension    • Laryngopharyngeal reflux    • Lingual tonsil hypertrophy    • Thyroid nodule      Past Surgical History:   Procedure Laterality Date   • APPENDECTOMY     • CHOLECYSTECTOMY     • FINE NEEDLE ASPIRATION     • KNEE SURGERY     • SPLENECTOMY     • TONSILLECTOMY       Social History     Social History   • Marital status:      Spouse name: N/A   • Number of children: N/A   • Years of education: N/A     Occupational History   • Not on file.     Social History Main Topics   • Smoking status: Former Smoker     Quit  "date: 2012   • Smokeless tobacco: Never Used      Comment: 2012   • Alcohol use No   • Drug use: Unknown   • Sexual activity: Defer     Other Topics Concern   • Not on file     Social History Narrative   • No narrative on file       Allergies   Allergen Reactions   • Cephalexin Shortness Of Breath   • Oxycodone Other (See Comments)     Caused PT to jerk uncontrollably.     • Sulfa Antibiotics Shortness Of Breath   • Sulfamethoxazole-Trimethoprim Shortness Of Breath       Current medication reviewed for route, type, dose and frequency and are current per MAR at time of dictation.      /75 (BP Location: Left arm, Patient Position: Lying)   Pulse 71   Temp 97.2 °F (36.2 °C) (Oral)   Resp 18   Ht 167.6 cm (65.98\")   Wt 89.9 kg (198 lb 1.6 oz)   SpO2 95%   BMI 31.99 kg/m²     Diagnostics: Reviewed    Patient Active Problem List   Diagnosis   • Thrombocytosis after splenectomy   • Urinary tract infection without hematuria   • Chronic cystitis   • Renal cyst   • Pneumonia symptoms       Physical Exam:  General Appearance: alert, appears stated age, cooperative, overweight and somnolence  Head: normocephalic, without obvious abnormality and atraumatic  Eyes: lids and lashes normal, conjunctivae and sclerae normal, no icterus, no pallor, corneas clear and PERRLA  Throat: dry mucosa  Lungs: clear to auscultation, respirations regular, respirations even and respirations unlabored  Heart: regular rhythm & normal rate and normal S1, S2  Abdomen: normal bowel sounds, soft non-tender and no guarding  Extremities: moves extremities well, no cyanosis, no redness and trace edema  Pulses: Pulses palpable and equal bilaterally  Skin: no bleeding, bruising or rash  Neurologic: Mental Status alertness somnolence, Speech normal content and execusion and alert to self, Reflexes normal and symmetric  Psych: flat      Patient status: Disease state: Controlled with current treatments.  Functional status: Palliative Performance " Scale Score: Performance 40% based on the following measures: Ambulation: Mainly in bed, Activity and Evidence of Disease: Unable to do any work, extensive evidence of disease, Self-Care: Mainly assistance required,  Intake: Normal or reduced, LOC: Full, drowsy or confusion   ECOG Status(3) Capable of limited self-care, confined to bed or chair > 50% of waking hours.  Mental status: somnolence.  Nutritional status: no nutritional compromise at this time. Body mass index is 31.99 kg/m².  Psychosocial issues: Daughters x 4, POA for healthcare identified as son-in-law-who plans to bring in approprate paperwork to scan into medical records.  Family support: The patient receives support from her daughter and extended family..  POA/Healthcare surrogate-awaiting POA documentation as described above    Impression/Problem List:    1. cholangicarcinoma recently diagnosed in May 2018, followed by Dr. Ramirez, oncology  2. UTI  3. Dementia on aricept and namenda  4. History of recurrent C. Diff,   5. Insulin dependent diabetes  6. Acute kidney failure with baseline unknown Chronic kidney disease  7. Hypertension  8. Hyperlipidemia    Recommendations/Plan:  1. plan: Goals of care include CODE STATUS-Full Code.   -Family to provide POA paperwork  -Family meeting Sunday with Dr. Garnica 1:30 or 2 PM    2. Xerostomia  -biotene spray as ordered        Thank you for this consult and allowing us to participate in patient's plan of care. Palliative Care Team will continue to follow patient.     Time spent: 110 minutes spent reviewing medical and medication records, assessing and examining patient, discussing with family, answering questions, providing some guidance about a plan and documentation of care, and coordinating care with other healthcare members, with > 50% time spent face to face.     Krysta Wright, APRN  6/8/2018

## 2018-06-08 NOTE — THERAPY TREATMENT NOTE
Acute Care - Occupational Therapy Treatment Note  [facility]     Patient Name: Bindu Fountain  : 1945  MRN: [id number]  Today's Date: 2018  Onset of Illness/Injury or Date of Surgery: 18  Date of Referral to OT: 18  Referring Physician: Ara BUSTOS    Admit Date: 2018       ICD-10-CM ICD-9-CM   1. Urinary tract infection without hematuria, site unspecified N39.0 599.0   2. Pneumonia due to infectious organism, unspecified laterality, unspecified part of lung J18.9 136.9     484.8   3. Altered mental status, unspecified altered mental status type R41.82 780.97   4. Clostridium difficile infection B96.89 041.84   5. Oral phase dysphagia R13.11 787.21   6. Decreased activities of daily living (ADL) Z78.9 V49.89   7. Impaired cognition R41.89 294.9     Patient Active Problem List   Diagnosis   • Thrombocytosis after splenectomy   • Urinary tract infection without hematuria   • Chronic cystitis   • Renal cyst   • Pneumonia symptoms     Past Medical History:   Diagnosis Date   • Cancer     liver   • Dementia    • Diabetes    • Dysphagia    • High cholesterol    • Hypertension    • Laryngopharyngeal reflux    • Lingual tonsil hypertrophy    • Thyroid nodule      Past Surgical History:   Procedure Laterality Date   • APPENDECTOMY     • CHOLECYSTECTOMY     • FINE NEEDLE ASPIRATION     • KNEE SURGERY     • SPLENECTOMY     • TONSILLECTOMY         Therapy Treatment          Rehabilitation Treatment Summary     Row Name 18 1341             Treatment Time/Intention    Discipline occupational therapy assistant  -TS      Document Type therapy note (daily note)  -TS      Subjective Information complains of;weakness;fatigue  -TS2      Patient Effort good  -TS2      Existing Precautions/Restrictions fall  -TS2      Treatment Considerations/Comments C diff  -TS2      Recorded by [TS] RAIMUNDO Ta 18 1356  [TS2] RAIMUNDO Ta 18 1459      Row Name 18  1341             Cognitive Assessment/Intervention- PT/OT    Personal Safety Interventions elopement precautions initiated;fall prevention program maintained;gait belt;nonskid shoes/slippers when out of bed  -TS      Recorded by [TS] DARCI Ta/L 06/08/18 1459      Row Name 06/08/18 1341             Bed Mobility Assessment/Treatment    Bed Mobility Assessment/Treatment supine-sit;scooting/bridging  -TS      Scooting/Bridging [place] (Bed Mobility) moderate assist (50% patient effort)  -TS      Supine-Sit [place] (Bed Mobility) moderate assist (50% patient effort)  -TS      Assistive Device (Bed Mobility) bed rails;head of bed elevated;draw sheet  -TS      Recorded by [TS] DARCI Ta/L 06/08/18 1459      Row Name 06/08/18 1341             Functional Mobility    Functional Mobility- Ind. Level minimum assist (75% patient effort);moderate assist (50% patient effort)  -TS      Functional Mobility- Device rolling walker  -TS      Functional Mobility- Comment in room  -TS      Recorded by [TS] DARCI Ta/L 06/08/18 1459      Row Name 06/08/18 1341             Transfer Assessment/Treatment    Transfer Assessment/Treatment sit-stand transfer;stand-sit transfer;toilet transfer  -TS      Sit-Stand [place] (Transfers) minimum assist (75% patient effort);moderate assist (50% patient effort)  -TS      Stand-Sit [place] (Transfers) minimum assist (75% patient effort);moderate assist (50% patient effort)  -TS      [place] Level (Toilet Transfer) moderate assist (50% patient effort)  -TS      Assistive Device (Toilet Transfer) commode, bedside without drop arms;walker, front-wheeled  -TS      Recorded by [TS] DARCI Ta/L 06/08/18 1459      Row Name 06/08/18 1341             Sit-Stand Transfer    Assistive Device (Sit-Stand Transfers) walker, front-wheeled  -TS      Recorded by [TS] PAUL TaA/L 06/08/18 1459      Row Name 06/08/18  1341             Stand-Sit Transfer    Assistive Device (Stand-Sit Transfers) walker, front-wheeled  -TS      Recorded by [TS] DARCI Ta/L 06/08/18 1459      Row Name 06/08/18 1341             Toilet Transfer    Type (Toilet Transfer) sit-stand;stand-sit  -TS      Recorded by [TS] DARCI Ta/L 06/08/18 1459      Row Name 06/08/18 1341             ADL Assessment/Intervention    BADL Assessment/Intervention toileting;lower body dressing  -TS      Recorded by [TS] DARCI Ta/L 06/08/18 1459      Row Name 06/08/18 1341             Lower Body Dressing Assessment/Training    Lower Body Dressing [place] Level don;socks;undergarment;maximum assist (25% patient effort)  -TS      Lower Body Dressing Position supported sitting;supported standing  -TS      Recorded by [TS] DARCI Ta/L 06/08/18 1459      Row Name 06/08/18 1341             Toileting Assessment/Training    [place] Level (Toileting) toileting skills;perform perineal hygiene;adjust/manage clothing;maximum assist (25% patient effort)  -TS      Assistive Devices (Toileting) commode, bedside without drop arms  -TS      Toileting Position supported sitting;supported standing  -TS      Recorded by [TS] DARCI Ta/L 06/08/18 1459      Row Name 06/08/18 1341             Positioning and Restraints    Pre-Treatment Position in bed  -TS      Post Treatment Position chair  -TS      In Chair reclined;call light within reach;encouraged to call for assist;with family/caregiver  -TS      Recorded by [TS] DARCI Ta/L 06/08/18 1459      Row Name 06/08/18 1341             Pain Scale: Numbers Pre/Post-Treatment    Pain Scale: Numbers, Pretreatment 0/10 - no pain  -TS      Recorded by [TS] DARCI Ta/MARIELA 06/08/18 1459        User Key  (r) = Recorded By, (t) = Taken By, (c) = Cosigned By    Initials Name Effective Dates Discipline    TS RAIMUNDO Ta 08/02/16  -  OT               Occupational Therapy Education     Title: PT OT SLP Therapies (Active)     Topic: Occupational Therapy (Active)     Point: ADL training (Done)     Description: Instruct learner(s) on proper safety adaptation and remediation techniques during self care or transfers.   Instruct in proper use of assistive devices.   Learning Progress Summary     Learner Status Readiness Method Response Comment Documented by    Patient Done Acceptance E,TB VU transfers, benefits of activity, ADLs  06/08/18 1500     Done Acceptance E,TB VU OT POC, benefits of activity AC 06/05/18 1455    Family Done Acceptance E,TB VU OT POC, benefits of activity AC 06/05/18 1455          Point: Home exercise program (Active)     Description: Instruct learner(s) on appropriate technique for monitoring, assisting and/or progressing therapeutic exercises/activities.   Learning Progress Summary     Learner Status Readiness Method Response Comment Documented by    Patient Active Acceptance E,D NR Pt. required min. assist from this flynn/l and family to roll side/side for scooting up in bed! Pt. attempted to perform ue exs, but ubable to follow instructions, Pt. seen 2x this date to attempt tx, pts. paticipation ability poor today!  06/06/18 1503     Done Acceptance E,TB VU OT POC, benefits of activity  06/05/18 1455    Family Active Acceptance E,D NR Pt. required min. assist from this flynn/l and family to roll side/side for scooting up in bed! Pt. attempted to perform ue exs, but ubable to follow instructions, Pt. seen 2x this date to attempt tx, pts. paticipation ability poor today!  06/06/18 1503     Done Acceptance E,TB VU OT POC, benefits of activity  06/05/18 1455                      User Key     Initials Effective Dates Name Provider Type Discipline     06/22/15 -  Venkat Flores, OTR/L Occupational Therapist OT     08/02/16 -  Esteban Carrasco FLYNN/L Occupational Therapy Assistant OT     08/02/16 -  Anu WILSON  DARCI Herrera/L Occupational Therapy Assistant OT                OT Recommendation and Plan     Plan of Care Review  Plan of Care Reviewed With: patient  Plan of Care Reviewed With: patient  Outcome Summary: Pt mod A for bed mobility. Pt transfers from EOB and BSC with min/mod A but demonstrates fatigue toward end of task. Pt max A for LB dressing and toileting. Pt was able to transfer from EOB to BSC and BSC to recliner. Pt would benefit SNF at discharge. Continue OT POC         Outcome Measures     Row Name 06/08/18 1400 06/06/18 1415          How much help from another is currently needed...    Putting on and taking off regular lower body clothing? 2  -TS 2  -CJ     Bathing (including washing, rinsing, and drying) 2  -TS 2  -CJ     Toileting (which includes using toilet bed pan or urinal) 2  -TS 2  -CJ     Putting on and taking off regular upper body clothing 3  -TS 2  -CJ     Taking care of personal grooming (such as brushing teeth) 3  -TS 2  -CJ     Eating meals 3  -TS 3  -CJ     Score 15  -TS 13  -CJ        Functional Assessment    Outcome Measure Options AM-PAC 6 Clicks Daily Activity (OT)  -TS AM-PAC 6 Clicks Daily Activity (OT)  -CJ       User Key  (r) = Recorded By, (t) = Taken By, (c) = Cosigned By    Initials Name Provider Type     DARCI Sorensen/MARIELA Occupational Therapy Assistant    RAIMUNDO Hopkins Occupational Therapy Assistant           Time Calculation:         Time Calculation- OT     Row Name 06/08/18 1504             Time Calculation- OT    OT Start Time 1341  -TS      OT Stop Time 1450  -TS      OT Time Calculation (min) 69 min  -TS      Total Timed Code Minutes- OT 69 minute(s)  -TS      OT Received On 06/08/18  -TS        User Key  (r) = Recorded By, (t) = Taken By, (c) = Cosigned By    Initials Name Provider Type    DARCI Hopkins/L Occupational Therapy Assistant           Therapy Charges for Today     Code Description Service Date Service Provider  Modifiers Qty    36343601499 HC OT SELF CARE/MGMT/TRAIN EA 15 MIN 6/8/2018 DARCI Ta/L GO, KX 5          OT G-codes  OT Professional Judgement Used?: Yes  OT Functional Scales Options: AM-PAC 6 Clicks Daily Activity (OT)  Score: 13  Functional Limitation: Self care  Self Care Current Status (): At least 60 percent but less than 80 percent impaired, limited or restricted  Self Care Goal Status (): At least 40 percent but less than 60 percent impaired, limited or restricted    RAIMUNDO Garzon  6/8/2018

## 2018-06-08 NOTE — PLAN OF CARE
Problem: Patient Care Overview  Goal: Plan of Care Review  Outcome: Ongoing (interventions implemented as appropriate)   06/08/18 1414   Coping/Psychosocial   Plan of Care Reviewed With patient;daughter   Plan of Care Review   Progress no change   OTHER   Outcome Summary Patient denies pain/nausea. Tolerating diet. Daughter feeds the patient all her meals. Cont to be incont urine/stool. Turn q2. Patient ivf, iv abx, and oral abx cont. Accu checks ac/hs. Will cont to monitor.      Goal: Individualization and Mutuality  Outcome: Ongoing (interventions implemented as appropriate)    Goal: Discharge Needs Assessment  Outcome: Ongoing (interventions implemented as appropriate)      Problem: Fall Risk (Adult)  Goal: Absence of Fall  Outcome: Ongoing (interventions implemented as appropriate)   06/08/18 1414   Fall Risk (Adult)   Absence of Fall making progress toward outcome       Problem: Skin Injury Risk (Adult)  Goal: Skin Health and Integrity  Outcome: Ongoing (interventions implemented as appropriate)   06/08/18 1414   Skin Injury Risk (Adult)   Skin Health and Integrity making progress toward outcome       Problem: Infection, Risk/Actual (Adult)  Goal: Infection Prevention/Resolution  Outcome: Ongoing (interventions implemented as appropriate)   06/08/18 1414   Infection, Risk/Actual (Adult)   Infection Prevention/Resolution making progress toward outcome

## 2018-06-08 NOTE — PLAN OF CARE
Problem: Patient Care Overview  Goal: Plan of Care Review  Outcome: Ongoing (interventions implemented as appropriate)   06/08/18 1500   Coping/Psychosocial   Plan of Care Reviewed With patient   Plan of Care Review   Progress improving   OTHER   Outcome Summary Pt mod A for bed mobility. Pt transfers from EOB and BSC with min/mod A but demonstrates fatigue toward end of task. Pt max A for LB dressing and toileting. Pt was able to transfer from EOB to BSC and BSC to recliner. Pt would benefit SNF at discharge. Continue OT POC

## 2018-06-09 LAB
ALBUMIN SERPL-MCNC: 3.2 G/DL (ref 3.5–5)
ALBUMIN/GLOB SERPL: 0.9 G/DL (ref 1.1–2.5)
ALP SERPL-CCNC: 174 U/L (ref 24–120)
ALT SERPL W P-5'-P-CCNC: 23 U/L (ref 0–54)
ANION GAP SERPL CALCULATED.3IONS-SCNC: 12 MMOL/L (ref 4–13)
AST SERPL-CCNC: 128 U/L (ref 7–45)
BACTERIA SPEC AEROBE CULT: NORMAL
BILIRUB SERPL-MCNC: 0.5 MG/DL (ref 0.1–1)
BUN BLD-MCNC: 12 MG/DL (ref 5–21)
BUN/CREAT SERPL: 12.4 (ref 7–25)
CALCIUM SPEC-SCNC: 11 MG/DL (ref 8.4–10.4)
CHLORIDE SERPL-SCNC: 114 MMOL/L (ref 98–110)
CO2 SERPL-SCNC: 18 MMOL/L (ref 24–31)
CREAT BLD-MCNC: 0.97 MG/DL (ref 0.5–1.4)
GFR SERPL CREATININE-BSD FRML MDRD: 56 ML/MIN/1.73
GLOBULIN UR ELPH-MCNC: 3.7 GM/DL
GLUCOSE BLD-MCNC: 131 MG/DL (ref 70–100)
GLUCOSE BLDC GLUCOMTR-MCNC: 127 MG/DL (ref 70–130)
GLUCOSE BLDC GLUCOMTR-MCNC: 129 MG/DL (ref 70–130)
GLUCOSE BLDC GLUCOMTR-MCNC: 173 MG/DL (ref 70–130)
GLUCOSE BLDC GLUCOMTR-MCNC: 80 MG/DL (ref 70–130)
POTASSIUM BLD-SCNC: 3.6 MMOL/L (ref 3.5–5.3)
PROT SERPL-MCNC: 6.9 G/DL (ref 6.3–8.7)
SODIUM BLD-SCNC: 144 MMOL/L (ref 135–145)

## 2018-06-09 PROCEDURE — 97162 PT EVAL MOD COMPLEX 30 MIN: CPT | Performed by: PHYSICAL THERAPIST

## 2018-06-09 PROCEDURE — 63710000001 INSULIN DETEMIR PER 5 UNITS: Performed by: NURSE PRACTITIONER

## 2018-06-09 PROCEDURE — 25010000002 HEPARIN (PORCINE) PER 1000 UNITS: Performed by: FAMILY MEDICINE

## 2018-06-09 PROCEDURE — 80053 COMPREHEN METABOLIC PANEL: CPT | Performed by: NURSE PRACTITIONER

## 2018-06-09 PROCEDURE — G8978 MOBILITY CURRENT STATUS: HCPCS | Performed by: PHYSICAL THERAPIST

## 2018-06-09 PROCEDURE — 82962 GLUCOSE BLOOD TEST: CPT

## 2018-06-09 PROCEDURE — G8979 MOBILITY GOAL STATUS: HCPCS | Performed by: PHYSICAL THERAPIST

## 2018-06-09 PROCEDURE — 25010000002 ZOLEDRONIC ACID 4 MG/100ML SOLUTION: Performed by: NURSE PRACTITIONER

## 2018-06-09 PROCEDURE — 63710000001 INSULIN LISPRO (HUMAN) PER 5 UNITS: Performed by: NURSE PRACTITIONER

## 2018-06-09 RX ORDER — LOSARTAN POTASSIUM 50 MG/1
100 TABLET ORAL DAILY
Status: DISCONTINUED | OUTPATIENT
Start: 2018-06-10 | End: 2018-06-12 | Stop reason: HOSPADM

## 2018-06-09 RX ORDER — ZOLEDRONIC ACID 0.04 MG/ML
4 INJECTION, SOLUTION INTRAVENOUS ONCE
Status: COMPLETED | OUTPATIENT
Start: 2018-06-09 | End: 2018-06-09

## 2018-06-09 RX ORDER — LOSARTAN POTASSIUM 50 MG/1
100 TABLET ORAL DAILY
Status: DISCONTINUED | OUTPATIENT
Start: 2018-06-09 | End: 2018-06-09

## 2018-06-09 RX ADMIN — VANCOMYCIN HYDROCHLORIDE 125 MG: KIT at 21:35

## 2018-06-09 RX ADMIN — LACTULOSE 20 G: 20 SOLUTION ORAL at 08:05

## 2018-06-09 RX ADMIN — RIFAXIMIN 550 MG: 550 TABLET ORAL at 21:36

## 2018-06-09 RX ADMIN — INSULIN LISPRO 2 UNITS: 100 INJECTION, SOLUTION INTRAVENOUS; SUBCUTANEOUS at 17:14

## 2018-06-09 RX ADMIN — MEMANTINE HYDROCHLORIDE 10 MG: 5 TABLET, FILM COATED ORAL at 21:36

## 2018-06-09 RX ADMIN — Medication 1 SPRAY: at 21:38

## 2018-06-09 RX ADMIN — INSULIN DETEMIR 10 UNITS: 100 INJECTION, SOLUTION SUBCUTANEOUS at 21:36

## 2018-06-09 RX ADMIN — AZTREONAM 1000 MG: 1 INJECTION, POWDER, LYOPHILIZED, FOR SOLUTION INTRAMUSCULAR; INTRAVENOUS at 06:11

## 2018-06-09 RX ADMIN — MEGESTROL ACETATE 800 MG: 40 SUSPENSION ORAL at 08:05

## 2018-06-09 RX ADMIN — HEPARIN SODIUM 5000 UNITS: 5000 INJECTION INTRAVENOUS; SUBCUTANEOUS at 06:11

## 2018-06-09 RX ADMIN — RIFAXIMIN 550 MG: 550 TABLET ORAL at 08:05

## 2018-06-09 RX ADMIN — FUROSEMIDE 20 MG: 20 TABLET ORAL at 08:05

## 2018-06-09 RX ADMIN — AMLODIPINE BESYLATE 5 MG: 5 TABLET ORAL at 08:05

## 2018-06-09 RX ADMIN — VANCOMYCIN HYDROCHLORIDE 125 MG: KIT at 04:47

## 2018-06-09 RX ADMIN — SODIUM CHLORIDE 75 ML/HR: 9 INJECTION, SOLUTION INTRAVENOUS at 08:01

## 2018-06-09 RX ADMIN — Medication 1 SPRAY: at 06:12

## 2018-06-09 RX ADMIN — HEPARIN SODIUM 5000 UNITS: 5000 INJECTION INTRAVENOUS; SUBCUTANEOUS at 17:09

## 2018-06-09 RX ADMIN — Medication 500 MG: at 21:36

## 2018-06-09 RX ADMIN — ESCITALOPRAM 20 MG: 10 TABLET, FILM COATED ORAL at 08:05

## 2018-06-09 RX ADMIN — Medication 500 MG: at 08:05

## 2018-06-09 RX ADMIN — ATORVASTATIN CALCIUM 20 MG: 10 TABLET, FILM COATED ORAL at 08:05

## 2018-06-09 RX ADMIN — ZOLEDRONIC ACID 4 MG: 0.04 INJECTION, SOLUTION INTRAVENOUS at 15:24

## 2018-06-09 RX ADMIN — Medication 1 SPRAY: at 11:43

## 2018-06-09 RX ADMIN — INSULIN DETEMIR 10 UNITS: 100 INJECTION, SOLUTION SUBCUTANEOUS at 08:08

## 2018-06-09 RX ADMIN — AZTREONAM 1000 MG: 1 INJECTION, POWDER, LYOPHILIZED, FOR SOLUTION INTRAMUSCULAR; INTRAVENOUS at 21:35

## 2018-06-09 RX ADMIN — MEMANTINE HYDROCHLORIDE 10 MG: 5 TABLET, FILM COATED ORAL at 08:05

## 2018-06-09 RX ADMIN — VANCOMYCIN HYDROCHLORIDE 125 MG: KIT at 09:25

## 2018-06-09 RX ADMIN — VANCOMYCIN HYDROCHLORIDE 125 MG: KIT at 16:09

## 2018-06-09 RX ADMIN — FAMOTIDINE 20 MG: 20 TABLET, FILM COATED ORAL at 08:05

## 2018-06-09 RX ADMIN — DONEPEZIL HYDROCHLORIDE 10 MG: 10 TABLET, FILM COATED ORAL at 21:36

## 2018-06-09 RX ADMIN — AZTREONAM 1000 MG: 1 INJECTION, POWDER, LYOPHILIZED, FOR SOLUTION INTRAMUSCULAR; INTRAVENOUS at 13:42

## 2018-06-09 NOTE — PLAN OF CARE
Problem: Patient Care Overview  Goal: Plan of Care Review  Outcome: Ongoing (interventions implemented as appropriate)   06/09/18 8633   Coping/Psychosocial   Plan of Care Reviewed With patient   Plan of Care Review   Progress improving   OTHER   Outcome Summary Pt seems improved from yesterday per physician. No c/o pain. Diarrhea continues, lactulose continues. Continue to monitor. NSR on tele.      Goal: Individualization and Mutuality  Outcome: Ongoing (interventions implemented as appropriate)    Goal: Discharge Needs Assessment  Outcome: Ongoing (interventions implemented as appropriate)      Problem: Fall Risk (Adult)  Goal: Absence of Fall  Outcome: Ongoing (interventions implemented as appropriate)      Problem: Skin Injury Risk (Adult)  Goal: Skin Health and Integrity  Outcome: Ongoing (interventions implemented as appropriate)      Problem: Infection, Risk/Actual (Adult)  Goal: Infection Prevention/Resolution  Outcome: Ongoing (interventions implemented as appropriate)

## 2018-06-09 NOTE — PLAN OF CARE
Problem: Patient Care Overview  Goal: Plan of Care Review  Outcome: Ongoing (interventions implemented as appropriate)   06/08/18 2125 06/09/18 0328   Coping/Psychosocial   Plan of Care Reviewed With patient --    Plan of Care Review   Progress --  no change   OTHER   Outcome Summary --  Pt denies pain and nausea at this time, incontinent of bowel and bladder, up in chair most of night, IVF cont, will cont to monitor.      Goal: Individualization and Mutuality  Outcome: Ongoing (interventions implemented as appropriate)    Goal: Discharge Needs Assessment  Outcome: Ongoing (interventions implemented as appropriate)    Goal: Interprofessional Rounds/Family Conf  Outcome: Ongoing (interventions implemented as appropriate)      Problem: Fall Risk (Adult)  Goal: Absence of Fall  Outcome: Ongoing (interventions implemented as appropriate)      Problem: Skin Injury Risk (Adult)  Goal: Skin Health and Integrity  Outcome: Ongoing (interventions implemented as appropriate)      Problem: Infection, Risk/Actual (Adult)  Goal: Infection Prevention/Resolution  Outcome: Ongoing (interventions implemented as appropriate)

## 2018-06-09 NOTE — THERAPY EVALUATION
Acute Care - Physical Therapy Initial Evaluation  [facility]     Patient Name: Bindu Fountain  : 1945  MRN: [id number]  Today's Date: 2018   Onset of Illness/Injury or Date of Surgery: 18  Date of Referral to PT: 18  Referring Physician: JULI Quevedo      Admit Date: 2018    Visit Dx:     ICD-10-CM ICD-9-CM   1. Urinary tract infection without hematuria, site unspecified N39.0 599.0   2. Pneumonia due to infectious organism, unspecified laterality, unspecified part of lung J18.9 136.9     484.8   3. Altered mental status, unspecified altered mental status type R41.82 780.97   4. Clostridium difficile infection B96.89 041.84   5. Oral phase dysphagia R13.11 787.21   6. Decreased activities of daily living (ADL) Z78.9 V49.89   7. Impaired cognition R41.89 294.9   8. Impaired mobility and endurance Z74.09 V49.89     Patient Active Problem List   Diagnosis   • Thrombocytosis after splenectomy   • Urinary tract infection without hematuria   • Chronic cystitis   • Renal cyst   • Pneumonia symptoms     Past Medical History:   Diagnosis Date   • Cancer     liver   • Dementia    • Diabetes    • Dysphagia    • High cholesterol    • Hypertension    • Laryngopharyngeal reflux    • Lingual tonsil hypertrophy    • Thyroid nodule      Past Surgical History:   Procedure Laterality Date   • APPENDECTOMY     • CHOLECYSTECTOMY     • FINE NEEDLE ASPIRATION     • KNEE SURGERY     • SPLENECTOMY     • TONSILLECTOMY          PT ASSESSMENT (last 12 hours)      Physical Therapy Evaluation     Row Name 18 1430          PT Evaluation Time/Intention    Subjective Information complains of;weakness;fatigue;dizziness  -KR     Document Type evaluation  -KR     Mode of Treatment physical therapy  -KR     Patient Effort fair  -KR     Row Name 18 1430          General Information    Patient Profile Reviewed? yes  -KR     Onset of Illness/Injury or Date of Surgery 18  -KR     Referring Physician  JULI Quevedo  -KR     Patient Observations alert;agree to therapy  -KR     Patient/Family Observations sitting in reclined in chair, IV; daughter present  -KR     Pertinent History of Current Functional Problem AMS, NH pt, recently discharged back to NH from [facility] with gradual decline; Dx: UTI, hematuria, pneumonia, AMS, C diff, acute renal failure, leukocytosis, metabolic encephalopathy  -KR     Existing Precautions/Restrictions fall  -KR     Limitations/Impairments safety/cognitive  -KR     Risks Reviewed patient and family:;LOB;dizziness;increased discomfort;change in vital signs;lines disloged  -KR     Benefits Reviewed patient and family:;improve function;increase independence;increase balance;increase strength;decrease pain;decrease risk of DVT;improve skin integrity;increase knowledge  -KR     Barriers to Rehab cognitive status;medically complex  -KR     Row Name 06/09/18 1430          Cognitive Assessment/Interventions    Additional Documentation Cognitive Assessment/Intervention (Group)  -KR     Row Name 06/09/18 1430          Cognitive Assessment/Intervention- PT/OT    Orientation Status (Cognition) oriented to;person  -KR     Follows Commands (Cognition) follows one step commands;0-24% accuracy;delayed response/completion;increased processing time needed  -KR     Personal Safety Interventions fall prevention program maintained;gait belt;muscle strengthening facilitated;nonskid shoes/slippers when out of bed;supervised activity  -KR     Row Name 06/09/18 1430          Safety Issues, Functional Mobility    Impairments Affecting Function (Mobility) balance;strength  -KR     Row Name 06/09/18 1430          Bed Mobility Assessment/Treatment    Comment (Bed Mobility) pt up in chair  -KR     Row Name 06/09/18 1430          Transfer Assessment/Treatment    Transfer Assessment/Treatment sit-stand transfer;stand-sit transfer  -KR     Sit-Stand [place] (Transfers) minimum assist (75% patient effort);2  person assist  -KR     Stand-Sit [place] (Transfers) minimum assist (75% patient effort);2 person assist  -KR     Row Name 06/09/18 1430          Sit-Stand Transfer    Assistive Device (Sit-Stand Transfers) walker, front-wheeled  -KR     Row Name 06/09/18 1430          Stand-Sit Transfer    Assistive Device (Stand-Sit Transfers) walker, front-wheeled  -KR     Row Name 06/09/18 1430          Gait/Stairs Assessment/Training    [place] Level (Gait) contact guard;minimum assist (75% patient effort);2 person assist  -KR     Assistive Device (Gait) walker, front-wheeled  -KR     Distance in Feet (Gait) 3  -KR     Comment (Gait/Stairs) Pt need a lot of encouragement   -KR     Row Name 06/09/18 1430          General ROM    GENERAL ROM COMMENTS WFL B LE  -KR     Row Name 06/09/18 1430          General Assessment (Manual Muscle Testing)    Comment, General Manual Muscle Testing (MMT) Assessment functionally 4/5 BLEs  -KR     Row Name 06/09/18 1430          Balance    Balance dynamic standing balance  -KR     Row Name 06/09/18 1430          Static Sitting Balance    Level of [place] (Unsupported Sitting, Static Balance) supervision  -KR     Sitting Position (Unsupported Sitting, Static Balance) sitting in chair  -KR     Row Name 06/09/18 1430          Dynamic Sitting Balance    Level of [place], Reaches Outside Midline (Sitting, Dynamic Balance) standby assist  -KR     Sitting Position, Reaches Outside Midline (Sitting, Dynamic Balance) sitting in chair  -KR     Row Name 06/09/18 1430          Dynamic Standing Balance    Level of [place], Reaches Outside Midline (Standing, Dynamic Balance) minimal assist, 75% patient effort;moderate assist, 50 to 74% patient effort  -KR     Row Name 06/09/18 1430          Sensory Assessment/Intervention    Sensory General Assessment no sensation deficits identified  -KR     Row Name 06/09/18 1430          Pain Scale: Numbers Pre/Post-Treatment    Pain Scale: Numbers,  Pretreatment 0/10 - no pain  -KR     Pain Scale: Numbers, Post-Treatment 0/10 - no pain  -KR     Row Name 06/09/18 1430          Plan of Care Review    Plan of Care Reviewed With patient;daughter  -KR     Row Name 06/09/18 1430          Physical Therapy Clinical Impression    Date of Referral to PT 06/05/18  -KR     Patient/Family Goals Statement (PT Clinical Impression) improve independence with mobiltiy  -KR     Criteria for Skilled Interventions Met (PT Clinical Impression) yes;treatment indicated  -KR     Rehab Potential (PT Clinical Summary) good, to achieve stated therapy goals  -KR     Predicted Duration of Therapy (PT) until discharge  -KR     Patient/Family Concerns, Anticipated Discharge Disposition (PT) currently debating hospice vs. rehab  -KR     Row Name 06/09/18 1430          Physical Therapy Goals    Bed Mobility Goal Selection (PT) bed mobility, PT goal 1  -KR     Transfer Goal Selection (PT) transfer, PT goal 1  -KR     Gait Training Goal Selection (PT) gait training, PT goal 1  -KR     Row Name 06/09/18 1430          Bed Mobility Goal 1 (PT)    Activity/Assistive Device (Bed Mobility Goal 1, PT) bed mobility activities, all  -KR     [place] Level/Cues Needed (Bed Mobility Goal 1, PT) supervision required  -KR     Time Frame (Bed Mobility Goal 1, PT) by discharge  -KR     Row Name 06/09/18 1430          Transfer Goal 1 (PT)    Activity/Assistive Device (Transfer Goal 1, PT) transfers, all  -KR     [place] Level/Cues Needed (Transfer Goal 1, PT) supervision required  -KR     Time Frame (Transfer Goal 1, PT) by discharge  -KR     Row Name 06/09/18 1430          Gait Training Goal 1 (PT)    Activity/Assistive Device (Gait Training Goal 1, PT) gait (walking locomotion);assistive device use  -KR     [place] Level (Gait Training Goal 1, PT) supervision required  -KR     Distance (Gait Goal 1, PT) 150  -KR     Time Frame (Gait Training Goal 1, PT) by discharge  -KR     Row Name 06/09/18  1430          Patient Education Goal (PT)    Activity (Patient Education Goal, PT) HEP, safety, gait, benefit of activity  -KR     [place]/Cues/Accuracy (Memory Goal 2, PT) demonstrates adequately;verbalizes understanding  -KR     Time Frame (Patient Education Goal, PT) by discharge  -KR     Row Name 06/09/18 1430          Positioning and Restraints    Pre-Treatment Position sitting in chair/recliner  -KR     Post Treatment Position chair  -KR     In Chair reclined;call light within reach;encouraged to call for assist;with family/caregiver  -KR       User Key  (r) = Recorded By, (t) = Taken By, (c) = Cosigned By    Initials Name Provider Type    ARLINE Winter, PT DPT Physical Therapist          Physical Therapy Education     Title: PT OT SLP Therapies (Active)     Topic: Physical Therapy (Active)     Point: Mobility training (Done)    Learning Progress Summary     Learner Status Readiness Method Response Comment Documented by    Patient Done Ursulaer KRISTY VU,NR benefit of activity KR 06/09/18 1459    Family Done Eager E VU,NR benefit of activity KR 06/09/18 1459          Point: Body mechanics (Done)    Learning Progress Summary     Learner Status Readiness Method Response Comment Documented by    Patient Done Eli WAGNER VU,NR benefit of activity KR 06/09/18 1459    Family Done Ursulaer KRISTY VU,NR benefit of activity KR 06/09/18 1459                      User Key     Initials Effective Dates Name Provider Type [facility] 06/19/15 -  Tika Winter, PT DPT Physical Therapist PT                PT Recommendation and Plan  Anticipated Discharge Disposition (PT): skilled nursing facility  Planned Therapy Interventions (PT Eval): balance training, bed mobility training, gait training, home exercise program, neuromuscular re-education, patient/family education, postural re-education, ROM (range of motion), strengthening, transfer training  Therapy Frequency (PT Clinical Impression): daily (1-2 times per  day)  Outcome Summary/Treatment Plan (PT)  Anticipated Discharge Disposition (PT): skilled nursing facility  Patient/Family Concerns, Anticipated Discharge Disposition (PT): currently debating hospice vs. rehab  Plan of Care Reviewed With: patient, daughter  Outcome Summary: Initial physical therapy evaluation performed. Pt needed min A x2 for transfers. Pt ambulated 2-3 feet with RW and min A X 2. She needed encouragement for activity and was also delayed with responses to questions. We discussed rehab with daughter present , she stated they are having a meeting tomorrow to make a decision. I do believe she could benefit from additional skilled therapy to improve her mobility and tolerance to activity. Skilled PT will continue to work with patient while inpatient.           Outcome Measures     Row Name 06/09/18 1400 06/08/18 1400          How much help from another person do you currently need...    Turning from your back to your side while in flat bed without using bedrails? 2  -KR  --     Moving from lying on back to sitting on the side of a flat bed without bedrails? 2  -KR  --     Moving to and from a bed to a chair (including a wheelchair)? 2  -KR  --     Standing up from a chair using your arms (e.g., wheelchair, bedside chair)? 2  -KR  --     Climbing 3-5 steps with a railing? 1  -KR  --     To walk in hospital room? 2  -KR  --     AM-PAC 6 Clicks Score 11  -KR  --        How much help from another is currently needed...    Putting on and taking off regular lower body clothing?  -- 2  -TS     Bathing (including washing, rinsing, and drying)  -- 2  -TS     Toileting (which includes using toilet bed pan or urinal)  -- 2  -TS     Putting on and taking off regular upper body clothing  -- 3  -TS     Taking care of personal grooming (such as brushing teeth)  -- 3  -TS     Eating meals  -- 3  -TS     Score  -- 15  -TS        Functional Assessment    Outcome Measure Options AM-PAC 6 Clicks Basic Mobility (PT)  -KR  AM-PAC 6 Clicks Daily Activity (OT)  -TS       User Key  (r) = Recorded By, (t) = Taken By, (c) = Cosigned By    Initials Name Provider Type    TS DARCI Ta/L Occupational Therapy Assistant    ARLINE Winter, PT DPT Physical Therapist           Time Calculation:         PT Charges     Row Name 06/09/18 1430             Time Calculation    Start Time 1430  -KR      Stop Time 1455  -KR      Time Calculation (min) 25 min  -KR      PT Received On 06/09/18  -KR      PT Goal Re-Cert Due Date 06/19/18  -KR         Time Calculation- PT    Total Timed Code Minutes- PT 0 minute(s)  -KR        User Key  (r) = Recorded By, (t) = Taken By, (c) = Cosigned By    Initials Name Provider Type    KR Tika Winter, PT DPT Physical Therapist          Therapy Charges for Today     Code Description Service Date Service Provider Modifiers Qty    31044154037 HC PT MOBILITY CURRENT 6/9/2018 Tika Winter, PT DPT GP, CM 1    77612752720 HC PT MOBILITY PROJECTED 6/9/2018 Tika Winter, PT DPT GP, CJ 1    57868339759 HC PT EVAL MOD COMPLEXITY 2 6/9/2018 Tika Winter, PT DPT GP, KX 1          PT G-Codes  PT Professional Judgement Used?: Yes  Outcome Measure Options: AM-PAC 6 Clicks Basic Mobility (PT)  Functional Limitation: Mobility: Walking and moving around  Mobility: Walking and Moving Around Current Status (): At least 80 percent but less than 100 percent impaired, limited or restricted  Mobility: Walking and Moving Around Goal Status (): At least 20 percent but less than 40 percent impaired, limited or restricted      Tika Winter, PT DPT  6/9/2018

## 2018-06-09 NOTE — PROGRESS NOTES
"    [facility] Medicine Services  INPATIENT PROGRESS NOTE    Length of Stay: 5  Date of Admission: 6/4/2018  Primary Care Physician: Magan Lopez MD    Subjective   Chief Complaint: follow up UTI  HPI   Pt is much more awake and alert. States she feels \"kartik.\" Brother and his wife at bedside. Daughter came in at time of assessment. Pt still having loose stools. Nothing bloody. She is more talkative today. Denies any shortness of breath. States it's a good thing that her brother is here.     Review of Systems   All pertinent negatives and positives are as above. All other systems have been reviewed and are negative unless otherwise stated.     Objective    Temp:  [96.3 °F (35.7 °C)-98.2 °F (36.8 °C)] 96.9 °F (36.1 °C)  Heart Rate:  [60-71] 71  Resp:  [16-18] 18  BP: (119-158)/(51-73) 151/51  Physical Exam   Constitutional: She is oriented to person, place, and time. She appears well-developed and well-nourished.   HENT:   Head: Normocephalic and atraumatic.   Eyes: Conjunctivae and EOM are normal. Pupils are equal, round, and reactive to light.   Neck: Neck supple. No JVD present. No thyromegaly present.   Cardiovascular: Normal rate, regular rhythm, normal heart sounds and intact distal pulses.  Exam reveals no gallop and no friction rub.    No murmur heard.  Pulmonary/Chest: Effort normal and breath sounds normal. No respiratory distress. She has no wheezes. She has no rales. She exhibits no tenderness.   Abdominal: Soft. Bowel sounds are normal. She exhibits no distension. There is no tenderness. There is no rebound and no guarding.   Musculoskeletal: Normal range of motion. She exhibits edema (trace edema bilaterally. ). She exhibits no tenderness or deformity.   Lymphadenopathy:     She has no cervical adenopathy.   Neurological: She is alert and oriented to person, place, and time. She displays normal reflexes. No cranial nerve deficit. She exhibits normal muscle tone.   Much " less asterixis noted.    Skin: Skin is warm and dry. No rash noted.   Psychiatric: She has a normal mood and affect. Her behavior is normal. Judgment and thought content normal.     Results Review:  I have reviewed the labs, radiology results, and diagnostic studies.    Laboratory Data:     Results from last 7 days  Lab Units 06/08/18  0551 06/07/18  0548 06/06/18  0634   WBC 10*3/mm3 29.55* 26.35* 28.70*   HEMOGLOBIN g/dL 13.0 12.2 12.0   HEMATOCRIT % 41.1 38.1 37.9   PLATELETS 10*3/mm3 554* 561* 538*       Results from last 7 days  Lab Units 06/09/18  1316 06/08/18  0551 06/07/18  0548   SODIUM mmol/L 144 145 143   POTASSIUM mmol/L 3.6 4.5 4.3   CHLORIDE mmol/L 114* 114* 113*   CO2 mmol/L 18.0* 19.0* 19.0*   BUN mg/dL 12 15 15   CREATININE mg/dL 0.97 1.04 1.05   CALCIUM mg/dL 11.0* 11.0* 10.8*   BILIRUBIN mg/dL 0.5 0.7 0.8   ALK PHOS U/L 174* 169* 177*   ALT (SGPT) U/L 23 26 24   AST (SGOT) U/L 128* 130* 126*   GLUCOSE mg/dL 131* 120* 149*       Culture Data:   Blood Culture   Date Value Ref Range Status   06/06/2018 No growth at 2 days  Preliminary   06/04/2018 No growth at 4 days  Preliminary   06/04/2018 Abnormal Stain (A)  Preliminary   06/04/2018 Staphylococcus, coagulase negative (A)  Preliminary     Urine Culture   Date Value Ref Range Status   06/04/2018 (A)  Final    >100,000 CFU/mL Klebsiella pneumoniae ssp pneumoniae     Radiology Data:   Imaging Results (last 24 hours)     ** No results found for the last 24 hours. **        I have reviewed the patient current medications.     Assessment/Plan   Assessment:  1. Left upper lobe patchy infiltrate concerning for pneumonia- healthcare associated. Repeat chest x-ray reveals resolution of the infiltrate  2. Urinary tract infection, urine culture with growth of Klebsiella pneumoniae  3. Acute metabolic encephalopathy- likely secondary to above  4. Acute kidney injury on chronic kidney disease- difficult to ascertain baseline kidney function.  Improved  5.  Leukocytosis-chronic  6. Recent diagnosis of cholangicarcinoma  7. Clostridium Difficile colitis with recurrence  8. Dementia  9. Insulin dependent Diabetes Mellitus Type 2, hgb A1c 6.8%  10. Asplenic  11. Thrombocytosis  12. Elevated Ammonia level.  13. Hypercalcemia    Plan:  1. Continue Xifaxin  2. Will give Zometa today  3. Restart Losartan in am.   4. HCTZ on hold  5. Aztreonam day 5  6. Repeat cbc, cmp, Ammonia, Ionized calcium tomorrow  7. I assisted pt to the chair with help of nursing. She has PT/OT/ST consults. They will  see tomorrow.   8. Family meeting tomorrow after lunch to discuss plans.     Discharge Planning: I expect the patient to be discharged to ? in ? days.    Amanda Lewis, JULI   06/09/18   2:11 PM

## 2018-06-09 NOTE — PLAN OF CARE
Problem: Patient Care Overview  Goal: Plan of Care Review  Outcome: Ongoing (interventions implemented as appropriate)   06/09/18 1500   Coping/Psychosocial   Plan of Care Reviewed With patient;daughter   OTHER   Outcome Summary Initial physical therapy evaluation performed. Pt needed min A x2 for transfers. Pt ambulated 2-3 feet with RW and min A X 2. She needed encouragement for activity and was also delayed with responses to questions. We discussed rehab with daughter present , she stated they are having a meeting tomorrow to make a decision. I do believe she could benefit from additional skilled therapy to improve her mobility and tolerance to activity. Skilled PT will continue to work with patient while inpatient.

## 2018-06-10 LAB
ALBUMIN SERPL-MCNC: 3.3 G/DL (ref 3.5–5)
ALBUMIN/GLOB SERPL: 0.9 G/DL (ref 1.1–2.5)
ALP SERPL-CCNC: 185 U/L (ref 24–120)
ALT SERPL W P-5'-P-CCNC: 29 U/L (ref 0–54)
AMMONIA BLD-SCNC: 25 UMOL/L (ref 9–33)
ANION GAP SERPL CALCULATED.3IONS-SCNC: 9 MMOL/L (ref 4–13)
ANISOCYTOSIS BLD QL: ABNORMAL
AST SERPL-CCNC: 132 U/L (ref 7–45)
BACTERIA SPEC AEROBE CULT: ABNORMAL
BILIRUB SERPL-MCNC: 0.6 MG/DL (ref 0.1–1)
BUN BLD-MCNC: 10 MG/DL (ref 5–21)
BUN/CREAT SERPL: 12.2 (ref 7–25)
BURR CELLS BLD QL SMEAR: ABNORMAL
CALCIUM SPEC-SCNC: 11.1 MG/DL (ref 8.4–10.4)
CHLORIDE SERPL-SCNC: 112 MMOL/L (ref 98–110)
CO2 SERPL-SCNC: 20 MMOL/L (ref 24–31)
CREAT BLD-MCNC: 0.82 MG/DL (ref 0.5–1.4)
DEPRECATED RDW RBC AUTO: 54.5 FL (ref 40–54)
EOSINOPHIL # BLD MANUAL: 1.26 10*3/MM3 (ref 0–0.7)
EOSINOPHIL NFR BLD MANUAL: 4 % (ref 0–4)
ERYTHROCYTE [DISTWIDTH] IN BLOOD BY AUTOMATED COUNT: 17.3 % (ref 12–15)
GFR SERPL CREATININE-BSD FRML MDRD: 68 ML/MIN/1.73
GIANT PLATELETS: ABNORMAL
GLOBULIN UR ELPH-MCNC: 3.8 GM/DL
GLUCOSE BLD-MCNC: 106 MG/DL (ref 70–100)
GLUCOSE BLDC GLUCOMTR-MCNC: 125 MG/DL (ref 70–130)
GLUCOSE BLDC GLUCOMTR-MCNC: 137 MG/DL (ref 70–130)
GLUCOSE BLDC GLUCOMTR-MCNC: 137 MG/DL (ref 70–130)
GRAM STN SPEC: ABNORMAL
HCT VFR BLD AUTO: 40.5 % (ref 37–47)
HGB BLD-MCNC: 12.8 G/DL (ref 12–16)
ISOLATED FROM: ABNORMAL
LYMPHOCYTES # BLD MANUAL: 7.27 10*3/MM3 (ref 0.72–4.86)
LYMPHOCYTES NFR BLD MANUAL: 23 % (ref 15–45)
LYMPHOCYTES NFR BLD MANUAL: 8 % (ref 4–12)
MCH RBC QN AUTO: 27.9 PG (ref 28–32)
MCHC RBC AUTO-ENTMCNC: 31.6 G/DL (ref 33–36)
MCV RBC AUTO: 88.4 FL (ref 82–98)
MONOCYTES # BLD AUTO: 2.53 10*3/MM3 (ref 0.19–1.3)
NEUTROPHILS # BLD AUTO: 13.6 10*3/MM3 (ref 1.87–8.4)
NEUTROPHILS NFR BLD MANUAL: 43 % (ref 39–78)
NRBC SPEC MANUAL: 1 /100 WBC (ref 0–0)
PLATELET # BLD AUTO: 559 10*3/MM3 (ref 130–400)
PMV BLD AUTO: 11.4 FL (ref 6–12)
POIKILOCYTOSIS BLD QL SMEAR: ABNORMAL
POTASSIUM BLD-SCNC: 3.9 MMOL/L (ref 3.5–5.3)
PROT SERPL-MCNC: 7.1 G/DL (ref 6.3–8.7)
RBC # BLD AUTO: 4.58 10*6/MM3 (ref 4.2–5.4)
SMALL PLATELETS BLD QL SMEAR: ABNORMAL
SODIUM BLD-SCNC: 141 MMOL/L (ref 135–145)
TARGETS BLD QL SMEAR: ABNORMAL
VARIANT LYMPHS NFR BLD MANUAL: 22 % (ref 0–5)
WBC MORPH BLD: NORMAL
WBC NRBC COR # BLD: 31.62 10*3/MM3 (ref 4.8–10.8)

## 2018-06-10 PROCEDURE — 85007 BL SMEAR W/DIFF WBC COUNT: CPT | Performed by: NURSE PRACTITIONER

## 2018-06-10 PROCEDURE — 82962 GLUCOSE BLOOD TEST: CPT

## 2018-06-10 PROCEDURE — 85025 COMPLETE CBC W/AUTO DIFF WBC: CPT | Performed by: NURSE PRACTITIONER

## 2018-06-10 PROCEDURE — 82140 ASSAY OF AMMONIA: CPT | Performed by: NURSE PRACTITIONER

## 2018-06-10 PROCEDURE — 63710000001 INSULIN DETEMIR PER 5 UNITS: Performed by: NURSE PRACTITIONER

## 2018-06-10 PROCEDURE — 80053 COMPREHEN METABOLIC PANEL: CPT | Performed by: NURSE PRACTITIONER

## 2018-06-10 PROCEDURE — 25010000002 HEPARIN (PORCINE) PER 1000 UNITS: Performed by: FAMILY MEDICINE

## 2018-06-10 RX ORDER — MEGESTROL ACETATE 40 MG/1
40 TABLET ORAL 4 TIMES DAILY
Status: DISCONTINUED | OUTPATIENT
Start: 2018-06-10 | End: 2018-06-12 | Stop reason: HOSPADM

## 2018-06-10 RX ADMIN — MEMANTINE HYDROCHLORIDE 10 MG: 5 TABLET, FILM COATED ORAL at 22:15

## 2018-06-10 RX ADMIN — MEGESTROL ACETATE 800 MG: 40 SUSPENSION ORAL at 08:25

## 2018-06-10 RX ADMIN — Medication 500 MG: at 22:15

## 2018-06-10 RX ADMIN — AZTREONAM 1000 MG: 1 INJECTION, POWDER, LYOPHILIZED, FOR SOLUTION INTRAMUSCULAR; INTRAVENOUS at 12:49

## 2018-06-10 RX ADMIN — FAMOTIDINE 20 MG: 20 TABLET, FILM COATED ORAL at 08:25

## 2018-06-10 RX ADMIN — LACTULOSE 20 G: 20 SOLUTION ORAL at 08:25

## 2018-06-10 RX ADMIN — MEGESTROL ACETATE 40 MG: 40 TABLET ORAL at 17:03

## 2018-06-10 RX ADMIN — ESCITALOPRAM 20 MG: 10 TABLET, FILM COATED ORAL at 08:25

## 2018-06-10 RX ADMIN — AMLODIPINE BESYLATE 5 MG: 5 TABLET ORAL at 08:25

## 2018-06-10 RX ADMIN — RIFAXIMIN 550 MG: 550 TABLET ORAL at 23:14

## 2018-06-10 RX ADMIN — Medication 500 MG: at 08:25

## 2018-06-10 RX ADMIN — LOSARTAN POTASSIUM 100 MG: 50 TABLET ORAL at 08:25

## 2018-06-10 RX ADMIN — AZTREONAM 1000 MG: 1 INJECTION, POWDER, LYOPHILIZED, FOR SOLUTION INTRAMUSCULAR; INTRAVENOUS at 04:16

## 2018-06-10 RX ADMIN — Medication 1 SPRAY: at 22:17

## 2018-06-10 RX ADMIN — Medication 1 SPRAY: at 17:03

## 2018-06-10 RX ADMIN — INSULIN DETEMIR 10 UNITS: 100 INJECTION, SOLUTION SUBCUTANEOUS at 08:22

## 2018-06-10 RX ADMIN — VANCOMYCIN HYDROCHLORIDE 125 MG: KIT at 15:59

## 2018-06-10 RX ADMIN — DONEPEZIL HYDROCHLORIDE 10 MG: 10 TABLET, FILM COATED ORAL at 22:14

## 2018-06-10 RX ADMIN — INSULIN DETEMIR 10 UNITS: 100 INJECTION, SOLUTION SUBCUTANEOUS at 23:14

## 2018-06-10 RX ADMIN — HEPARIN SODIUM 5000 UNITS: 5000 INJECTION INTRAVENOUS; SUBCUTANEOUS at 17:03

## 2018-06-10 RX ADMIN — ATORVASTATIN CALCIUM 20 MG: 10 TABLET, FILM COATED ORAL at 08:25

## 2018-06-10 RX ADMIN — MEMANTINE HYDROCHLORIDE 10 MG: 5 TABLET, FILM COATED ORAL at 08:25

## 2018-06-10 RX ADMIN — RIFAXIMIN 550 MG: 550 TABLET ORAL at 08:25

## 2018-06-10 RX ADMIN — VANCOMYCIN HYDROCHLORIDE 125 MG: KIT at 10:40

## 2018-06-10 RX ADMIN — VANCOMYCIN HYDROCHLORIDE 125 MG: KIT at 04:16

## 2018-06-10 RX ADMIN — MEGESTROL ACETATE 40 MG: 40 TABLET ORAL at 22:16

## 2018-06-10 RX ADMIN — TRAMADOL HYDROCHLORIDE 25 MG: 50 TABLET, COATED ORAL at 18:24

## 2018-06-10 RX ADMIN — Medication 1 SPRAY: at 08:22

## 2018-06-10 RX ADMIN — AZTREONAM 1000 MG: 1 INJECTION, POWDER, LYOPHILIZED, FOR SOLUTION INTRAMUSCULAR; INTRAVENOUS at 22:16

## 2018-06-10 RX ADMIN — VANCOMYCIN HYDROCHLORIDE 125 MG: KIT at 22:15

## 2018-06-10 RX ADMIN — HEPARIN SODIUM 5000 UNITS: 5000 INJECTION INTRAVENOUS; SUBCUTANEOUS at 06:33

## 2018-06-10 NOTE — PLAN OF CARE
Problem: Patient Care Overview  Goal: Plan of Care Review  Outcome: Ongoing (interventions implemented as appropriate)  Pt has had a couple of small bowel movements today. No c/o pain today. Turned q 2 hours. IV abx continue. Afebrile. Family at bedside.     06/10/18 1503   Coping/Psychosocial   Plan of Care Reviewed With patient   Plan of Care Review   Progress improving     Goal: Discharge Needs Assessment  Outcome: Ongoing (interventions implemented as appropriate)   06/05/18 1405 06/05/18 1410   Discharge Needs Assessment   Readmission Within the Last 30 Days --  current reason for admission unrelated to previous admission   Concerns to be Addressed --  discharge planning   Concerns Comments WILL NEED SKILLED NSG FACILITY --    Patient/Family Anticipates Transition to long term care facility --    Patient/Family Anticipated Services at Transition --  ;skilled nursing   Transportation Concerns car, none --    Transportation Anticipated --  health plan transportation   Anticipated Changes Related to Illness --  inability to care for self   Equipment Needed After Discharge --  none   Outpatient/Agency/Support Group Needs --  skilled nursing facility   Discharge Facility/Level of Care Needs --  nursing facility, skilled   Disability   Equipment Currently Used at Home --  commode;shower chair;wheelchair       Problem: Fall Risk (Adult)  Goal: Absence of Fall  Outcome: Ongoing (interventions implemented as appropriate)   06/10/18 1503   Fall Risk (Adult)   Absence of Fall achieves outcome       Problem: Skin Injury Risk (Adult)  Goal: Skin Health and Integrity  Outcome: Ongoing (interventions implemented as appropriate)   06/10/18 1503   Skin Injury Risk (Adult)   Skin Health and Integrity making progress toward outcome       Problem: Infection, Risk/Actual (Adult)  Goal: Infection Prevention/Resolution  Outcome: Ongoing (interventions implemented as appropriate)   06/10/18 1503   Infection, Risk/Actual  (Adult)   Infection Prevention/Resolution making progress toward outcome

## 2018-06-10 NOTE — PROGRESS NOTES
[facility] Medicine Services  INPATIENT PROGRESS NOTE    Length of Stay: 6  Date of Admission: 6/4/2018  Primary Care Physician: Magan Lopez MD    Subjective   Chief Complaint: follow up confusion  HPI   She only ambulated 2-3 feet with physical therapy yesterday. States she does not feel as well today. Still with loose bowels. Less alert today but is without asterixis. States she hurts just all over.     Review of Systems  All pertinent negatives and positives are as above. All other systems have been reviewed and are negative unless otherwise stated.     Objective    Temp:  [97.6 °F (36.4 °C)-98.6 °F (37 °C)] 98.6 °F (37 °C)  Heart Rate:  [62-84] 79  Resp:  [16-18] 18  BP: (128-166)/(56-86) 166/86  Physical Exam   Constitutional: She is oriented to person, place, and time. She appears well-developed and well-nourished.   HENT:   Head: Normocephalic and atraumatic.   Eyes: Conjunctivae and EOM are normal. Pupils are equal, round, and reactive to light.   Neck: Neck supple. No JVD present. No thyromegaly present.   Cardiovascular: Normal rate, regular rhythm, normal heart sounds and intact distal pulses.  Exam reveals no gallop and no friction rub.    No murmur heard.  Sinus 53-62 on telemetry.    Pulmonary/Chest: Effort normal and breath sounds normal. No respiratory distress. She has no wheezes. She has no rales. She exhibits no tenderness.   Abdominal: Soft. Bowel sounds are normal. She exhibits distension. There is no tenderness. There is no rebound and no guarding.   Musculoskeletal: Normal range of motion. She exhibits no edema, tenderness or deformity.   Lymphadenopathy:     She has no cervical adenopathy.   Neurological: She is alert and oriented to person, place, and time. She displays normal reflexes. No cranial nerve deficit. She exhibits normal muscle tone.   Skin: Skin is warm and dry. No rash noted.   Psychiatric: She has a normal mood and affect. Her behavior is  normal. Judgment and thought content normal.     Results Review:  I have reviewed the labs, radiology results, and diagnostic studies.    Laboratory Data:     Results from last 7 days  Lab Units 06/10/18  0615 06/08/18  0551 06/07/18  0548   WBC 10*3/mm3 31.62* 29.55* 26.35*   HEMOGLOBIN g/dL 12.8 13.0 12.2   HEMATOCRIT % 40.5 41.1 38.1   PLATELETS 10*3/mm3 559* 554* 561*          Results from last 7 days  Lab Units 06/10/18  0615 06/09/18  1316 06/08/18  0551   SODIUM mmol/L 141 144 145   POTASSIUM mmol/L 3.9 3.6 4.5   CHLORIDE mmol/L 112* 114* 114*   CO2 mmol/L 20.0* 18.0* 19.0*   BUN mg/dL 10 12 15   CREATININE mg/dL 0.82 0.97 1.04   CALCIUM mg/dL 11.1* 11.0* 11.0*   BILIRUBIN mg/dL 0.6 0.5 0.7   ALK PHOS U/L 185* 174* 169*   ALT (SGPT) U/L 29 23 26   AST (SGOT) U/L 132* 128* 130*   GLUCOSE mg/dL 106* 131* 120*       Culture Data:   Blood Culture   Date Value Ref Range Status   06/06/2018 No growth at 3 days  Preliminary   06/04/2018 No growth at 5 days  Final   06/04/2018 Staphylococcus, coagulase negative (A)  Final     Urine Culture   Date Value Ref Range Status   06/04/2018 (A)  Final    >100,000 CFU/mL Klebsiella pneumoniae ssp pneumoniae       Radiology Data:   Imaging Results (last 24 hours)     ** No results found for the last 24 hours. **          I have reviewed the patient current medications.     Assessment/Plan   Assessment:  1. Left upper lobe patchy infiltrate concerning for pneumonia- healthcare associated. Repeat chest x-ray reveals resolution of the infiltrate  2. Urinary tract infection, urine culture with growth of Klebsiella pneumoniae  3. Acute metabolic encephalopathy- likely secondary to above  4. Acute kidney injury on chronic kidney disease- difficult to ascertain baseline kidney function.  Improved  5. Leukocytosis-chronic  6. Recent diagnosis of cholangicarcinoma  7. Clostridium Difficile colitis with recurrence  8. Dementia  9. Insulin dependent Diabetes Mellitus Type 2, hgb A1c  6.8%  10. Asplenic  11. Thrombocytosis  12. Elevated Ammonia level-improved  13. Hypercalcemia     Plan:  1. Status post Zometa 6/9  2. Aztreonam day 6/7  3. Will consult dr. Ramirez to see again in the am.   4. Family is wanting an estimated time frame of life.   5. Once they discuss with Dr. Ramirez, they will decide on disposition.   6. Pt is not drinking megace, discussing with pharmacy if tablets are available here.   7. Continue Lactulose.     Discharge Planning: I expect the patient to be discharged to ? In ? days.    Amanda Lewis, APRN   06/10/18   1:01 PM

## 2018-06-10 NOTE — PLAN OF CARE
Problem: Patient Care Overview  Goal: Plan of Care Review  Outcome: Ongoing (interventions implemented as appropriate)   06/10/18 0400   Coping/Psychosocial   Plan of Care Reviewed With patient   Plan of Care Review   Progress no change   OTHER   Outcome Summary PT alert and confused. No neuro changes noted. Turns self, no new skin breakdown. Afebrile, cont ABX as ordered.        Problem: Fall Risk (Adult)  Goal: Absence of Fall  Outcome: Ongoing (interventions implemented as appropriate)      Problem: Skin Injury Risk (Adult)  Goal: Skin Health and Integrity  Outcome: Ongoing (interventions implemented as appropriate)      Problem: Infection, Risk/Actual (Adult)  Goal: Infection Prevention/Resolution  Outcome: Ongoing (interventions implemented as appropriate)

## 2018-06-11 LAB
ALBUMIN SERPL-MCNC: 3.2 G/DL (ref 3.5–5)
ALBUMIN/GLOB SERPL: 0.9 G/DL (ref 1.1–2.5)
ALP SERPL-CCNC: 184 U/L (ref 24–120)
ALT SERPL W P-5'-P-CCNC: 22 U/L (ref 0–54)
ANION GAP SERPL CALCULATED.3IONS-SCNC: 11 MMOL/L (ref 4–13)
AST SERPL-CCNC: 140 U/L (ref 7–45)
BACTERIA SPEC AEROBE CULT: NORMAL
BILIRUB SERPL-MCNC: 0.5 MG/DL (ref 0.1–1)
BUN BLD-MCNC: 11 MG/DL (ref 5–21)
BUN/CREAT SERPL: 12.1 (ref 7–25)
CA-I BLD-MCNC: 6.11 MG/DL (ref 4.6–5.4)
CALCIUM SPEC-SCNC: 11.5 MG/DL (ref 8.4–10.4)
CHLORIDE SERPL-SCNC: 114 MMOL/L (ref 98–110)
CO2 SERPL-SCNC: 19 MMOL/L (ref 24–31)
CREAT BLD-MCNC: 0.91 MG/DL (ref 0.5–1.4)
GFR SERPL CREATININE-BSD FRML MDRD: 60 ML/MIN/1.73
GLOBULIN UR ELPH-MCNC: 3.7 GM/DL
GLUCOSE BLD-MCNC: 149 MG/DL (ref 70–100)
GLUCOSE BLDC GLUCOMTR-MCNC: 114 MG/DL (ref 70–130)
GLUCOSE BLDC GLUCOMTR-MCNC: 132 MG/DL (ref 70–130)
GLUCOSE BLDC GLUCOMTR-MCNC: 99 MG/DL (ref 70–130)
Lab: ABNORMAL
POTASSIUM BLD-SCNC: 3.5 MMOL/L (ref 3.5–5.3)
PROT SERPL-MCNC: 6.9 G/DL (ref 6.3–8.7)
SODIUM BLD-SCNC: 144 MMOL/L (ref 135–145)

## 2018-06-11 PROCEDURE — 92507 TX SP LANG VOICE COMM INDIV: CPT

## 2018-06-11 PROCEDURE — 92526 ORAL FUNCTION THERAPY: CPT

## 2018-06-11 PROCEDURE — 97530 THERAPEUTIC ACTIVITIES: CPT

## 2018-06-11 PROCEDURE — 82330 ASSAY OF CALCIUM: CPT

## 2018-06-11 PROCEDURE — 25010000002 HEPARIN (PORCINE) PER 1000 UNITS: Performed by: FAMILY MEDICINE

## 2018-06-11 PROCEDURE — 97116 GAIT TRAINING THERAPY: CPT

## 2018-06-11 PROCEDURE — 63710000001 INSULIN DETEMIR PER 5 UNITS: Performed by: NURSE PRACTITIONER

## 2018-06-11 PROCEDURE — 80053 COMPREHEN METABOLIC PANEL: CPT | Performed by: NURSE PRACTITIONER

## 2018-06-11 PROCEDURE — 97535 SELF CARE MNGMENT TRAINING: CPT

## 2018-06-11 PROCEDURE — 82962 GLUCOSE BLOOD TEST: CPT

## 2018-06-11 RX ADMIN — Medication 1 SPRAY: at 20:45

## 2018-06-11 RX ADMIN — MEGESTROL ACETATE 40 MG: 40 TABLET ORAL at 12:05

## 2018-06-11 RX ADMIN — AZTREONAM 1000 MG: 1 INJECTION, POWDER, LYOPHILIZED, FOR SOLUTION INTRAMUSCULAR; INTRAVENOUS at 20:35

## 2018-06-11 RX ADMIN — MEGESTROL ACETATE 40 MG: 40 TABLET ORAL at 20:34

## 2018-06-11 RX ADMIN — AZTREONAM 1000 MG: 1 INJECTION, POWDER, LYOPHILIZED, FOR SOLUTION INTRAMUSCULAR; INTRAVENOUS at 14:27

## 2018-06-11 RX ADMIN — LACTULOSE 20 G: 20 SOLUTION ORAL at 09:52

## 2018-06-11 RX ADMIN — INSULIN DETEMIR 10 UNITS: 100 INJECTION, SOLUTION SUBCUTANEOUS at 20:41

## 2018-06-11 RX ADMIN — TRAMADOL HYDROCHLORIDE 25 MG: 50 TABLET, COATED ORAL at 20:35

## 2018-06-11 RX ADMIN — ESCITALOPRAM 20 MG: 10 TABLET, FILM COATED ORAL at 09:51

## 2018-06-11 RX ADMIN — VANCOMYCIN HYDROCHLORIDE 125 MG: KIT at 14:27

## 2018-06-11 RX ADMIN — RIFAXIMIN 550 MG: 550 TABLET ORAL at 09:52

## 2018-06-11 RX ADMIN — Medication 500 MG: at 09:51

## 2018-06-11 RX ADMIN — MEMANTINE HYDROCHLORIDE 10 MG: 5 TABLET, FILM COATED ORAL at 20:34

## 2018-06-11 RX ADMIN — VANCOMYCIN HYDROCHLORIDE 125 MG: KIT at 20:34

## 2018-06-11 RX ADMIN — HEPARIN SODIUM 5000 UNITS: 5000 INJECTION INTRAVENOUS; SUBCUTANEOUS at 06:21

## 2018-06-11 RX ADMIN — FUROSEMIDE 20 MG: 20 TABLET ORAL at 09:51

## 2018-06-11 RX ADMIN — ATORVASTATIN CALCIUM 20 MG: 10 TABLET, FILM COATED ORAL at 09:51

## 2018-06-11 RX ADMIN — VANCOMYCIN HYDROCHLORIDE 125 MG: KIT at 09:54

## 2018-06-11 RX ADMIN — Medication 500 MG: at 20:34

## 2018-06-11 RX ADMIN — Medication 1 SPRAY: at 14:28

## 2018-06-11 RX ADMIN — TRAMADOL HYDROCHLORIDE 25 MG: 50 TABLET, COATED ORAL at 04:04

## 2018-06-11 RX ADMIN — Medication 1 SPRAY: at 06:22

## 2018-06-11 RX ADMIN — LOSARTAN POTASSIUM 100 MG: 50 TABLET ORAL at 09:51

## 2018-06-11 RX ADMIN — HEPARIN SODIUM 5000 UNITS: 5000 INJECTION INTRAVENOUS; SUBCUTANEOUS at 17:43

## 2018-06-11 RX ADMIN — AZTREONAM 1000 MG: 1 INJECTION, POWDER, LYOPHILIZED, FOR SOLUTION INTRAMUSCULAR; INTRAVENOUS at 06:21

## 2018-06-11 RX ADMIN — MEGESTROL ACETATE 40 MG: 40 TABLET ORAL at 07:47

## 2018-06-11 RX ADMIN — TRAMADOL HYDROCHLORIDE 25 MG: 50 TABLET, COATED ORAL at 12:06

## 2018-06-11 RX ADMIN — FAMOTIDINE 20 MG: 20 TABLET, FILM COATED ORAL at 09:52

## 2018-06-11 RX ADMIN — AMLODIPINE BESYLATE 5 MG: 5 TABLET ORAL at 09:51

## 2018-06-11 RX ADMIN — RIFAXIMIN 550 MG: 550 TABLET ORAL at 20:34

## 2018-06-11 RX ADMIN — MEMANTINE HYDROCHLORIDE 10 MG: 5 TABLET, FILM COATED ORAL at 09:51

## 2018-06-11 RX ADMIN — DONEPEZIL HYDROCHLORIDE 10 MG: 10 TABLET, FILM COATED ORAL at 20:33

## 2018-06-11 RX ADMIN — INSULIN DETEMIR 10 UNITS: 100 INJECTION, SOLUTION SUBCUTANEOUS at 09:50

## 2018-06-11 RX ADMIN — VANCOMYCIN HYDROCHLORIDE 125 MG: KIT at 03:45

## 2018-06-11 NOTE — PROGRESS NOTES
"Palliative Care Daily Progress Note     support for patient/family    Code Status: Full Code   Advanced Directives: Advance Directive Status: Patient does not have advance directive   Goals of Care: Ongoing.    S: Medical record reviewed. Events noted. Patient alert to self, only complaint is continued dry mouth which is minimally improved with addition of Biotene spray, assist x 1 to bedside chair. Family meeting on 6/10/2018 with Dr. Garnica to discuss patient current health status. According to daughterSienna, at bedside all questions answered and family to plan to talk more with Dr. Ramirez as they have questions about cancer staging and realistic time frame going forward. Family would like to complete MOST form. POA/Healthcare surrogate paperwork today. Family requesting documentation of patient decision making capacity.    ROS:   Negative as otherwise noted above.    O:     /51 (BP Location: Left arm, Patient Position: Lying)   Pulse 79   Temp 97.7 °F (36.5 °C) (Oral)   Resp 18   Ht 167.6 cm (65.98\")   Wt 89.9 kg (198 lb 1.6 oz)   SpO2 96%   BMI 31.99 kg/m²     Intake/Output Summary (Last 24 hours) at 06/11/18 1148  Last data filed at 06/11/18 0932   Gross per 24 hour   Intake              200 ml   Output                0 ml   Net              200 ml       Diagnostics: Reviewed    Patient Active Problem List   Diagnosis   • Thrombocytosis after splenectomy   • Urinary tract infection without hematuria   • Chronic cystitis   • Renal cyst   • Pneumonia symptoms       Physical Exam:  General Appearance: alert, appears stated age, cooperative, overweight and somnolence  Head: normocephalic, without obvious abnormality and atraumatic  Eyes: lids and lashes normal, conjunctivae and sclerae normal, no icterus, no pallor, corneas clear and PERRLA  Throat: dry mucosa  Lungs: clear to auscultation, respirations regular, respirations even and respirations unlabored  Heart: regular rhythm & normal rate and " normal S1, S2  Abdomen: normal bowel sounds, soft non-tender and no guarding  Extremities: moves extremities well, no cyanosis, no redness and trace edema  Pulses: Pulses palpable and equal bilaterally  Skin: no bleeding, bruising or rash  Neurologic: Mental Status alertness somnolence, Speech normal content and execusion and alert to self, Reflexes normal and symmetric  Psych: flat        Patient status: Disease state: Controlled with current treatments.  Functional status: Palliative Performance Scale Score: Performance 40% based on the following measures: Ambulation: Mainly in bed, Activity and Evidence of Disease: Unable to do any work, extensive evidence of disease, Self-Care: Mainly assistance required,  Intake: Normal or reduced, LOC: Full, drowsy or confusion   ECOG Status(3) Capable of limited self-care, confined to bed or chair > 50% of waking hours.  Mental status: somnolence.  Nutritional status: no nutritional compromise at this time. Body mass index is 31.99 kg/m².  Psychosocial issues: Daughters x 4, POA for healthcare identified as son-in-law-who plans to bring in approprate paperwork to scan into medical records.  Family support: The patient receives support from her daughter and extended family..  POA/Healthcare surrogate-Javi Zendejas, POA/Healthcare documentation as described above     Impression/Problem List:     1. cholangicarcinoma recently diagnosed in May 2018, followed by Dr. Ramirez, oncology  2. UTI  3. Dementia on aricept and namenda  4. History of recurrent C. Diff,   5. Insulin dependent diabetes  6. Acute kidney failure with baseline unknown Chronic kidney disease  7. Hypertension  8. Hyperlipidemia  9. Asplenic     Recommendations/Plan:  1. plan: Goals of care include CODE STATUS-Full Code.   -Family provided POA/Healthcare surrogate paperwork and faxed to medical records.  -Family requesting lack of decisional capacity documentation for finances.     2. Xerostomia  -biotene spray as  ordered     Discussed plan of care with JULI Rankin    Thank you for allowing us to participate in patient's plan of care. Palliative Care Team will continue to follow patient as needed.     Time spent: 35 minutes spent reviewing medical and medication records, assessing and examining patient, discussing with family, answering questions, providing some guidance about a plan and documentation of care, and coordinating care with other healthcare members, with > 50% time spent face to face.     JULI Pompa  6/11/2018

## 2018-06-11 NOTE — PLAN OF CARE
"Problem: Patient Care Overview  Goal: Plan of Care Review  Outcome: Ongoing (interventions implemented as appropriate)   06/11/18 0994   Coping/Psychosocial   Plan of Care Reviewed With patient;daughter   OTHER   Outcome Summary Pt received 3 trials of thin and 1 trial of regular solid. Pt did not demonstrate any s/s of aspiration. Pt was extremely fatigued. SLP attempted cognitive tx today. Pt was uncooperative when asked to answer orientation questions. Pt continued to state, \" I just dont care,\" or \"I just don't want to.\" Pt did state her name, but was unwilling to answer the day, month, year, and place. When asked to complete a divergent naming task, pt remained unwilling to participate. Pt's daughter became engaged in the activity and pt continued to be uncooperative. SLP will continue to follow and treat pt until discharge.          "

## 2018-06-11 NOTE — PROGRESS NOTES
[facility] Medicine Services  INPATIENT PROGRESS NOTE    Length of Stay: 7  Date of Admission: 6/4/2018  Primary Care Physician: Magan Lopez MD    Subjective   Chief Complaint: follow up encephalopathy  HPI   Pt is awake and alert. Family state she is more lethargic today. She did get in a chair with therapy. She states she hurts all over. Denies any one specific complaint. Still with loose stools.     Review of Systems   All pertinent negatives and positives are as above. All other systems have been reviewed and are negative unless otherwise stated.     Objective    Temp:  [97.7 °F (36.5 °C)-99.8 °F (37.7 °C)] 97.7 °F (36.5 °C)  Heart Rate:  [59-79] 79  Resp:  [16-18] 18  BP: (123-164)/(48-70) 140/51  Physical Exam   Constitutional: She is oriented to person, place, and time. She appears well-developed and well-nourished.   HENT:   Head: Normocephalic and atraumatic.   Eyes: Conjunctivae and EOM are normal. Pupils are equal, round, and reactive to light.   Neck: Neck supple. No JVD present. No thyromegaly present.   Cardiovascular: Normal rate, regular rhythm, normal heart sounds and intact distal pulses.  Exam reveals no gallop and no friction rub.    No murmur heard.  Sinus 61-75   Pulmonary/Chest: Effort normal and breath sounds normal. No respiratory distress. She has no wheezes. She has no rales. She exhibits no tenderness.   Abdominal: Soft. Bowel sounds are normal. She exhibits no distension. There is no tenderness. There is no rebound and no guarding.   Musculoskeletal: Normal range of motion. She exhibits no edema, tenderness or deformity.   Lymphadenopathy:     She has no cervical adenopathy.   Neurological: She is alert and oriented to person, place, and time. She displays normal reflexes. No cranial nerve deficit. She exhibits normal muscle tone.   Drowsy but awakens and will talk.    Skin: Skin is warm and dry. No rash noted.   Psychiatric: She has a normal mood  and affect. Her behavior is normal. Judgment and thought content normal.     Results Review:  I have reviewed the labs, radiology results, and diagnostic studies.    Laboratory Data:     Results from last 7 days  Lab Units 06/10/18  0615 06/08/18  0551 06/07/18  0548   WBC 10*3/mm3 31.62* 29.55* 26.35*   HEMOGLOBIN g/dL 12.8 13.0 12.2   HEMATOCRIT % 40.5 41.1 38.1   PLATELETS 10*3/mm3 559* 554* 561*       Results from last 7 days  Lab Units 06/11/18  1108 06/10/18  0615 06/09/18  1316   SODIUM mmol/L 144 141 144   POTASSIUM mmol/L 3.5 3.9 3.6   CHLORIDE mmol/L 114* 112* 114*   CO2 mmol/L 19.0* 20.0* 18.0*   BUN mg/dL 11 10 12   CREATININE mg/dL 0.91 0.82 0.97   CALCIUM mg/dL 11.5* 11.1* 11.0*   BILIRUBIN mg/dL 0.5 0.6 0.5   ALK PHOS U/L 184* 185* 174*   ALT (SGPT) U/L 22 29 23   AST (SGOT) U/L 140* 132* 128*   GLUCOSE mg/dL 149* 106* 131*     Culture Data:   Blood Culture   Date Value Ref Range Status   06/06/2018 No growth at 4 days  Preliminary   06/04/2018 No growth at 5 days  Final   06/04/2018 Staphylococcus, coagulase negative (A)  Final     Urine Culture   Date Value Ref Range Status   06/04/2018 (A)  Final    >100,000 CFU/mL Klebsiella pneumoniae ssp pneumoniae     Radiology Data:   Imaging Results (last 24 hours)     ** No results found for the last 24 hours. **          I have reviewed the patient current medications.     Assessment/Plan   Assessment:  1. Left upper lobe patchy infiltrate concerning for pneumonia- healthcare associated. Repeat chest x-ray reveals resolution of the infiltrate  2. Urinary tract infection, urine culture with growth of Klebsiella pneumoniae  3. Acute metabolic encephalopathy- likely secondary to above  4. Acute kidney injury on chronic kidney disease- difficult to ascertain baseline kidney function.  Improved  5. Leukocytosis-chronic  6. Recent diagnosis of cholangicarcinoma  7. Clostridium Difficile colitis with recurrence  8. Dementia  9. Insulin dependent Diabetes Mellitus  Type 2, hgb A1c 6.8%  10. Asplenic  11. Thrombocytosis  12. Elevated Ammonia level-improved  13. Hypercalcemia-Post zometa 6/9-persistent hypercalcemia     Plan:  1. Aztreonam day 7/7  2. Pt is being difficult to take her medications. She is only taking a few medications at a time.   3. Continue lactulose.   4. I have changed megace to tablets.   5. Awaiting oncology eval.   6. Repeat labs in am.       Discharge Planning: I expect the patient to be discharged to SNf in ? days.    JULI Beyer   06/11/18   2:25 PM     I personally evaluated and examined the patient in conjunction with JULI Rankin and agree with the assessment, treatment plan, and disposition of the patient as recorded by her. My history, exam, and further recommendations are: I have reviewed and agree with the plans. Robert.

## 2018-06-11 NOTE — PLAN OF CARE
Problem: Patient Care Overview  Goal: Plan of Care Review  Outcome: Ongoing (interventions implemented as appropriate)   06/11/18 1506   Coping/Psychosocial   Plan of Care Reviewed With patient   Plan of Care Review   Progress no change   Pt refusing some medications today. Pt is not very agreeable today. Needs encouragement on physical therapy and taking medications. Assist with turning as needed. Pain pill given once. BM x1.   Goal: Individualization and Mutuality  Outcome: Ongoing (interventions implemented as appropriate)    Goal: Discharge Needs Assessment  Outcome: Ongoing (interventions implemented as appropriate)      Problem: Fall Risk (Adult)  Goal: Absence of Fall  Outcome: Ongoing (interventions implemented as appropriate)   06/11/18 1506   Fall Risk (Adult)   Absence of Fall making progress toward outcome       Problem: Skin Injury Risk (Adult)  Goal: Skin Health and Integrity  Outcome: Ongoing (interventions implemented as appropriate)   06/11/18 1506   Skin Injury Risk (Adult)   Skin Health and Integrity making progress toward outcome       Problem: Infection, Risk/Actual (Adult)  Goal: Infection Prevention/Resolution  Outcome: Ongoing (interventions implemented as appropriate)   06/11/18 1506   Infection, Risk/Actual (Adult)   Infection Prevention/Resolution making progress toward outcome

## 2018-06-11 NOTE — THERAPY TREATMENT NOTE
Acute Care - Occupational Therapy Treatment Note  [facility]     Patient Name: Bindu Fountain  : 1945  MRN: [id number]  Today's Date: 2018  Onset of Illness/Injury or Date of Surgery: 18  Date of Referral to OT: 18  Referring Physician: JULI Quevedo    Admit Date: 2018       ICD-10-CM ICD-9-CM   1. Urinary tract infection without hematuria, site unspecified N39.0 599.0   2. Pneumonia due to infectious organism, unspecified laterality, unspecified part of lung J18.9 136.9     484.8   3. Altered mental status, unspecified altered mental status type R41.82 780.97   4. Clostridium difficile infection B96.89 041.84   5. Oral phase dysphagia R13.11 787.21   6. Decreased activities of daily living (ADL) Z78.9 V49.89   7. Impaired cognition R41.89 294.9   8. Impaired mobility and endurance Z74.09 V49.89     Patient Active Problem List   Diagnosis   • Thrombocytosis after splenectomy   • Urinary tract infection without hematuria   • Chronic cystitis   • Renal cyst   • Pneumonia symptoms     Past Medical History:   Diagnosis Date   • Cancer     liver   • Dementia    • Diabetes    • Dysphagia    • High cholesterol    • Hypertension    • Laryngopharyngeal reflux    • Lingual tonsil hypertrophy    • Thyroid nodule      Past Surgical History:   Procedure Laterality Date   • APPENDECTOMY     • CHOLECYSTECTOMY     • FINE NEEDLE ASPIRATION     • KNEE SURGERY     • SPLENECTOMY     • TONSILLECTOMY         Therapy Treatment          Rehabilitation Treatment Summary     Row Name 18 1040 18 1013 18 0917       Treatment Time/Intention    Discipline physical therapy assistant  -AH occupational therapy assistant  -TS speech language pathologist  -TREVON SOUSA MB2    Document Type  -- therapy note (daily note)  -TS therapy note (daily note)  -TREVON SOUSA MB2    Subjective Information  -- complains of  -TS2 complains of;fatigue  -TREVON SOUSA MB2    Mode of Treatment  --  -- speech-language pathology   -MB,TREVON,MB2    Patient/Family Observations  --  -- daughter present in room  -MB,TREVON,MB2    Patient Effort  -- adequate  -TS3 poor  -MB,TREVON,MB2    Comment with BEJARANO, requested to check back later  -  --  --    Existing Precautions/Restrictions  -- fall  -TS3  --    Treatment Considerations/Comments  -- c diff   -TS3  --    Recorded by [] Capri Preciado, PTA 06/11/18 1041 [TS] Anu Herrera, BEJARANO/L 06/11/18 1013  [TS2] Anu Herrera, BEJARANO/L 06/11/18 1114  [TS3] Anucarlee Lawsonin, BEJARANO/L 06/11/18 1124 [MB,TREVON,MB2] Jhonatan Sanders, CCC-SLP (r) Susanne Miller, Speech Therapy Student (t) Jhonatan Sanders CCC-SLP (c) 06/11/18 1016    Row Name 06/11/18 1013             Cognitive Assessment/Intervention- PT/OT    Personal Safety Interventions fall prevention program maintained;gait belt;nonskid shoes/slippers when out of bed  -TS      Recorded by [TS] Anu Herrera, BEJARANO/L 06/11/18 1124      Row Name 06/11/18 1013             Bed Mobility Assessment/Treatment    Bed Mobility Assessment/Treatment scooting/bridging;supine-sit  -TS      Scooting/Bridging Thayer (Bed Mobility) maximum assist (25% patient effort)  -TS      Supine-Sit [place] (Bed Mobility) moderate assist (50% patient effort);verbal cues  -TS      Assistive Device (Bed Mobility) draw sheet;head of bed elevated;bed rails  -TS      Recorded by [TS] Anu Herrera, BEJARANO/L 06/11/18 1124      Row Name 06/11/18 1013             Functional Mobility    Functional Mobility- Ind. Level contact guard assist;minimum assist (75% patient effort)  -TS      Functional Mobility- Device rolling walker  -TS      Functional Mobility- Comment in room, took steps from EOB to recliner  -TS      Recorded by [TS] Anu Herrera, BEJARANO/L 06/11/18 1124      Row Name 06/11/18 1013             Transfer Assessment/Treatment    Transfer Assessment/Treatment sit-stand transfer;stand-sit transfer  -TS      Sit-Stand [place]ayer (Transfers) contact  guard;minimum assist (75% patient effort)  -TS      Stand-Sit [place] (Transfers) minimum assist (75% patient effort)  -TS      Recorded by [TS] PAUL TaA/L 06/11/18 1124      Row Name 06/11/18 1013             Sit-Stand Transfer    Assistive Device (Sit-Stand Transfers) walker, front-wheeled  -TS      Recorded by [TS] Anu Herrera BEJARANO/L 06/11/18 1124      Row Name 06/11/18 1013             Stand-Sit Transfer    Assistive Device (Stand-Sit Transfers) walker, front-wheeled  -TS      Recorded by [TS] Anu Herrera BEJARANO/L 06/11/18 1124      Row Name 06/11/18 1013             ADL Assessment/Intervention    BADL Assessment/Intervention lower body dressing  -TS      Recorded by [TS] Anu Herrera BEJARANO/L 06/11/18 1124      Row Name 06/11/18 1013             Lower Body Dressing Assessment/Training    Lower Body Dressing Larsen Level don;doff;undergarment  -TS      Lower Body Dressing Position edge of bed sitting;supported standing  -TS      Recorded by [TS] PAUL TaA/L 06/11/18 1124      Row Name 06/11/18 1013             Toileting Assessment/Training    [place] Level (Toileting) perform perineal hygiene;maximum assist (25% patient effort)  -TS      Toileting Position supported standing  -TS      Recorded by [TS] Anu Herrera BEJARANO/L 06/11/18 1124      Row Name 06/11/18 1013             Positioning and Restraints    Pre-Treatment Position in bed  -TS      Post Treatment Position chair  -TS      In Chair reclined;call light within reach;encouraged to call for assist;with family/caregiver;with other staff  -TS      Recorded by [TS] PAUL TaA/L 06/11/18 1124      Row Name 06/11/18 1013             Pain Scale: Numbers Pre/Post-Treatment    Pain Scale: Numbers, Pretreatment 0/10 - no pain  -TS      Recorded by [TS] PAUL TaA/L 06/11/18 1124      Row Name 06/11/18 0917             Outcome Summary/Treatment Plan (SLP)     Daily Summary of Progress (SLP) progress toward functional goals is gradual  -TREVON SOUSA,MB2      Barriers to Overall Progress (SLP) fatigue   -TREVON SOUSA MB2      Plan for Continued Treatment (SLP) Plan to follow pt until discharge   -TREVON SOUSA,MB2      Anticipated Dischage Disposition skilled nursing facility  -TREVON SOUSA MB2      Recorded by [TREVON SOUSA,MB2] Jhonatan Sanders CCC-SLP (r) Susanne Miller, Speech Therapy Student (t) Jhonatan Sanders CCC-SLP (c) 06/11/18 1016        User Key  (r) = Recorded By, (t) = Taken By, (c) = Cosigned By    Initials Name Effective Dates Discipline    MERRY Sanders CCC-SLP 08/02/16 -  SLP    HANS Preciado, PTA 08/02/16 -  PT    TS Anu Herrera, FLYNN/L 08/02/16 -  OT    TREVON Miller, Speech Therapy Student 05/04/18 -  SLP               Occupational Therapy Education     Title: PT OT SLP Therapies (Active)     Topic: Occupational Therapy (Active)     Point: ADL training (Done)     Description: Instruct learner(s) on proper safety adaptation and remediation techniques during self care or transfers.   Instruct in proper use of assistive devices.   Learning Progress Summary     Learner Status Readiness Method Response Comment Documented by    Patient Done Acceptance E,TB VU transfers, benefits of activity TS 06/11/18 1124     Done Acceptance E,TB VU transfers, benefits of activity, ADLs TS 06/08/18 1500     Done Acceptance E,TB VU OT POC, benefits of activity AC 06/05/18 1455    Family Done Acceptance E,TB VU OT POC, benefits of activity AC 06/05/18 1455          Point: Home exercise program (Active)     Description: Instruct learner(s) on appropriate technique for monitoring, assisting and/or progressing therapeutic exercises/activities.   Learning Progress Summary     Learner Status Readiness Method Response Comment Documented by    Patient Active Acceptance E,D NR Pt. required min. assist from this flynn/l and family to roll side/side for scooting up in bed! Pt. attempted to  perform ue exs, but ubable to follow instructions, Pt. seen 2x this date to attempt tx, pts. paticipation ability poor today!  06/06/18 1503     Done Acceptance E,TB VU OT POC, benefits of activity  06/05/18 1455    Family Active Acceptance E,D NR Pt. required min. assist from this flynn/l and family to roll side/side for scooting up in bed! Pt. attempted to perform ue exs, but ubable to follow instructions, Pt. seen 2x this date to attempt tx, pts. paticipation ability poor today!  06/06/18 1503     Done Acceptance E,TB VU OT POC, benefits of activity  06/05/18 1455                      User Key     Initials Effective Dates Name Provider Type Discipline     06/22/15 -  Venkat Flores, OTR/L Occupational Therapist OT     08/02/16 -  Esteban Carrasco FLYNN/L Occupational Therapy Assistant OT     08/02/16 -  Anu Herrera, FLYNN/L Occupational Therapy Assistant OT                OT Recommendation and Plan     Plan of Care Review  Plan of Care Reviewed With: patient  Plan of Care Reviewed With: patient  Outcome Summary: Pt required encouragement to participate but did work with therapy. Pt mod A for supine to sit with EOB elevated. Pt transfers from EOB with min A with RW. Max A for LB dressing and toileting. Pt was able to ambulate in room with RW a few steps with CGA/min A. Pt family discussing pallitive care. OT will continue to work with pt at this time. Continue OT POC         Outcome Measures     Row Name 06/11/18 1100 06/09/18 1400 06/08/18 1400       How much help from another person do you currently need...    Turning from your back to your side while in flat bed without using bedrails?  -- 2  -KR  --    Moving from lying on back to sitting on the side of a flat bed without bedrails?  -- 2  -KR  --    Moving to and from a bed to a chair (including a wheelchair)?  -- 2  -KR  --    Standing up from a chair using your arms (e.g., wheelchair, bedside chair)?  -- 2  -KR  --    Climbing 3-5 steps  with a railing?  -- 1  -KR  --    To walk in hospital room?  -- 2  -KR  --    AM-PAC 6 Clicks Score  -- 11  -KR  --       How much help from another is currently needed...    Putting on and taking off regular lower body clothing? 2  -TS  -- 2  -TS    Bathing (including washing, rinsing, and drying) 2  -TS  -- 2  -TS    Toileting (which includes using toilet bed pan or urinal) 2  -TS  -- 2  -TS    Putting on and taking off regular upper body clothing 3  -TS  -- 3  -TS    Taking care of personal grooming (such as brushing teeth) 3  -TS  -- 3  -TS    Eating meals 3  -TS  -- 3  -TS    Score 15  -TS  -- 15  -TS       Functional Assessment    Outcome Measure Options AM-PAC 6 Clicks Daily Activity (OT)  -TS AM-PAC 6 Clicks Basic Mobility (PT)  -KR AM-PAC 6 Clicks Daily Activity (OT)  -TS      User Key  (r) = Recorded By, (t) = Taken By, (c) = Cosigned By    Initials Name Provider Type     DARCI Ta/L Occupational Therapy Assistant    ARLINE Winter, PT DPT Physical Therapist           Time Calculation:         Time Calculation- OT     Row Name 06/11/18 1129             Time Calculation- OT    OT Start Time 1013  -TS      OT Stop Time 1037  -TS      OT Time Calculation (min) 24 min  -TS      Total Timed Code Minutes- OT 24 minute(s)  -TS      OT Received On 06/11/18  -TS        User Key  (r) = Recorded By, (t) = Taken By, (c) = Cosigned By    Initials Name Provider Type     DARCI Ta/L Occupational Therapy Assistant           Therapy Charges for Today     Code Description Service Date Service Provider Modifiers Qty    [id number]  OT SELF CARE/MGMT/TRAIN EA 15 MIN 6/11/2018 DARCI Ta/L GO, KX 2          OT G-codes  OT Professional Judgement Used?: Yes  OT Functional Scales Options: AM-PAC 6 Clicks Daily Activity (OT)  Score: 13  Functional Limitation: Self care  Self Care Current Status (): At least 60 percent but less than 80 percent impaired, limited or  restricted  Self Care Goal Status (): At least 40 percent but less than 60 percent impaired, limited or restricted    DARCI Garzon/MARIELA  6/11/2018

## 2018-06-11 NOTE — PLAN OF CARE
Problem: Patient Care Overview  Goal: Plan of Care Review  Outcome: Ongoing (interventions implemented as appropriate)   06/11/18 0556   Coping/Psychosocial   Plan of Care Reviewed With patient;daughter   Plan of Care Review   Progress improving   OTHER   Outcome Summary Medicated for c/o generalized pain with prn ultram, with relief noted. Pt alert and confused. Turns self, no new skin breakdown noted. Temp 99.8 this morning. Receiving abx. Continue on contact isolation. Safety maintained.      Goal: Individualization and Mutuality  Outcome: Ongoing (interventions implemented as appropriate)    Goal: Discharge Needs Assessment  Outcome: Ongoing (interventions implemented as appropriate)    Goal: Interprofessional Rounds/Family Conf  Outcome: Ongoing (interventions implemented as appropriate)      Problem: Fall Risk (Adult)  Goal: Absence of Fall  Outcome: Ongoing (interventions implemented as appropriate)      Problem: Skin Injury Risk (Adult)  Goal: Skin Health and Integrity  Outcome: Ongoing (interventions implemented as appropriate)      Problem: Infection, Risk/Actual (Adult)  Goal: Infection Prevention/Resolution  Outcome: Ongoing (interventions implemented as appropriate)

## 2018-06-11 NOTE — PROGRESS NOTES
Continued Stay Note  [facility]     Patient Name: Bindu Fountain  MRN: [id number]  Today's Date: 6/11/2018    Admit Date: 6/4/2018          Discharge Plan     Row Name 06/11/18 1525       Plan    Plan [facility]; SKILLED     Patient/Family in Agreement with Plan yes    Plan Comments REC'D REPORT FROM PT'S DTR THAT FAMILY HAS DECIDED THEY WANT TO ACCEPT BED OFFER AT [facility].  CONTACTED [facility] TO CONFIRM.  WILL FOLLOW TO COORDINATE PT'S TRANSFER.                Discharge Codes    No documentation.           DAWNA Mackenzie

## 2018-06-11 NOTE — THERAPY TREATMENT NOTE
"Acute Care - Speech Language Pathology Treatment Note  [facility]     Patient Name: Bindu Fountain  : 1945  MRN: [id number]  Today's Date: 2018         Admit Date: 2018  Pt received 3 trials of thin and 1 trial of regular solid. Pt did not demonstrate any s/s of aspiration. Pt was extremely fatigued. SLP attempted cognitive tx today. Pt was uncooperative when asked to answer orientation questions. Pt continued to state, \" I just dont care,\" or \"I just don't want to.\" Pt did state her name, but was unwilling to answer the day, month, year, and place. When asked to complete a divergent naming task, pt remained unwilling to participate. Pt's daughter became engaged in the activity and pt continued to be uncooperative. SLP will continue to follow and treat pt until discharge.   Susanne Miller, Speech Therapy Student 2018 9:59 AM  Visit Dx:      ICD-10-CM ICD-9-CM   1. Urinary tract infection without hematuria, site unspecified N39.0 599.0   2. Pneumonia due to infectious organism, unspecified laterality, unspecified part of lung J18.9 136.9     484.8   3. Altered mental status, unspecified altered mental status type R41.82 780.97   4. Clostridium difficile infection B96.89 041.84   5. Oral phase dysphagia R13.11 787.21   6. Decreased activities of daily living (ADL) Z78.9 V49.89   7. Impaired cognition R41.89 294.9   8. Impaired mobility and endurance Z74.09 V49.89     Patient Active Problem List   Diagnosis   • Thrombocytosis after splenectomy   • Urinary tract infection without hematuria   • Chronic cystitis   • Renal cyst   • Pneumonia symptoms        Therapy Treatment    Therapy Treatment / Health Promotion    Treatment Time/Intention  Discipline: (P) speech language pathologist (18 0917 : Susanne Miller, Speech Therapy Student)  Document Type: (P) therapy note (daily note) (18 09 : Susanne Miller, Speech Therapy Student)  Subjective Information: (P) complains of, fatigue (18 " 0917 : Susanne Miller Speech Therapy Student)  Mode of Treatment: (P) speech-language pathology (06/11/18 0917 : Karel Shea Therapy Student)  Patient/Family Observations: (P) daughter present in room (06/11/18 0917 : Karel Shea Therapy Student)  Patient Effort: (P) poor (06/11/18 0917 : Karel Shea Therapy Student)  Plan of Care Review  Plan of Care Reviewed With: (P) patient, daughter (06/11/18 0949 : Karel Shea Therapy Student)    Vitals/Pain/Safety       Cognition, Communication, Swallow  Recommendations  Anticipated Dischage Disposition: (P) skilled nursing facility (06/11/18 0917 : Karel Shea Therapy Student)    Outcome Summary  Outcome Summary/Treatment Plan (SLP)  Daily Summary of Progress (SLP): (P) progress toward functional goals is gradual (06/11/18 0917 : Karel Shea Therapy Student)  Barriers to Overall Progress (SLP): (P) fatigue  (06/11/18 0917 : Susanne Miller Speech Therapy Student)  Plan for Continued Treatment (SLP): (P) Plan to follow pt until discharge  (06/11/18 0917 : Karel Shea Therapy Student)  Anticipated Dischage Disposition: (P) skilled nursing facility (06/11/18 0917 : Karel Shea Therapy Student)      EDUCATION  The patient has been educated in the following areas:   Cognitive Impairment Dysphagia (Swallowing Impairment).    SLP Recommendation and Plan  SLP Diagnosis: Cognitive-linguistic deficit   Rehab Potential/Prognosis: fair     Anticipated Dischage Disposition: (P) skilled nursing facility        Predicted Duration Therapy Intervention (Days): until discharge       Plan of Care Reviewed With: (P) patient, daughter  Plan of Care Review  Plan of Care Reviewed With: (P) patient, daughter  Daily Summary of Progress (SLP): (P) progress toward functional goals is gradual  Plan for Continued Treatment (SLP): (P) Plan to follow pt until discharge   Outcome Summary: (P) Pt received 3 trials of  "thin and 1 trial of regular solid. Pt did not demonstrate any s/s of aspiration. Pt was extremely fatigued. SLP attempted cognitive tx today. Pt was uncooperative when asked to answer orientation questions. Pt continued to state, \" I just dont care,\" or \"I just don't want to.\" Pt did state her name, but was unwilling to answer the day, month, year, and place. When asked to complete a divergent naming task, pt remained unwilling to participate. Pt's daughter became engaged in the activity and pt continued to be uncooperative. SLP will continue to follow and treat pt until discharge.           SLP GOALS     Row Name 06/11/18 0917             Oral Nutrition/Hydration Goal 1 (SLP)    Oral Nutrition/Hydration Goal 1, SLP (P)  Pt will tolerate least restrictive diet with no overt s/s of aspiration.  -      Time Frame (Oral Nutrition/Hydration Goal 1, SLP) (P)  by discharge  -      Barriers (Oral Nutrition/Hydration Goal 1, SLP) (P)  n/a  -      Progress/Outcomes (Oral Nutrition/Hydration Goal 1, SLP) (P)  continuing progress toward goal  -         Words/Phrases/Sentences Goal 1 (SLP)    Improve Ability to Comprehend Words/Phrases/Sentences Through: Goal 1 (SLP) (P)  identify body part;independently (over 90% accuracy)  -      Time Frame (Identify Objects and Pictures Goal 1, SLP) (P)  short term goal (STG);by discharge  -      Barriers (Identify Objects and Pictures Goal 1, SLP) (P)  fatigue  -      Progress/Outcomes (Identify Objects and Pictures Goal 1, SLP) (P)  goal ongoing  -         Comprehend Questions Goal 1 (SLP)    Improve Ability to Comprehend Questions Goal 1 (SLP) (P)  simple yes/no questions;independently (over 90% accuracy)  -      Time Frame (Comprehend Questions Goal 1, SLP) (P)  short term goal (STG);by discharge  -      Barriers (Comprehend Questions Goal 1, SLP) (P)  fatigue  -      Progress/Outcomes (Comprehend Questions Goal 1, SLP) (P)  goal ongoing  -         Follow " Directions Goal 2 (SLP)    Improve Ability to Follow Directions Goal 1 (SLP) (P)  2 step commands;independently (over 90% accuracy)  -MH      Time Frame (Follow Directions Goal 1, SLP) (P)  short term goal (STG);by discharge  -MH      Barriers (Improve Ability to Follow Directions Goal 1, SLP) (P)  fatigue   -MH      Progress/Outcomes (Follow Directions Goal 1, SLP) (P)  goal ongoing  -MH         Word Retrieval Skills Goal 1 (SLP)    Improve Word Retrieval Skills By Goal 1 (SLP) (P)  completing automatic speech task, days of the week;completing automatic speech task, months;repeating words  -MH      Time Frame (Word Retrieval Goal 1, SLP) (P)  short term goal (STG);by discharge  -MH      Barriers (Word Retrieval Goal 1, SLP) (P)  fatigue   -MH      Progress (Word Retrieval Skills Goal 1, SLP) (P)  with minimal cues (75-90%)  -      Progress/Outcomes (Word Retrieval Goal 1, SLP) (P)  goal ongoing  -MH         Ability to Construct Phrase and Sentence Level Response Goal 1 (SLP)    Improve Ability to Construct Phrase and Sentence Level Responses By Goal 1 (SLP) (P)  constructing a sentence with a key word;with minimal cues (75-90%)  -      Time Frame (Phrase and Sentence Level Response Goal 1, SLP) (P)  short term goal (STG);by discharge  -MH      Barriers (Phrase and Sentence Level Response Goal 1, SLP) (P)  fatigue  -MH      Progress (Construct Phrase and Sentence Level Response Goal 1, SLP) (P)  with minimal cues (75-90%)  -MH      Progress/Outcomes (Phrase and Sentence Level Response Goal 1, SLP) (P)  goal ongoing  -MH         Orientation Goal 1 (SLP)    Improve Orientation Through Goal 1 (SLP) (P)  demonstrating orientation to day;demonstrating orientation to month;demonstrating orientation to place;demonstrating orientation to year;with minimal cues (75-90%)  -      Time Frame (Orientation Goal 1, SLP) (P)  short term goal (STG);by discharge  -MH      Barriers (Orientation Goal 1, SLP) (P)  fatigue  -MH       Progress/Outcomes (Orientation Goal 1, SLP) (P)  unable to make needed progress  -         Organizational Skills Goal 1 (SLP)    Improve Thought Organization Through Goal 1 (SLP) (P)  completing a divergent naming task;completing a convergent naming task;naming similarities and differences;with minimal cues (75-90%)  -      Time Frame (Thought Organization Skills Goal 1, SLP) (P)  short term goal (STG);by discharge  -      Barriers (Thought Organization Skills Goal 1, SLP) (P)  fatigue  -MH      Progress (Thought Organization Skills Goal 1, SLP) (P)  0%  -      Progress/Outcomes (Thought Organization Skills Goal 1, SLP) (P)  unable to make needed progress  -         Reasoning Goal 1 (SLP)    Improve Reasoning Through Goal 1 (SLP) (P)  complete basic reasoning task;complete analogies;identify absurdities;with minimal cues (75-90%)  -      Time Frame (Reasoning Goal 1, SLP) (P)  short term goal (STG);by discharge  -      Barriers (Reasoning Goal 1, SLP) (P)  fatigue  -      Progress/Outcomes (Reasoning Goal 1, SLP) (P)  goal ongoing  -         Functional Problem Solving Skills Goal 1 (SLP)    Improve Problem Solving Through Goal 1 (SLP) (P)  determine solutions to simple ADL/safety problems;with minimal cues (75-90%)  -      Time Frame (Problem Solving Goal 1, SLP) (P)  short term goal (STG);by discharge  -      Barriers (Problem Solving Goal 1, SLP) (P)  fatigue  -      Progress/Outcomes (Problem Solving Goal 1, SLP) (P)  goal ongoing  -         Executive Functional Skills Goal 1 (SLP)    Improve Executive Function Skills Goal 1 (SLP) (P)  demonstrate awareness of deficit;organization/planning activity;with minimal cues (75-90%)  -      Time Frame (Executive Function Skills Goal 1, SLP) (P)  short term goal (STG);by discharge  -      Barriers (Executive Function Skills Goal 1, SLP) (P)  fatigue  -      Progress/Outcomes (Executive Function Skills Goal 1, SLP) (P)  goal ongoing  -         User Key  (r) = Recorded By, (t) = Taken By, (c) = Cosigned By    Initials Name Provider Type     Susanne Miller Speech Therapy Student Speech Therapy Student                Time Calculation:           Time Calculation- SLP     Row Name 18 0956             Time Calculation- SLP    SLP Start Time (P)  0917  -      SLP Stop Time (P)  0941  -      SLP Time Calculation (min) (P)  24 min  -      SLP Received On (P)  18  -        User Key  (r) = Recorded By, (t) = Taken By, (c) = Cosigned By    Initials Name Provider Type     Susanne Miller Speech Therapy Student Speech Therapy Student          Therapy Charges for Today     Code Description Service Date Service Provider Modifiers Qty    65084178565 HC ST TREATMENT SWALLOW 1 2018 Susanne Miller Speech Therapy Student GN, KX 1    [id number] HC ST TREATMENT SPEECH 1 2018 Susanne Miller Speech Therapy Student TRISTAN, KX 1              SLP G-Codes  SLP NOMS Used?: Yes  Functional Limitations: (S) Other Speech Language Pathology (problem solving)  Swallow Current Status (): At least 20 percent but less than 40 percent impaired, limited or restricted  Swallow Goal Status (): At least 20 percent but less than 40 percent impaired, limited or restricted  Other Speech-Language Pathology Functional Limitation Current Status (): At least 60 percent but less than 80 percent impaired, limited or restricted  Other Speech-Language Pathology Functional Limitation Goal Status (): At least 60 percent but less than 80 percent impaired, limited or restricted  Other Speech-Language Pathology Functional Limitation Discharge Status (): At least 60 percent but less than 80 percent impaired, limited or restricted      Susanne Miller Speech Therapy Student  2018   and Acute Care - Speech Language Pathology   Swallow Treatment Note [facility]     Patient Name: Bindu Fountain  : 1945  MRN: [id number]  Today's Date:  6/11/2018  Onset of Illness/Injury or Date of Surgery: 06/04/18     Referring Physician: JULI Quevedo      Admit Date: 6/4/2018    Visit Dx:      ICD-10-CM ICD-9-CM   1. Urinary tract infection without hematuria, site unspecified N39.0 599.0   2. Pneumonia due to infectious organism, unspecified laterality, unspecified part of lung J18.9 136.9     484.8   3. Altered mental status, unspecified altered mental status type R41.82 780.97   4. Clostridium difficile infection B96.89 041.84   5. Oral phase dysphagia R13.11 787.21   6. Decreased activities of daily living (ADL) Z78.9 V49.89   7. Impaired cognition R41.89 294.9   8. Impaired mobility and endurance Z74.09 V49.89     Patient Active Problem List   Diagnosis   • Thrombocytosis after splenectomy   • Urinary tract infection without hematuria   • Chronic cystitis   • Renal cyst   • Pneumonia symptoms       Therapy Treatment    Therapy Treatment / Health Promotion    Treatment Time/Intention  Discipline: (P) speech language pathologist (06/11/18 0917 : Susanne Miller Speech Therapy Student)  Document Type: (P) therapy note (daily note) (06/11/18 0917 : Susanne Miller Speech Therapy Student)  Subjective Information: (P) complains of, fatigue (06/11/18 0917 : Susanne Miller Speech Therapy Student)  Mode of Treatment: (P) speech-language pathology (06/11/18 0917 : Susanne Miller Speech Therapy Student)  Patient/Family Observations: (P) daughter present in room (06/11/18 0917 : Susanne Miller Speech Therapy Student)  Patient Effort: (P) poor (06/11/18 0917 : Susanne Miller Speech Therapy Student)  Plan of Care Review  Plan of Care Reviewed With: (P) patient, daughter (06/11/18 0949 : Karel Shea Therapy Student)    Vitals/Pain/Safety       Cognition, Communication, Swallow  Recommendations  Anticipated Dischage Disposition: (P) skilled nursing facility (06/11/18 0917 : Karel Shea Therapy Student)    Outcome Summary  Outcome  Summary/Treatment Plan (SLP)  Daily Summary of Progress (SLP): (P) progress toward functional goals is gradual (06/11/18 0917 : Susanne Miller, Speech Therapy Student)  Barriers to Overall Progress (SLP): (P) fatigue  (06/11/18 0917 : Susanne Miller, Speech Therapy Student)  Plan for Continued Treatment (SLP): (P) Plan to follow pt until discharge  (06/11/18 0917 : Susanne Miller, Speech Therapy Student)  Anticipated Dischage Disposition: (P) skilled nursing facility (06/11/18 0917 : Susanne Miller, Speech Therapy Student)            SLP GOALS     Row Name 06/11/18 0917             Oral Nutrition/Hydration Goal 1 (SLP)    Oral Nutrition/Hydration Goal 1, SLP (P)  Pt will tolerate least restrictive diet with no overt s/s of aspiration.  -      Time Frame (Oral Nutrition/Hydration Goal 1, SLP) (P)  by discharge  -      Barriers (Oral Nutrition/Hydration Goal 1, SLP) (P)  n/a  -      Progress/Outcomes (Oral Nutrition/Hydration Goal 1, SLP) (P)  continuing progress toward goal  -         Words/Phrases/Sentences Goal 1 (SLP)    Improve Ability to Comprehend Words/Phrases/Sentences Through: Goal 1 (SLP) (P)  identify body part;independently (over 90% accuracy)  -      Time Frame (Identify Objects and Pictures Goal 1, SLP) (P)  short term goal (STG);by discharge  -      Barriers (Identify Objects and Pictures Goal 1, SLP) (P)  fatigue  -MH      Progress/Outcomes (Identify Objects and Pictures Goal 1, SLP) (P)  goal ongoing  -         Comprehend Questions Goal 1 (SLP)    Improve Ability to Comprehend Questions Goal 1 (SLP) (P)  simple yes/no questions;independently (over 90% accuracy)  -      Time Frame (Comprehend Questions Goal 1, SLP) (P)  short term goal (STG);by discharge  -      Barriers (Comprehend Questions Goal 1, SLP) (P)  fatigue  -      Progress/Outcomes (Comprehend Questions Goal 1, SLP) (P)  goal ongoing  -         Follow Directions Goal 2 (SLP)    Improve Ability to Follow Directions  Goal 1 (SLP) (P)  2 step commands;independently (over 90% accuracy)  -MH      Time Frame (Follow Directions Goal 1, SLP) (P)  short term goal (STG);by discharge  -MH      Barriers (Improve Ability to Follow Directions Goal 1, SLP) (P)  fatigue   -MH      Progress/Outcomes (Follow Directions Goal 1, SLP) (P)  goal ongoing  -MH         Word Retrieval Skills Goal 1 (SLP)    Improve Word Retrieval Skills By Goal 1 (SLP) (P)  completing automatic speech task, days of the week;completing automatic speech task, months;repeating words  -MH      Time Frame (Word Retrieval Goal 1, SLP) (P)  short term goal (STG);by discharge  -MH      Barriers (Word Retrieval Goal 1, SLP) (P)  fatigue   -MH      Progress (Word Retrieval Skills Goal 1, SLP) (P)  with minimal cues (75-90%)  -MH      Progress/Outcomes (Word Retrieval Goal 1, SLP) (P)  goal ongoing  -         Ability to Construct Phrase and Sentence Level Response Goal 1 (SLP)    Improve Ability to Construct Phrase and Sentence Level Responses By Goal 1 (SLP) (P)  constructing a sentence with a key word;with minimal cues (75-90%)  -      Time Frame (Phrase and Sentence Level Response Goal 1, SLP) (P)  short term goal (STG);by discharge  -MH      Barriers (Phrase and Sentence Level Response Goal 1, SLP) (P)  fatigue  -MH      Progress (Construct Phrase and Sentence Level Response Goal 1, SLP) (P)  with minimal cues (75-90%)  -MH      Progress/Outcomes (Phrase and Sentence Level Response Goal 1, SLP) (P)  goal ongoing  -         Orientation Goal 1 (SLP)    Improve Orientation Through Goal 1 (SLP) (P)  demonstrating orientation to day;demonstrating orientation to month;demonstrating orientation to place;demonstrating orientation to year;with minimal cues (75-90%)  -MH      Time Frame (Orientation Goal 1, SLP) (P)  short term goal (STG);by discharge  -MH      Barriers (Orientation Goal 1, SLP) (P)  fatigue  -MH      Progress/Outcomes (Orientation Goal 1, SLP) (P)  unable to  make needed progress  -         Organizational Skills Goal 1 (SLP)    Improve Thought Organization Through Goal 1 (SLP) (P)  completing a divergent naming task;completing a convergent naming task;naming similarities and differences;with minimal cues (75-90%)  -      Time Frame (Thought Organization Skills Goal 1, SLP) (P)  short term goal (STG);by discharge  -      Barriers (Thought Organization Skills Goal 1, SLP) (P)  fatigue  -      Progress (Thought Organization Skills Goal 1, SLP) (P)  0%  -      Progress/Outcomes (Thought Organization Skills Goal 1, SLP) (P)  unable to make needed progress  -         Reasoning Goal 1 (SLP)    Improve Reasoning Through Goal 1 (SLP) (P)  complete basic reasoning task;complete analogies;identify absurdities;with minimal cues (75-90%)  -      Time Frame (Reasoning Goal 1, SLP) (P)  short term goal (STG);by discharge  -      Barriers (Reasoning Goal 1, SLP) (P)  fatigue  -      Progress/Outcomes (Reasoning Goal 1, SLP) (P)  goal ongoing  -         Functional Problem Solving Skills Goal 1 (SLP)    Improve Problem Solving Through Goal 1 (SLP) (P)  determine solutions to simple ADL/safety problems;with minimal cues (75-90%)  -      Time Frame (Problem Solving Goal 1, SLP) (P)  short term goal (STG);by discharge  -      Barriers (Problem Solving Goal 1, SLP) (P)  fatigue  -      Progress/Outcomes (Problem Solving Goal 1, SLP) (P)  goal ongoing  -         Executive Functional Skills Goal 1 (SLP)    Improve Executive Function Skills Goal 1 (SLP) (P)  demonstrate awareness of deficit;organization/planning activity;with minimal cues (75-90%)  -      Time Frame (Executive Function Skills Goal 1, SLP) (P)  short term goal (STG);by discharge  -      Barriers (Executive Function Skills Goal 1, SLP) (P)  fatigue  -      Progress/Outcomes (Executive Function Skills Goal 1, SLP) (P)  goal ongoing  -        User Key  (r) = Recorded By, (t) = Taken By, (c) =  "Cosigned By    Initials Name Provider Type     Susanne Miller Speech Therapy Student Speech Therapy Student          EDUCATION  The patient has been educated in the following areas:   Cognitive Impairment Dysphagia (Swallowing Impairment).    SLP Recommendation and Plan                       Anticipated Dischage Disposition: (P) skilled nursing facility                Plan of Care Reviewed With: (P) patient, daughter  Plan of Care Review  Plan of Care Reviewed With: (P) patient, daughter  Daily Summary of Progress (SLP): (P) progress toward functional goals is gradual  Plan for Continued Treatment (SLP): (P) Plan to follow pt until discharge   Outcome Summary: (P) Pt received 3 trials of thin and 1 trial of regular solid. Pt did not demonstrate any s/s of aspiration. Pt was extremely fatigued. SLP attempted cognitive tx today. Pt was uncooperative when asked to answer orientation questions. Pt continued to state, \" I just dont care,\" or \"I just don't want to.\" Pt did state her name, but was unwilling to answer the day, month, year, and place. When asked to complete a divergent naming task, pt remained unwilling to participate. Pt's daughter became engaged in the activity and pt continued to be uncooperative. SLP will continue to follow and treat pt until discharge.            Time Calculation:         Time Calculation- SLP     Row Name 06/11/18 0956             Time Calculation- [facility]    SLP Start Time (P)  0917  -      SLP Stop Time (P)  0941  -      SLP Time Calculation (min) (P)  24 min  -      SLP Received On (P)  06/11/18  -        User Key  (r) = Recorded By, (t) = Taken By, (c) = Cosigned By    Initials Name Provider Type     Susanne Miller Speech Therapy Student Speech Therapy Student          Therapy Charges for Today     Code Description Service Date Service Provider Modifiers Qty    [id number] HC ST TREATMENT SWALLOW 1 6/11/2018 Susanne Miller, Speech Therapy Student GN, KX 1    [id number] "  ST TREATMENT SPEECH 1 6/11/2018 Susanne Miller, Speech Therapy Student GN, KX 1          SLP G-Codes  SLP NOMS Used?: Yes  Functional Limitations: (S) Other Speech Language Pathology (problem solving)  Swallow Current Status (): At least 20 percent but less than 40 percent impaired, limited or restricted  Swallow Goal Status (): At least 20 percent but less than 40 percent impaired, limited or restricted  Other Speech-Language Pathology Functional Limitation Current Status (): At least 60 percent but less than 80 percent impaired, limited or restricted  Other Speech-Language Pathology Functional Limitation Goal Status (): At least 60 percent but less than 80 percent impaired, limited or restricted  Other Speech-Language Pathology Functional Limitation Discharge Status (): At least 60 percent but less than 80 percent impaired, limited or restricted      Susanne Miller, Speech Therapy Student  6/11/2018

## 2018-06-12 VITALS
TEMPERATURE: 98.3 F | DIASTOLIC BLOOD PRESSURE: 66 MMHG | RESPIRATION RATE: 20 BRPM | HEIGHT: 66 IN | BODY MASS INDEX: 31.84 KG/M2 | SYSTOLIC BLOOD PRESSURE: 146 MMHG | WEIGHT: 198.1 LBS | OXYGEN SATURATION: 94 % | HEART RATE: 59 BPM

## 2018-06-12 LAB
ALBUMIN SERPL-MCNC: 3.4 G/DL (ref 3.5–5)
ALBUMIN/GLOB SERPL: 0.9 G/DL (ref 1.1–2.5)
ALP SERPL-CCNC: 204 U/L (ref 24–120)
ALT SERPL W P-5'-P-CCNC: 23 U/L (ref 0–54)
AMMONIA BLD-SCNC: 40 UMOL/L (ref 9–33)
ANION GAP SERPL CALCULATED.3IONS-SCNC: 11 MMOL/L (ref 4–13)
AST SERPL-CCNC: 154 U/L (ref 7–45)
BILIRUB SERPL-MCNC: 0.5 MG/DL (ref 0.1–1)
BUN BLD-MCNC: 10 MG/DL (ref 5–21)
BUN/CREAT SERPL: 12.3 (ref 7–25)
CALCIUM SPEC-SCNC: 12.1 MG/DL (ref 8.4–10.4)
CHLORIDE SERPL-SCNC: 113 MMOL/L (ref 98–110)
CO2 SERPL-SCNC: 20 MMOL/L (ref 24–31)
CREAT BLD-MCNC: 0.81 MG/DL (ref 0.5–1.4)
DEPRECATED RDW RBC AUTO: 55.3 FL (ref 40–54)
EOSINOPHIL # BLD MANUAL: 0.86 10*3/MM3 (ref 0–0.7)
EOSINOPHIL NFR BLD MANUAL: 3 % (ref 0–4)
ERYTHROCYTE [DISTWIDTH] IN BLOOD BY AUTOMATED COUNT: 18.2 % (ref 12–15)
GFR SERPL CREATININE-BSD FRML MDRD: 69 ML/MIN/1.73
GLOBULIN UR ELPH-MCNC: 3.8 GM/DL
GLUCOSE BLD-MCNC: 111 MG/DL (ref 70–100)
GLUCOSE BLDC GLUCOMTR-MCNC: 131 MG/DL (ref 70–130)
HCT VFR BLD AUTO: 39.9 % (ref 37–47)
HGB BLD-MCNC: 12.8 G/DL (ref 12–16)
LYMPHOCYTES # BLD MANUAL: 15.76 10*3/MM3 (ref 0.72–4.86)
LYMPHOCYTES NFR BLD MANUAL: 4 % (ref 4–12)
LYMPHOCYTES NFR BLD MANUAL: 55 % (ref 15–45)
MCH RBC QN AUTO: 28 PG (ref 28–32)
MCHC RBC AUTO-ENTMCNC: 32.1 G/DL (ref 33–36)
MCV RBC AUTO: 87.3 FL (ref 82–98)
MONOCYTES # BLD AUTO: 1.15 10*3/MM3 (ref 0.19–1.3)
NEUTROPHILS # BLD AUTO: 10.89 10*3/MM3 (ref 1.87–8.4)
NEUTROPHILS NFR BLD MANUAL: 38 % (ref 39–78)
NRBC BLD MANUAL-RTO: 0.1 /100 WBC (ref 0–0)
PLAT MORPH BLD: NORMAL
PLATELET # BLD AUTO: 411 10*3/MM3 (ref 130–400)
PMV BLD AUTO: 11.7 FL (ref 6–12)
POTASSIUM BLD-SCNC: 3.6 MMOL/L (ref 3.5–5.3)
PROT SERPL-MCNC: 7.2 G/DL (ref 6.3–8.7)
RBC # BLD AUTO: 4.57 10*6/MM3 (ref 4.2–5.4)
SODIUM BLD-SCNC: 144 MMOL/L (ref 135–145)
TARGETS BLD QL SMEAR: ABNORMAL
WBC MORPH BLD: NORMAL
WBC NRBC COR # BLD: 28.66 10*3/MM3 (ref 4.8–10.8)

## 2018-06-12 PROCEDURE — 85007 BL SMEAR W/DIFF WBC COUNT: CPT | Performed by: NURSE PRACTITIONER

## 2018-06-12 PROCEDURE — 82962 GLUCOSE BLOOD TEST: CPT

## 2018-06-12 PROCEDURE — 82140 ASSAY OF AMMONIA: CPT | Performed by: NURSE PRACTITIONER

## 2018-06-12 PROCEDURE — 25010000002 HEPARIN (PORCINE) PER 1000 UNITS: Performed by: FAMILY MEDICINE

## 2018-06-12 PROCEDURE — 63710000001 INSULIN DETEMIR PER 5 UNITS: Performed by: NURSE PRACTITIONER

## 2018-06-12 PROCEDURE — 92507 TX SP LANG VOICE COMM INDIV: CPT

## 2018-06-12 PROCEDURE — 92526 ORAL FUNCTION THERAPY: CPT

## 2018-06-12 PROCEDURE — 85025 COMPLETE CBC W/AUTO DIFF WBC: CPT | Performed by: NURSE PRACTITIONER

## 2018-06-12 PROCEDURE — 80053 COMPREHEN METABOLIC PANEL: CPT | Performed by: NURSE PRACTITIONER

## 2018-06-12 RX ORDER — FENTANYL 25 UG/H
1 PATCH TRANSDERMAL
Qty: 5 PATCH | Refills: 0 | Status: SHIPPED | OUTPATIENT
Start: 2018-06-15

## 2018-06-12 RX ORDER — TRAMADOL HYDROCHLORIDE 50 MG/1
25 TABLET ORAL EVERY 6 HOURS PRN
Qty: 12 TABLET | Refills: 0 | Status: SHIPPED | OUTPATIENT
Start: 2018-06-12 | End: 2018-06-16

## 2018-06-12 RX ORDER — FENTANYL 25 UG/H
1 PATCH TRANSDERMAL
Status: DISCONTINUED | OUTPATIENT
Start: 2018-06-12 | End: 2018-06-12 | Stop reason: HOSPADM

## 2018-06-12 RX ORDER — FAMOTIDINE 20 MG/1
20 TABLET, FILM COATED ORAL DAILY
Qty: 60 TABLET | Refills: 0
Start: 2018-06-13

## 2018-06-12 RX ORDER — MEGESTROL ACETATE 40 MG/1
40 TABLET ORAL 4 TIMES DAILY
Qty: 120 TABLET
Start: 2018-06-12

## 2018-06-12 RX ORDER — LACTULOSE 20 G/30ML
20 SOLUTION ORAL DAILY
Qty: 900 ML | Refills: 0 | Status: SHIPPED | OUTPATIENT
Start: 2018-06-13

## 2018-06-12 RX ORDER — TRAMADOL HYDROCHLORIDE 50 MG/1
25 TABLET ORAL EVERY 6 HOURS PRN
Qty: 12 TABLET | Status: CANCELLED | OUTPATIENT
Start: 2018-06-12 | End: 2018-06-16

## 2018-06-12 RX ADMIN — HEPARIN SODIUM 5000 UNITS: 5000 INJECTION INTRAVENOUS; SUBCUTANEOUS at 05:19

## 2018-06-12 RX ADMIN — Medication 1 SPRAY: at 05:24

## 2018-06-12 RX ADMIN — RIFAXIMIN 550 MG: 550 TABLET ORAL at 08:27

## 2018-06-12 RX ADMIN — LOSARTAN POTASSIUM 100 MG: 50 TABLET ORAL at 08:27

## 2018-06-12 RX ADMIN — Medication 500 MG: at 08:27

## 2018-06-12 RX ADMIN — AZTREONAM 1000 MG: 1 INJECTION, POWDER, LYOPHILIZED, FOR SOLUTION INTRAMUSCULAR; INTRAVENOUS at 05:19

## 2018-06-12 RX ADMIN — TRAMADOL HYDROCHLORIDE 25 MG: 50 TABLET, COATED ORAL at 14:28

## 2018-06-12 RX ADMIN — TRAMADOL HYDROCHLORIDE 25 MG: 50 TABLET, COATED ORAL at 09:03

## 2018-06-12 RX ADMIN — MEGESTROL ACETATE 40 MG: 40 TABLET ORAL at 11:55

## 2018-06-12 RX ADMIN — ESCITALOPRAM 20 MG: 10 TABLET, FILM COATED ORAL at 08:27

## 2018-06-12 RX ADMIN — FAMOTIDINE 20 MG: 20 TABLET, FILM COATED ORAL at 08:28

## 2018-06-12 RX ADMIN — MEMANTINE HYDROCHLORIDE 10 MG: 5 TABLET, FILM COATED ORAL at 08:26

## 2018-06-12 RX ADMIN — ATORVASTATIN CALCIUM 20 MG: 10 TABLET, FILM COATED ORAL at 08:26

## 2018-06-12 RX ADMIN — VANCOMYCIN HYDROCHLORIDE 125 MG: KIT at 08:28

## 2018-06-12 RX ADMIN — TRAMADOL HYDROCHLORIDE 25 MG: 50 TABLET, COATED ORAL at 02:58

## 2018-06-12 RX ADMIN — LACTULOSE 20 G: 20 SOLUTION ORAL at 08:27

## 2018-06-12 RX ADMIN — VANCOMYCIN HYDROCHLORIDE 125 MG: KIT at 02:52

## 2018-06-12 RX ADMIN — Medication 1 SPRAY: at 11:54

## 2018-06-12 RX ADMIN — AMLODIPINE BESYLATE 5 MG: 5 TABLET ORAL at 08:26

## 2018-06-12 RX ADMIN — INSULIN DETEMIR 10 UNITS: 100 INJECTION, SOLUTION SUBCUTANEOUS at 08:28

## 2018-06-12 RX ADMIN — FENTANYL 1 PATCH: 25 PATCH, EXTENDED RELEASE TRANSDERMAL at 08:28

## 2018-06-12 RX ADMIN — MEGESTROL ACETATE 40 MG: 40 TABLET ORAL at 08:26

## 2018-06-12 NOTE — CONSULTS
MEDICAL ONCOLOGY CONSULTATION    Pt Name: Bindu Fountain  MRN: [id number]  [id number]  YOB: 1945  Date of evaluation: 6/12/2018    Reason for Consultation:  Cholangiocarcinoma    Requesting Physician:  Hospitalist    History Obtained From:  FAMILY and CHART    HISTORY OF PRESENT ILLNESS:    Mrs. Fountain is a 73-year-old  female seen previously at [facility] 5/2018 with significant hepatomegaly with ultrasound-guided biopsy on 5/18/2018 consistent with diffuse cholangiocarcinoma.  Dr. Ramirez had spoken with family at that time and they were considering whether they wanted to consider systemic therapy versus supportive care only and potential hospice care.  She was readmitted to the hospital on 6/4/2018 with confusion and is being treated for Klebsiella pneumoniae UTI and pneumonia.  Family has requested further discussion regarding her malignancy.      HEMATOLOGY HISTORY:  Mrs. Fountain is a 72-year-old  female admitted to [facility] on 5/4/2018 with admitting CBC revealing a WBC of 26.49 with 52% lymphocytes, hgb 13.9, MCV 91, PLT of 444,000.  CT of the chest on 5/5/2018 revealed a hiatal hernia with distal esophageal inflammation.  Her CBC is continued to remain elevated with a WBC of 27.51with 58% lymphocytes.  During this stay she did show some atypical lymphocytes as well.  A CBC available from 2/27/2017 also revealed a WBC of 14.9with Hgb 12.3, ,000.  Hematology consultation request because of the persistent leukocytosis.        There wasn't any lymphadenopathy seen on the CT of the chest without contrast 5/5/2018.     CT of the abdomen and pelvis without contrast 5/6/2018 was unrevealing for any lymphadenopathy.  Prior splenectomy with small table regenerated splenule noted. Hepatomegaly 26 cm with diffuse fatty infiltration.  Right adrenal adenoma 3.1 cm - unchanged from 1/30/2018.  Moderate sized hiatal hernia with mild induration - possibly inflammation.     She does have a history of a  splenectomy 7 years ago at [facility] by Dr. Davies - (thrombosis - per family), which would account for a chronic leukocytosis.      Peripheral blood flow cytometry - No immunophenotypic evidence of abnormal myeloid maturation, acute leukemia, or a clonal B-cell population.  Relatively increased (21.3% of total) T-large granular lymphocytes are identified. ( CD3+/ CD57+.)       T-cell receptor (TCR) gamma gene rearrangement by PCR analysis 5/8/2018 to [facility] was POSITIVE for a clonal TCR gamma gene rearrangement.        TUMOR HISTORY  · CT chest without contrast at [facility] on 5/5/2018 revealed a 4 mm noncalcified lingula subpleural nodule - CT recommended for 6 month follow-up.     · CT of the abdomen and pelvis does not reveal evidence of obvious metastatic disease.  There is fatty infiltration of the liver and a right 3.1 cm bilobed adrenal adenoma that will need to be followed in the future.  It hasn't  changed compared to the previous CT scan from January 30th of this year.  · Bone scan 5/10/2018 - No obvious metastatic dz     SCREENING STUDIES  1.  Mammogram performed at  - getting report     2.  Colonoscopy - [facility] per Dr. Alejandre 2/20/2018 revealed a pedunculated lesion above the anal verge - pathology negative, and a polyp in the distal transverse (tubular adenoma)and left colon (tubulovillous adenoma).       CEA - 3.07  CA 15 3 - 63  CA 19 9 - 605   - 176.8  AFP  - 14.3     MRI of the abdomen W&WO contrast - unable to be completed due to movement     Ultrasound liver 5/6/2018 did reveal an ill-defined hypoechoic nodule in the left hepatic lobe measuring 1.7 x 1.3 cm.        S/p u/s guided liver biopsy 5/18/2018 - 2 cores obtained - low-grade adenocarcinoma, consistent with CHOLANGIOCARCINOMA       Past Medical History:    Past Medical History:   Diagnosis Date   • Cancer     liver   • Dementia    • Diabetes    • Dysphagia    • High cholesterol    •  Hypertension    • Laryngopharyngeal reflux    • Lingual tonsil hypertrophy    • Thyroid nodule        Past Surgical History:    Past Surgical History:   Procedure Laterality Date   • APPENDECTOMY     • CHOLECYSTECTOMY     • FINE NEEDLE ASPIRATION     • KNEE SURGERY     • SPLENECTOMY     • TONSILLECTOMY         Immunizations:      There is no immunization history on file for this patient.    Current Hospital Medications:      Current Facility-Administered Medications:   •  amLODIPine (NORVASC) tablet 5 mg, 5 mg, Oral, Q24H, Vinod Butler MD, 5 mg at 06/11/18 0951  •  atorvastatin (LIPITOR) tablet 20 mg, 20 mg, Oral, Daily, Vinod Butler MD, 20 mg at 06/11/18 0951  •  aztreonam (AZACTAM) 1 g/10mL IV PUSH syringe, 1 g, Intravenous, Q8H, JULI Beyer, 1,000 mg at 06/12/18 0519  •  donepezil (ARICEPT) tablet 10 mg, 10 mg, Oral, Nightly, Vinod Butler MD, 10 mg at 06/11/18 2033  •  dry mouth spray (BIOTENE) 1 spray, 1 spray, Mouth/Throat, 5x Daily, JULI Ingram, 1 spray at 06/12/18 0524  •  escitalopram (LEXAPRO) tablet 20 mg, 20 mg, Oral, Daily, Vinod Butler MD, 20 mg at 06/11/18 0951  •  famotidine (PEPCID) tablet 20 mg, 20 mg, Oral, Daily, Vinod Butler MD, 20 mg at 06/11/18 0952  •  furosemide (LASIX) tablet 20 mg, 20 mg, Oral, Every Other Day, JULI Malone, 20 mg at 06/11/18 0951  •  glucagon (human recombinant) (GLUCAGEN DIAGNOSTIC) injection 1 mg, 1 mg, Subcutaneous, PRN, JULI Malone  •  heparin (porcine) 5000 UNIT/ML injection 5,000 Units, 5,000 Units, Subcutaneous, Q12H, Vinod Butler MD, 5,000 Units at 06/12/18 0519  •  insulin detemir (LEVEMIR) injection 10 Units, 10 Units, Subcutaneous, Q12H, JULI Malone, 10 Units at 06/11/18 2041  •  insulin lispro (humaLOG) injection 2-7 Units, 2-7 Units, Subcutaneous, 4x Daily With Meals & Nightly, JULI Malone, 2 Units at 06/09/18 1714  •  ipratropium-albuterol (DUO-NEB)  nebulizer solution 3 mL, 3 mL, Nebulization, Q6H PRN, Ara K Woeltz, APRN  •  lactulose solution 20 g, 20 g, Oral, Daily, Ara K Woeltz, APRN, 20 g at 06/11/18 0952  •  losartan (COZAAR) tablet 100 mg, 100 mg, Oral, Daily, Amanda Lewis, APRN, 100 mg at 06/11/18 0951  •  megestrol (MEGACE) tablet 40 mg, 40 mg, Oral, 4x Daily, Amanda Lewis, APRN, 40 mg at 06/11/18 2034  •  memantine (NAMENDA) tablet 10 mg, 10 mg, Oral, Q12H, Vinod Butler MD, 10 mg at 06/11/18 2034  •  ondansetron (ZOFRAN) injection 4 mg, 4 mg, Intravenous, Q6H PRN, Vinod Butler MD  •  rifaximin (XIFAXAN) tablet 550 mg, 550 mg, Oral, Q12H, Amanda Lewis, APRN, 550 mg at 06/11/18 2034  •  saccharomyces boulardii (FLORASTOR) capsule 500 mg, 500 mg, Oral, BID, Ara K Woeltz, APRN, 500 mg at 06/11/18 2034  •  sodium chloride 0.9 % flush 1-10 mL, 1-10 mL, Intravenous, PRN, Vinod Butler MD  •  Insert peripheral IV, , , Once **AND** sodium chloride 0.9 % flush 10 mL, 10 mL, Intravenous, PRN, Cali Kaminski MD  •  traMADol (ULTRAM) tablet 25 mg, 25 mg, Oral, Q6H PRN, Darian Garnica MD, 25 mg at 06/12/18 0258  •  vancomycin oral solution 125 mg, 125 mg, Oral, Q6H, Ara K Woeltz, APRN, 125 mg at 06/12/18 0252    Allergies:   Allergies   Allergen Reactions   • Cephalexin Shortness Of Breath   • Oxycodone Other (See Comments)     Caused PT to jerk uncontrollably.     • Sulfa Antibiotics Shortness Of Breath   • Sulfamethoxazole-Trimethoprim Shortness Of Breath       Social History:    Social History     Social History   • Marital status:      Spouse name: N/A   • Number of children: N/A   • Years of education: N/A     Occupational History   • Not on file.     Social History Main Topics   • Smoking status: Former Smoker     Quit date: 2012   • Smokeless tobacco: Never Used      Comment: 2012   • Alcohol use No   • Drug use: Unknown   • Sexual activity: Defer     Other Topics Concern   •  "Not on file     Social History Narrative   • No narrative on file       Family History:   Family History   Problem Relation Age of Onset   • Cancer Mother    • Heart disease Mother      REVIEW OF SYSTEMS:    Constitutional: positive for fatigue   HEENT: no blurring of vision, no double vision                       Lungs positive for pneumonia  CVS: no palpitation, no chest pain  GI: no abdominal pain, no nausea , no vomiting, no constipation; also before cholangiocarcinoma  KRISTOPHER: Positive for Klebsiella pneumoniae UTI  Musculoskeletal: generalized pain and aches  Endocrine: positive for diabetes  Hematology: positive for leukocytosis  Dermatology: no skin rash, no eczema, no pruritis  Psychiatry: no suicide ideation  Neurology: positive for memory issues, and confusion this admission which is somewhat better    Vitals:    /54 (BP Location: Right arm, Patient Position: Lying)   Pulse 60   Temp 98.1 °F (36.7 °C) (Oral)   Resp 20   Ht 167.6 cm (65.98\")   Wt 89.9 kg (198 lb 1.6 oz)   SpO2 93%   BMI 31.99 kg/m²     PHYSICAL EXAM:    CONSTITUTIONAL: NAD  EYES: Non icteric  ENT: Mucous membranes moist, no oral pharyngeal lesions   NECK: Supple, no masses   CHEST/LUNGS: CTA bilaterally, normal respiratory effort   CARDIOVASCULAR: RRR, no murmurs  ABDOMEN: soft non-tender, active bowel sounds, hepatomegaly almost down to pelvic brim - mildly tender  EXTREMITIES: warm, moves all fours  SKIN: warm, dry with no rashes or lesions  LYMPH: No cervical, clavicular, axillary, or inguinal lymphadenopathy  NEUROLOGIC: Non focal   PSYCH: mood and affect appropriate    CBC    Results from last 7 days  Lab Units 06/10/18  0615 06/08/18  0551 06/07/18  0548   WBC 10*3/mm3 31.62* 29.55* 26.35*   HEMOGLOBIN g/dL 12.8 13.0 12.2   HEMATOCRIT % 40.5 41.1 38.1   PLATELETS 10*3/mm3 559* 554* 561*         Lab Results   Component Value Date     06/11/2018    K 3.5 06/11/2018     (H) 06/11/2018    CO2 19.0 (L) 06/11/2018    " BUN 11 06/11/2018    CREATININE 0.91 06/11/2018    GLUCOSE 149 (H) 06/11/2018    CALCIUM 11.5 (H) 06/11/2018    BILITOT 0.5 06/11/2018    ALKPHOS 184 (H) 06/11/2018     (H) 06/11/2018    ALT 22 06/11/2018    GLOB 3.7 06/11/2018       Lab Results   Component Value Date    INR 1.09 06/04/2018    INR 1.13 (H) 05/18/2018    INR 1.06 05/17/2018    PROTIME 14.5 06/04/2018    PROTIME 14.9 (H) 05/18/2018    PROTIME 14.1 05/17/2018       Cultures:    Lab Results   Component Value Date    BLOODCX No growth at 5 days 06/06/2018     No components found for: URINCX    ASSESSMENT/PLAN:  Mrs. Fountain is a 73-year-old  female [facility] with significant hepatomegaly with ultrasound-guided biopsy on 5/18/2018 consistent with diffuse cholangiocarcinoma.  Dr. Ramirez had spoken with family at that time and they were considering whether they wanted to consider systemic therapy versus supportive care only and potential hospice care.  She was readmitted to the hospital on 6/4/2018 with confusion and is being treated for Klebsiella pneumoniae UTI and pneumonia.  Family has requested further discussion regarding her malignancy.    1.  Hepatomegaly with diffuse cholangiocarcinoma    Dr. Ramirez did speak with Bindu's daughter-Sienna again at the bedside regarding potential chemotherapy versus supportive care potential hospice.  After a lengthy discussion Sienna has desired to proceed with hospice at this time.  Hospice will be consulted.  Anticipation is for her to be discharged back to [facility] in [place] with hospice support.  We will assist working on pain control.      MARY Samano    06/12/18  6:50 AM     I personally saw and examined this patient 6/12/2018, performing a face-to-face diagnostic evaluation with plan of care reviewed and developed with  Jose Antonio Miguel PA-C and nursing staff.   I have addended and/or modified the above history of present illness, physical examination, and assessment  and plan to reflect my findings and impressions.   Essential elements of the care plan were discussed with  Jose Antonio Miguel PA-C .   Agree with findings and assessment/plan as documented above.   Questions were encouraged, asked and answered to their understanding and satisfaction.    Olivier Ramirez MD  6/13/2018 7:44 AM

## 2018-06-12 NOTE — THERAPY TREATMENT NOTE
"Acute Care - Speech Language Pathology Treatment Note  [facility]     Patient Name: Bindu Fountain  : 1945  MRN: [id number]  Today's Date: 2018         Admit Date: 2018  Pt received 3 trials of thin liquid in tx today. Pt did not demonstrate any s/s of aspiration with the trials presented. SLP attempted to do cognitive-linguistic tasks such as yes/no and same/different. Pt was unwilling to participate in answering the questions. Pt stated \"just forget it, I have no idea.\" SLP also asked orientation questions. Pt was oriented to her name; however pt stated \"I don't care,\" or \" I don't know\" when asked the day, month, year, and place. It is recomended that pt remain on current diet. SLP will continue to follow pt until discharge.  Susanne Miller, Speech Therapy Student 2018 12:29 PM  Visit Dx:      ICD-10-CM ICD-9-CM   1. Urinary tract infection without hematuria, site unspecified N39.0 599.0   2. Pneumonia due to infectious organism, unspecified laterality, unspecified part of lung J18.9 136.9     484.8   3. Altered mental status, unspecified altered mental status type R41.82 780.97   4. Clostridium difficile infection B96.89 041.84   5. Oral phase dysphagia R13.11 787.21   6. Decreased activities of daily living (ADL) Z78.9 V49.89   7. Impaired cognition R41.89 294.9   8. Impaired mobility and endurance Z74.09 V49.89     Patient Active Problem List   Diagnosis   • Thrombocytosis after splenectomy   • Urinary tract infection without hematuria   • Chronic cystitis   • Renal cyst   • Pneumonia symptoms        Therapy Treatment    Therapy Treatment / Health Promotion    Treatment Time/Intention  Discipline: (P) speech language pathologist (18 1047 : Susanne Miller, Speech Therapy Student)  Document Type: (P) therapy note (daily note) (18 1047 : Susanne Miller, Speech Therapy Student)  Subjective Information: (P) complains of, fatigue (18 1047 : Karel Shea Therapy " Student)  Mode of Treatment: (P) speech-language pathology (06/12/18 1047 : Karel Shea Therapy Student)  Patient/Family Observations: (P) daughter in room (06/12/18 1047 : Karel Shea Therapy Student)  Patient Effort: (P) poor (06/12/18 1047 : Karel Shea Therapy Student)  Plan of Care Review  Plan of Care Reviewed With: (P) patient, daughter (06/12/18 1121 : Karel Shea Student)    Vitals/Pain/Safety  Pain Assessment  Additional Documentation: (P) Pain Scale: FACES Pre/Post-Treatment (Group) (06/12/18 1047 : Karel Shea Student)  Pain Scale: FACES Pre/Post-Treatment  Pain: FACES Scale, Pretreatment: (P) 0-->no hurt (06/12/18 1047 : Karel Shea Student)  Pain: FACES Scale, Post-Treatment: (P) 0-->no hurt (06/12/18 1047 : Karel Shea Student)    Cognition, Communication, Swallow  Recommendations  Anticipated Dischage Disposition: (P) skilled nursing facility (06/12/18 1047 : Karel Shea Therapy Student)    Outcome Summary  Outcome Summary/Treatment Plan (SLP)  Daily Summary of Progress (SLP): (P) progress toward functional goals is gradual (06/12/18 1047 : Karel Shea Student)  Barriers to Overall Progress (SLP): (P) fatigue, uncooperative  (06/12/18 1047 : Karel Shea Therapy Student)  Plan for Continued Treatment (SLP): (P) Plan to follow pt until discharge (06/12/18 1047 : Karel Shea Therapy Student)  Anticipated Dischage Disposition: (P) skilled nursing facility (06/12/18 1047 : Karel Shea Therapy Student)      EDUCATION  The patient has been educated in the following areas:   Cognitive Impairment Dysphagia (Swallowing Impairment).    SLP Recommendation and Plan  SLP Diagnosis: Cognitive-linguistic deficit   Rehab Potential/Prognosis: fair     Anticipated Dischage Disposition: (P) skilled nursing facility        Predicted Duration Therapy  "Intervention (Days): until discharge       Plan of Care Reviewed With: (P) patient, daughter  Plan of Care Review  Plan of Care Reviewed With: (P) patient, daughter  Daily Summary of Progress (SLP): (P) progress toward functional goals is gradual  Plan for Continued Treatment (SLP): (P) Plan to follow pt until discharge  Outcome Summary: (P) Pt received 3 trials of thin liquid in tx today. Pt did not demonstrate any s/s of aspiration with the trials presented. SLP attempted to do cognitive-linguistic tasks such as yes/no and same/different. Pt was unwilling to participate in answering the questions. Pt stated \"just forget it, I have no idea.\" SLP also asked orientation questions. Pt was oriented to her name; however pt stated \"I don't care,\" or \" I don't know\" when asked the day, month, year, and place. It is recomended that pt remain on current diet. SLP will continue to follow pt until discharge.           SLP GOALS     Row Name 06/12/18 1047 06/11/18 0917          Oral Nutrition/Hydration Goal 1 (SLP)    Oral Nutrition/Hydration Goal 1, SLP (P)  Pt will tolerate least restrictive diet with no overt s/s of aspiration.  - Pt will tolerate least restrictive diet with no overt s/s of aspiration.  -MB (r) MH (t) MB (c)     Time Frame (Oral Nutrition/Hydration Goal 1, SLP) (P)  by discharge  -MH by discharge  -MB (r) MH (t) MB (c)     Barriers (Oral Nutrition/Hydration Goal 1, SLP) (P)  n/a  -MH n/a  -MB (r) MH (t) MB (c)     Progress/Outcomes (Oral Nutrition/Hydration Goal 1, SLP) (P)  continuing progress toward goal  -MH continuing progress toward goal  -MB (r) MH (t) MB (c)        Words/Phrases/Sentences Goal 1 (SLP)    Improve Ability to Comprehend Words/Phrases/Sentences Through: Goal 1 (SLP) (P)  identify body part;independently (over 90% accuracy)  - identify body part;independently (over 90% accuracy)  -MB (r) MH (t) MB (c)     Time Frame (Identify Objects and Pictures Goal 1, SLP) (P)  short term goal " (STG);by discharge  - short term goal (STG);by discharge  -MB (r) MH (t) MB (c)     Barriers (Identify Objects and Pictures Goal 1, SLP) (P)  fatigue  - fatigue  -MB (r) MH (t) MB (c)     Progress/Outcomes (Identify Objects and Pictures Goal 1, SLP) (P)  goal ongoing  -[facility] ongoing  -MB (r) MH (t) MB (c)        Comprehend Questions Goal 1 (SLP)    Improve Ability to Comprehend Questions Goal 1 (SLP) (P)  simple yes/no questions;independently (over 90% accuracy)  - simple yes/no questions;independently (over 90% accuracy)  -MB (r) MH (t) MB (c)     Time Frame (Comprehend Questions Goal 1, SLP) (P)  short term goal (STG);by discharge  - short term goal (STG);by discharge  -MB (r) MH (t) MB (c)     Barriers (Comprehend Questions Goal 1, SLP) (P)  fatigue  - fatigue  -MB (r) MH (t) MB (c)     Progress/Outcomes (Comprehend Questions Goal 1, SLP) (P)  goal ongoing  -[facility] ongoing  -MB (r) MH (t) MB (c)        Follow Directions Goal 2 (SLP)    Improve Ability to Follow Directions Goal 1 (SLP) (P)  2 step commands;independently (over 90% accuracy)  - 2 step commands;independently (over 90% accuracy)  -MB (r) MH (t) MB (c)     Time Frame (Follow Directions Goal 1, SLP) (P)  short term goal (STG);by discharge  - short term goal (STG);by discharge  -MB (r) MH (t) MB (c)     Barriers (Improve Ability to Follow Directions Goal 1, SLP) (P)  fatigue   - fatigue   -MB (r) MH (t) MB (c)     Progress/Outcomes (Follow Directions Goal 1, SLP) (P)  goal ongoing  - goal ongoing  -MB (r) MH (t) MB (c)        Word Retrieval Skills Goal 1 (SLP)    Improve Word Retrieval Skills By Goal 1 (SLP) (P)  completing automatic speech task, days of the week;completing automatic speech task, months;repeating words  - completing automatic speech task, days of the week;completing automatic speech task, months;repeating words  -MB (r) MH (t) MB (c)     Time Frame (Word Retrieval Goal 1, SLP) (P)  short term goal (STG);by  discharge  - short term goal (STG);by discharge  -MB (r) MH (t) MB (c)     Barriers (Word Retrieval Goal 1, SLP) (P)  fatigue   -MH fatigue   -MB (r) MH (t) MB (c)     Progress (Word Retrieval Skills Goal 1, SLP)  -- with minimal cues (75-90%)  -MB (r) MH (t) MB (c)     Progress/Outcomes (Word Retrieval Goal 1, SLP) (P)  goal ongoing  - goal ongoing  -MB (r) MH (t) MB (c)        Ability to Construct Phrase and Sentence Level Response Goal 1 (SLP)    Improve Ability to Construct Phrase and Sentence Level Responses By Goal 1 (SLP) (P)  constructing a sentence with a key word;with minimal cues (75-90%)  -MH constructing a sentence with a key word;with minimal cues (75-90%)  -MB (r) MH (t) MB (c)     Time Frame (Phrase and Sentence Level Response Goal 1, SLP) (P)  short term goal (STG);by discharge  - short term goal (STG);by discharge  -MB (r) MH (t) MB (c)     Barriers (Phrase and Sentence Level Response Goal 1, SLP) (P)  fatigue  - fatigue  -MB (r) MH (t) MB (c)     Progress (Construct Phrase and Sentence Level Response Goal 1, SLP)  -- with minimal cues (75-90%)  -MB (r) MH (t) MB (c)     Progress/Outcomes (Phrase and Sentence Level Response Goal 1, SLP) (P)  goal ongoing  - goal ongoing  -MB (r) MH (t) MB (c)        Orientation Goal 1 (SLP)    Improve Orientation Through Goal 1 (SLP) (P)  demonstrating orientation to day;demonstrating orientation to month;demonstrating orientation to place;demonstrating orientation to year;with minimal cues (75-90%)  -MH demonstrating orientation to day;demonstrating orientation to month;demonstrating orientation to place;demonstrating orientation to year;with minimal cues (75-90%)  -MB (r) MH (t) MB (c)     Time Frame (Orientation Goal 1, SLP) (P)  short term goal (STG);by discharge  - short term goal (STG);by discharge  -MB (r) MH (t) MB (c)     Barriers (Orientation Goal 1, SLP) (P)  fatigue  -MH fatigue  -MB (r) MH (t) MB (c)     Progress (Orientation Goal 1, SLP)  (P)  with 1:1 supervision/constant cues  -MH  --     Progress/Outcomes (Orientation Goal 1, SLP) (P)  unable to make needed progress  - unable to make needed progress  -MB (r) MH (t) MB (c)        Organizational Skills Goal 1 (SLP)    Improve Thought Organization Through Goal 1 (SLP) (P)  completing a divergent naming task;completing a convergent naming task;naming similarities and differences;with minimal cues (75-90%)  -MH completing a divergent naming task;completing a convergent naming task;naming similarities and differences;with minimal cues (75-90%)  -MB (r) MH (t) MB (c)     Time Frame (Thought Organization Skills Goal 1, SLP) (P)  short term goal (STG);by discharge  - short term goal (STG);by discharge  -MB (r) MH (t) MB (c)     Barriers (Thought Organization Skills Goal 1, SLP) (P)  fatigue  - fatigue  -MB (r) MH (t) MB (c)     Progress (Thought Organization Skills Goal 1, SLP)  -- 0%  -MB (r) MH (t) MB (c)     Progress/Outcomes (Thought Organization Skills Goal 1, SLP) (P)  goal ongoing  - unable to make needed progress  -MB (r) MH (t) MB (c)        Reasoning Goal 1 (SLP)    Improve Reasoning Through Goal 1 (SLP) (P)  complete basic reasoning task;complete analogies;identify absurdities;with minimal cues (75-90%)  -MH complete basic reasoning task;complete analogies;identify absurdities;with minimal cues (75-90%)  -MB (r) MH (t) MB (c)     Time Frame (Reasoning Goal 1, SLP) (P)  short term goal (STG);by discharge  - short term goal (STG);by discharge  -MB (r) MH (t) MB (c)     Barriers (Reasoning Goal 1, SLP) (P)  fatigue  - fatigue  -MB (r) MH (t) MB (c)     Progress/Outcomes (Reasoning Goal 1, SLP) (P)  goal ongoing  - goal ongoing  -MB (r) MH (t) MB (c)        Functional Problem Solving Skills Goal 1 (SLP)    Improve Problem Solving Through Goal 1 (SLP) (P)  determine solutions to simple ADL/safety problems;with minimal cues (75-90%)  - determine solutions to simple ADL/safety  problems;with minimal cues (75-90%)  -MB (r) MH (t) MB (c)     Time Frame (Problem Solving Goal 1, SLP) (P)  short term goal (STG);by discharge  - short term goal (STG);by discharge  -MB (r) MH (t) MB (c)     Barriers (Problem Solving Goal 1, SLP) (P)  fatigue  -MH fatigue  -MB (r) MH (t) MB (c)     Progress/Outcomes (Problem Solving Goal 1, SLP) (P)  goal ongoing  - goal ongoing  -MB (r) MH (t) MB (c)        Executive Functional Skills Goal 1 (SLP)    Improve Executive Function Skills Goal 1 (SLP) (P)  demonstrate awareness of deficit;organization/planning activity;with minimal cues (75-90%)  -MH demonstrate awareness of deficit;organization/planning activity;with minimal cues (75-90%)  -MB (r) MH (t) MB (c)     Time Frame (Executive Function Skills Goal 1, SLP) (P)  short term goal (STG);by discharge  - short term goal (STG);by discharge  -MB (r) MH (t) MB (c)     Barriers (Executive Function Skills Goal 1, SLP) (P)  fatigue  - fatigue  -MB (r) MH (t) MB (c)     Progress/Outcomes (Executive Function Skills Goal 1, SLP) (P)  goal ongoing  - goal ongoing  -MB (r) MH (t) MB (c)       User Key  (r) = Recorded By, (t) = Taken By, (c) = Cosigned By    Initials Name Provider Type    MERRY Sanders CCC-SLP Speech and Language Pathologist     Susanne Miller, Speech Therapy Student Speech Therapy Student                Time Calculation:           Time Calculation- SLP     Row Name 06/12/18 1227             Time Calculation- [facility]    SLP Start Time (P)  1047  -      SLP Stop Time (P)  1111  -      SLP Time Calculation (min) (P)  24 min  -      SLP Received On (P)  06/12/18  -        User Key  (r) = Recorded By, (t) = Taken By, (c) = Cosigned By    Initials Name Provider Type     Susanne Miller, Speech Therapy Student Speech Therapy Student          Therapy Charges for Today     Code Description Service Date Service Provider Modifiers Qty    04656984890 [facility] TREATMENT SWALLOW 1 6/11/2018 Susanne MADRIGAL  Paul, Speech Therapy Student TRISTAN, KX 1    [id number] [facility] ST TREATMENT SPEECH 1 2018 Susanne Miller Speech Therapy Student TRISTAN, KX 1    [id number] HC ST TREATMENT SWALLOW 1 2018 Susanne Miller Speech Therapy Student TRISTAN, KX 1    [id number] HC ST TREATMENT SPEECH 1 2018 Susanne MADRIGAL Paul Speech Therapy Student TRISTAN, KX 1              SLP G-Codes  SLP NOMS Used?: Yes  Functional Limitations: (S) Other Speech Language Pathology (problem solving)  Swallow Current Status (): At least 20 percent but less than 40 percent impaired, limited or restricted  Swallow Goal Status (): At least 20 percent but less than 40 percent impaired, limited or restricted  Other Speech-Language Pathology Functional Limitation Current Status (): At least 60 percent but less than 80 percent impaired, limited or restricted  Other Speech-Language Pathology Functional Limitation Goal Status (): At least 60 percent but less than 80 percent impaired, limited or restricted  Other Speech-Language Pathology Functional Limitation Discharge Status (): At least 60 percent but less than 80 percent impaired, limited or restricted      Susanne Miller Speech Therapy Student  2018   and Acute Care - Speech Language Pathology   Swallow Treatment Note [facility]     Patient Name: Bindu Fountain  : 1945  MRN: [id number]  Today's Date: 2018  Onset of Illness/Injury or Date of Surgery: 18     Referring Physician: JULI Quevedo      Admit Date: 2018    Visit Dx:      ICD-10-CM ICD-9-CM   1. Urinary tract infection without hematuria, site unspecified N39.0 599.0   2. Pneumonia due to infectious organism, unspecified laterality, unspecified part of lung J18.9 136.9     484.8   3. Altered mental status, unspecified altered mental status type R41.82 780.97   4. Clostridium difficile infection B96.89 041.84   5. Oral phase dysphagia R13.11 787.21   6. Decreased activities of daily living (ADL) Z78.9  V49.89   7. Impaired cognition R41.89 294.9   8. Impaired mobility and endurance Z74.09 V49.89     Patient Active Problem List   Diagnosis   • Thrombocytosis after splenectomy   • Urinary tract infection without hematuria   • Chronic cystitis   • Renal cyst   • Pneumonia symptoms       Therapy Treatment    Therapy Treatment / Health Promotion    Treatment Time/Intention  Discipline: (P) speech language pathologist (06/12/18 1047 : Susanne Miller Speech Therapy Student)  Document Type: (P) therapy note (daily note) (06/12/18 1047 : Karel Shea Therapy Student)  Subjective Information: (P) complains of, fatigue (06/12/18 1047 : Susanne Miller Speech Therapy Student)  Mode of Treatment: (P) speech-language pathology (06/12/18 1047 : Karel Shea Therapy Student)  Patient/Family Observations: (P) daughter in room (06/12/18 1047 : Susanne Miller Speech Therapy Student)  Patient Effort: (P) poor (06/12/18 1047 : Karel Shea Therapy Student)  Plan of Care Review  Plan of Care Reviewed With: (P) patient, daughter (06/12/18 1121 : Karel Shea Therapy Student)    Vitals/Pain/Safety  Pain Assessment  Additional Documentation: (P) Pain Scale: FACES Pre/Post-Treatment (Group) (06/12/18 1047 : Karel Shea Therapy Student)  Pain Scale: FACES Pre/Post-Treatment  Pain: FACES Scale, Pretreatment: (P) 0-->no hurt (06/12/18 1047 : Karel Shea Therapy Student)  Pain: FACES Scale, Post-Treatment: (P) 0-->no hurt (06/12/18 1047 : Susanne Miller Speech Therapy Student)    Cognition, Communication, Swallow  Recommendations  Anticipated Dischage Disposition: (P) skilled nursing facility (06/12/18 1047 : Karel Shea Therapy Student)    Outcome Summary  Outcome Summary/Treatment Plan (SLP)  Daily Summary of Progress (SLP): (P) progress toward functional goals is gradual (06/12/18 1047 : Karel Shea Therapy Student)  Barriers to Overall Progress (SLP):  (P) fatigue, uncooperative  (06/12/18 1047 : Susanne Miller, Speech Therapy Student)  Plan for Continued Treatment (SLP): (P) Plan to follow pt until discharge (06/12/18 1047 : Susanne Miller, Speech Therapy Student)  Anticipated Dischage Disposition: (P) skilled nursing facility (06/12/18 1047 : Susanne Miller, Speech Therapy Student)            SLP GOALS     Row Name 06/12/18 1047 06/11/18 0917          Oral Nutrition/Hydration Goal 1 (SLP)    Oral Nutrition/Hydration Goal 1, SLP (P)  Pt will tolerate least restrictive diet with no overt s/s of aspiration.  -MH Pt will tolerate least restrictive diet with no overt s/s of aspiration.  -MB (r) MH (t) MB (c)     Time Frame (Oral Nutrition/Hydration Goal 1, SLP) (P)  by discharge  -MH by discharge  -MB (r) TREVON (t) MB (c)     Barriers (Oral Nutrition/Hydration Goal 1, SLP) (P)  n/a  - n/a  -MB (r) MH (t) MB (c)     Progress/Outcomes (Oral Nutrition/Hydration Goal 1, SLP) (P)  continuing progress toward goal  -MH continuing progress toward goal  -MB (r) MH (t) MB (c)        Words/Phrases/Sentences Goal 1 (SLP)    Improve Ability to Comprehend Words/Phrases/Sentences Through: Goal 1 (SLP) (P)  identify body part;independently (over 90% accuracy)  - identify body part;independently (over 90% accuracy)  -MB (r) MH (t) MB (c)     Time Frame (Identify Objects and Pictures Goal 1, SLP) (P)  short term goal (STG);by discharge  - short term goal (STG);by discharge  -MB (r) MH (t) MB (c)     Barriers (Identify Objects and Pictures Goal 1, SLP) (P)  fatigue  - fatigue  -MB (r) MH (t) MB (c)     Progress/Outcomes (Identify Objects and Pictures Goal 1, SLP) (P)  goal ongoing  - goal ongoing  -MB (r) MH (t) MB (c)        Comprehend Questions Goal 1 (SLP)    Improve Ability to Comprehend Questions Goal 1 (SLP) (P)  simple yes/no questions;independently (over 90% accuracy)  - simple yes/no questions;independently (over 90% accuracy)  -MB (r) TREVON (t) MB (c)     Time Frame  (Comprehend Questions Goal 1, SLP) (P)  short term goal (STG);by discharge  - short term goal (STG);by discharge  -MB (r) MH (t) MB (c)     Barriers (Comprehend Questions Goal 1, SLP) (P)  fatigue  - fatigue  -MB (r) MH (t) MB (c)     Progress/Outcomes (Comprehend Questions Goal 1, SLP) (P)  goal ongoing  - goal ongoing  -MB (r) MH (t) MB (c)        Follow Directions Goal 2 (SLP)    Improve Ability to Follow Directions Goal 1 (SLP) (P)  2 step commands;independently (over 90% accuracy)  -MH 2 step commands;independently (over 90% accuracy)  -MB (r) MH (t) MB (c)     Time Frame (Follow Directions Goal 1, SLP) (P)  short term goal (STG);by discharge  - short term goal (STG);by discharge  -MB (r) MH (t) MB (c)     Barriers (Improve Ability to Follow Directions Goal 1, SLP) (P)  fatigue   - fatigue   -MB (r) MH (t) MB (c)     Progress/Outcomes (Follow Directions Goal 1, SLP) (P)  goal ongoing  - goal ongoing  -MB (r) MH (t) MB (c)        Word Retrieval Skills Goal 1 (SLP)    Improve Word Retrieval Skills By Goal 1 (SLP) (P)  completing automatic speech task, days of the week;completing automatic speech task, months;repeating words  - completing automatic speech task, days of the week;completing automatic speech task, months;repeating words  -MB (r) MH (t) MB (c)     Time Frame (Word Retrieval Goal 1, SLP) (P)  short term goal (STG);by discharge  - short term goal (STG);by discharge  -MB (r) MH (t) MB (c)     Barriers (Word Retrieval Goal 1, SLP) (P)  fatigue   - fatigue   -MB (r) MH (t) MB (c)     Progress (Word Retrieval Skills Goal 1, SLP)  -- with minimal cues (75-90%)  -MB (r) MH (t) MB (c)     Progress/Outcomes (Word Retrieval Goal 1, SLP) (P)  goal ongoing  - goal ongoing  -MB (r) MH (t) MB (c)        Ability to Construct Phrase and Sentence Level Response Goal 1 (SLP)    Improve Ability to Construct Phrase and Sentence Level Responses By Goal 1 (SLP) (P)  constructing a sentence with a key  word;with minimal cues (75-90%)  -MH constructing a sentence with a key word;with minimal cues (75-90%)  -MB (r) MH (t) MB (c)     Time Frame (Phrase and Sentence Level Response Goal 1, SLP) (P)  short term goal (STG);by discharge  -MH short term goal (STG);by discharge  -MB (r) MH (t) MB (c)     Barriers (Phrase and Sentence Level Response Goal 1, SLP) (P)  fatigue  -MH fatigue  -MB (r) MH (t) MB (c)     Progress (Construct Phrase and Sentence Level Response Goal 1, SLP)  -- with minimal cues (75-90%)  -MB (r) MH (t) MB (c)     Progress/Outcomes (Phrase and Sentence Level Response Goal 1, SLP) (P)  goal ongoing  -MH goal ongoing  -MB (r) MH (t) MB (c)        Orientation Goal 1 (SLP)    Improve Orientation Through Goal 1 (SLP) (P)  demonstrating orientation to day;demonstrating orientation to month;demonstrating orientation to place;demonstrating orientation to year;with minimal cues (75-90%)  -MH demonstrating orientation to day;demonstrating orientation to month;demonstrating orientation to place;demonstrating orientation to year;with minimal cues (75-90%)  -MB (r) MH (t) MB (c)     Time Frame (Orientation Goal 1, SLP) (P)  short term goal (STG);by discharge  -MH short term goal (STG);by discharge  -MB (r) MH (t) MB (c)     Barriers (Orientation Goal 1, SLP) (P)  fatigue  - fatigue  -MB (r) MH (t) MB (c)     Progress (Orientation Goal 1, SLP) (P)  with 1:1 supervision/constant cues  -  --     Progress/Outcomes (Orientation Goal 1, SLP) (P)  unable to make needed progress  -MH unable to make needed progress  -MB (r) MH (t) MB (c)        Organizational Skills Goal 1 (SLP)    Improve Thought Organization Through Goal 1 (SLP) (P)  completing a divergent naming task;completing a convergent naming task;naming similarities and differences;with minimal cues (75-90%)  -MH completing a divergent naming task;completing a convergent naming task;naming similarities and differences;with minimal cues (75-90%)  -MB (r) MH (t)  MB (c)     Time Frame (Thought Organization Skills Goal 1, SLP) (P)  short term goal (STG);by discharge  - short term goal (STG);by discharge  -MB (r) MH (t) MB (c)     Barriers (Thought Organization Skills Goal 1, SLP) (P)  fatigue  - fatigue  -MB (r) MH (t) MB (c)     Progress (Thought Organization Skills Goal 1, SLP)  -- 0%  -MB (r) MH (t) MB (c)     Progress/Outcomes (Thought Organization Skills Goal 1, SLP) (P)  goal ongoing  - unable to make needed progress  -MB (r) MH (t) MB (c)        Reasoning Goal 1 (SLP)    Improve Reasoning Through Goal 1 (SLP) (P)  complete basic reasoning task;complete analogies;identify absurdities;with minimal cues (75-90%)  - complete basic reasoning task;complete analogies;identify absurdities;with minimal cues (75-90%)  -MB (r) MH (t) MB (c)     Time Frame (Reasoning Goal 1, SLP) (P)  short term goal (STG);by discharge  - short term goal (STG);by discharge  -MB (r) MH (t) MB (c)     Barriers (Reasoning Goal 1, SLP) (P)  fatigue  - fatigue  -MB (r) MH (t) MB (c)     Progress/Outcomes (Reasoning Goal 1, SLP) (P)  goal ongoing  - goal ongoing  -MB (r) MH (t) MB (c)        Functional Problem Solving Skills Goal 1 (SLP)    Improve Problem Solving Through Goal 1 (SLP) (P)  determine solutions to simple ADL/safety problems;with minimal cues (75-90%)  -MH determine solutions to simple ADL/safety problems;with minimal cues (75-90%)  -MB (r) MH (t) MB (c)     Time Frame (Problem Solving Goal 1, SLP) (P)  short term goal (STG);by discharge  - short term goal (STG);by discharge  -MB (r) MH (t) MB (c)     Barriers (Problem Solving Goal 1, SLP) (P)  fatigue  - fatigue  -MB (r) MH (t) MB (c)     Progress/Outcomes (Problem Solving Goal 1, SLP) (P)  goal ongoing  - goal ongoing  -MB (r) MH (t) MB (c)        Executive Functional Skills Goal 1 (SLP)    Improve Executive Function Skills Goal 1 (SLP) (P)  demonstrate awareness of deficit;organization/planning activity;with minimal  "cues (75-90%)  -MH demonstrate awareness of deficit;organization/planning activity;with minimal cues (75-90%)  -MB (r) MH (t) MB (c)     Time Frame (Executive Function Skills Goal 1, SLP) (P)  short term goal (STG);by discharge  - short term goal (STG);by discharge  -MB (r) MH (t) MB (c)     Barriers (Executive Function Skills Goal 1, SLP) (P)  fatigue  - fatigue  -MB (r) MH (t) MB (c)     Progress/Outcomes (Executive Function Skills Goal 1, SLP) (P)  goal ongoing  - goal ongoing  -MB (r) MH (t) MB (c)       User Key  (r) = Recorded By, (t) = Taken By, (c) = Cosigned By    Initials Name Provider Type    MERRY Sanders, CCC-SLP Speech and Language Pathologist     Susanne Miller, Speech Therapy Student Speech Therapy Student          EDUCATION  The patient has been educated in the following areas:   Cognitive Impairment Dysphagia (Swallowing Impairment).    SLP Recommendation and Plan                       Anticipated Dischage Disposition: (P) skilled nursing facility                Plan of Care Reviewed With: (P) patient, daughter  Plan of Care Review  Plan of Care Reviewed With: (P) patient, daughter  Daily Summary of Progress (SLP): (P) progress toward functional goals is gradual  Plan for Continued Treatment (SLP): (P) Plan to follow pt until discharge  Outcome Summary: (P) Pt received 3 trials of thin liquid in tx today. Pt did not demonstrate any s/s of aspiration with the trials presented. SLP attempted to do cognitive-linguistic tasks such as yes/no and same/different. Pt was unwilling to participate in answering the questions. Pt stated \"just forget it, I have no idea.\" SLP also asked orientation questions. Pt was oriented to her name; however pt stated \"I don't care,\" or \" I don't know\" when asked the day, month, year, and place. It is recomended that pt remain on current diet. SLP will continue to follow pt until discharge.            Time Calculation:         Time Calculation- SLP     Row " Name 06/12/18 1227             Time Calculation- SLP    SLP Start Time (P)  1047  -      SLP Stop Time (P)  1111  -      SLP Time Calculation (min) (P)  24 min  -      SLP Received On (P)  06/12/18  -        User Key  (r) = Recorded By, (t) = Taken By, (c) = Cosigned By    Initials Name Provider Type     Susanne Miller, Speech Therapy Student Speech Therapy Student          Therapy Charges for Today     Code Description Service Date Service Provider Modifiers Qty    36160396460 HC ST TREATMENT SWALLOW 1 6/11/2018 Susanne Miller Speech Therapy Student GN, KX 1    [id number] HC ST TREATMENT SPEECH 1 6/11/2018 Susanne Miller Speech Therapy Student GN, KX 1    [id number] HC ST TREATMENT SWALLOW 1 6/12/2018 Susanne Miller Speech Therapy Student GN, KX 1    [id number] HC ST TREATMENT SPEECH 1 6/12/2018 Susanne Miller Speech Therapy Student GN, KX 1          SLP G-Codes  SLP NOMS Used?: Yes  Functional Limitations: (S) Other Speech Language Pathology (problem solving)  Swallow Current Status (): At least 20 percent but less than 40 percent impaired, limited or restricted  Swallow Goal Status (): At least 20 percent but less than 40 percent impaired, limited or restricted  Other Speech-Language Pathology Functional Limitation Current Status (): At least 60 percent but less than 80 percent impaired, limited or restricted  Other Speech-Language Pathology Functional Limitation Goal Status (): At least 60 percent but less than 80 percent impaired, limited or restricted  Other Speech-Language Pathology Functional Limitation Discharge Status (): At least 60 percent but less than 80 percent impaired, limited or restricted      Susanne Miller Speech Therapy Student  6/12/2018

## 2018-06-12 NOTE — PLAN OF CARE
Problem: Patient Care Overview  Goal: Plan of Care Review  Outcome: Ongoing (interventions implemented as appropriate)   06/12/18 0312   Coping/Psychosocial   Plan of Care Reviewed With patient   Plan of Care Review   Progress no change   OTHER   Outcome Summary C/O generalized pain. Medicated with prn Ultram x 2. Contact precautions maintained r/t c-diff. IV abx. Confused to time and situation. HS accucheck 132. Tele - sinus. Buttocks excoriated from diarrhea. Incontinent of b/b. Family at bedside       Problem: Fall Risk (Adult)  Goal: Absence of Fall  Outcome: Ongoing (interventions implemented as appropriate)      Problem: Skin Injury Risk (Adult)  Goal: Skin Health and Integrity  Outcome: Ongoing (interventions implemented as appropriate)      Problem: Infection, Risk/Actual (Adult)  Goal: Infection Prevention/Resolution  Outcome: Ongoing (interventions implemented as appropriate)

## 2018-06-12 NOTE — DISCHARGE SUMMARY
[facility] Medicine Services  DISCHARGE SUMMARY       Date of Admission: 6/4/2018  Date of Discharge:  6/12/2018  Primary Care Physician: Magan Lopez MD    Presenting Problem/History of Present Illness:  Altered Mental Status    Final Discharge Diagnoses:  1. Left upper lobe patchy infiltrate concerning for pneumonia- healthcare associated. Repeat chest x-ray reveals resolution of the infiltrate  2. Urinary tract infection, urine culture with growth of Klebsiella pneumoniae  3. Acute metabolic encephalopathy- likely secondary to above  4. Acute kidney injury on chronic kidney disease- difficult to ascertain baseline kidney function.  Improved  5. Leukocytosis-chronic  6. Recent diagnosis of cholangicarcinoma  7. Clostridium Difficile colitis with recurrence  8. Dementia  9. Insulin dependent Diabetes Mellitus Type 2, hgb A1c 6.8%  10. Asplenic  11. Thrombocytosis  12. Elevated Ammonia level-improved  13. Hypercalcemia-Post zometa 6/9-persistent hypercalcemia    Consults:   1. Dr. Olivier Ramirez-hematology/oncology    Procedures Performed: None    Pertinent Test Results:   Imaging Results (last 7 days)     Procedure Component Value Units Date/Time    XR Chest 1 View [[id number]] Collected:  06/06/18 0745     Updated:  06/06/18 0749    Narrative:       EXAMINATION:   XR CHEST 1 VW-  6/6/2018 7:45 AM CDT     HISTORY: Left upper lobe infiltrate     Frontal upright radiograph of the chest 6/6/2018 4:15 AM CDT     COMPARISON: June 4, 2018.     FINDINGS:   Previously described left upper lobe infiltrate is resolved.. Cardiac  silhouettes moderately enlarged with no pulmonary vascular congestion..      The osseous structures and surrounding soft tissues demonstrate no acute  abnormality.       Impression:       1. Moderate cardiomegaly no acute cardiopulmonary process.        2. Previous noted left upper lobe infiltrate is resolved.        This report was finalized on 06/06/2018 07:46  by Dr. Oleg Garvey MD.        Imaging Results (last 7 days)     Procedure Component Value Units Date/Time    XR Chest 1 View [[id number]] Collected:  06/06/18 0745     Updated:  06/06/18 0749    Narrative:       EXAMINATION:   XR CHEST 1 VW-  6/6/2018 7:45 AM CDT     HISTORY: Left upper lobe infiltrate     Frontal upright radiograph of the chest 6/6/2018 4:15 AM CDT     COMPARISON: June 4, 2018.     FINDINGS:   Previously described left upper lobe infiltrate is resolved.. Cardiac  silhouettes moderately enlarged with no pulmonary vascular congestion..      The osseous structures and surrounding soft tissues demonstrate no acute  abnormality.       Impression:       1. Moderate cardiomegaly no acute cardiopulmonary process.        2. Previous noted left upper lobe infiltrate is resolved.        This report was finalized on 06/06/2018 07:46 by Dr. Oleg Garvey MD.        Lab Results (last 7 days)     Procedure Component Value Units Date/Time    POC Glucose Once [[id number]]  (Abnormal) Collected:  06/12/18 1154    Specimen:  Blood Updated:  06/12/18 1229     Glucose 131 (H) mg/dL      Comment: : [id number] Bhaktater ID: [id number]       CBC & Differential [[id number]] Collected:  06/12/18 0633    Specimen:  Blood Updated:  06/12/18 0741    Narrative:       The following orders were created for panel order CBC & Differential.  Procedure                               Abnormality         Status                     ---------                               -----------         ------                     Manual Differential[[id number]]          Abnormal            Final result               CBC Auto Differential[[id number]]        Abnormal            Final result                 Please view results for these tests on the individual orders.    CBC Auto Differential [[id number]]  (Abnormal) Collected:  06/12/18 0633    Specimen:  Blood Updated:  06/12/18 0741     WBC 28.66 (H) 10*3/mm3      RBC 4.57 10*6/mm3       Hemoglobin 12.8 g/dL      Hematocrit 39.9 %      MCV 87.3 fL      MCH 28.0 pg      MCHC 32.1 (L) g/dL      RDW 18.2 (H) %      RDW-SD 55.3 (H) fl      MPV 11.7 fL      Platelets 411 (H) 10*3/mm3      nRBC 0.1 (H) /100 WBC     Manual Differential [[id number]]  (Abnormal) Collected:  06/12/18 0633    Specimen:  Blood Updated:  06/12/18 0741     Neutrophil % 38.0 (L) %      Lymphocyte % 55.0 (H) %      Monocyte % 4.0 %      Eosinophil % 3.0 %      Neutrophils Absolute 10.89 (H) 10*3/mm3      Lymphocytes Absolute 15.76 (H) 10*3/mm3      Monocytes Absolute 1.15 10*3/mm3      Eosinophils Absolute 0.86 (H) 10*3/mm3      Target Cells Mod/2+     WBC Morphology Normal     Platelet Morphology Normal    Comprehensive Metabolic Panel [[id number]]  (Abnormal) Collected:  06/12/18 0633    Specimen:  Blood Updated:  06/12/18 0704     Glucose 111 (H) mg/dL      BUN 10 mg/dL      Creatinine 0.81 mg/dL      Sodium 144 mmol/L      Potassium 3.6 mmol/L      Chloride 113 (H) mmol/L      CO2 20.0 (L) mmol/L      Calcium 12.1 (H) mg/dL      Total Protein 7.2 g/dL      Albumin 3.40 (L) g/dL      ALT (SGPT) 23 U/L      AST (SGOT) 154 (H) U/L      Alkaline Phosphatase 204 (H) U/L      Total Bilirubin 0.5 mg/dL      eGFR Non African Amer 69 mL/min/1.73      Globulin 3.8 gm/dL      A/G Ratio 0.9 (L) g/dL      BUN/Creatinine Ratio 12.3     Anion Gap 11.0 mmol/L     Narrative:       The MDRD GFR formula is only valid for adults with stable renal function between ages 18 and 70.    Ammonia [[id number]]  (Abnormal) Collected:  06/12/18 0633    Specimen:  Blood Updated:  06/12/18 0655     Ammonia 40 (H) umol/L     POC Glucose Once [[id number]]  (Abnormal) Collected:  06/11/18 2032    Specimen:  Blood Updated:  06/11/18 2044     Glucose 132 (H) mg/dL      Comment: : [id number] Александр Diaz  LMeter ID: [id number]       POC Glucose Once [[id number]]  (Normal) Collected:  06/11/18 1635    Specimen:  Blood Updated:  06/11/18 1658     Glucose 114  mg/dL      Comment: : [id number] Zaki LauraicaMeter ID: [id number]       Blood Culture With CALE - Blood, [[id number]]  (Normal) Collected:  06/06/18 1539    Specimen:  Blood from Arm, Right Updated:  06/11/18 1600     Blood Culture No growth at 5 days    Comprehensive Metabolic Panel [[id number]]  (Abnormal) Collected:  06/11/18 1108    Specimen:  Blood Updated:  06/11/18 1135     Glucose 149 (H) mg/dL      BUN 11 mg/dL      Creatinine 0.91 mg/dL      Sodium 144 mmol/L      Potassium 3.5 mmol/L      Chloride 114 (H) mmol/L      CO2 19.0 (L) mmol/L      Calcium 11.5 (H) mg/dL      Total Protein 6.9 g/dL      Albumin 3.20 (L) g/dL      ALT (SGPT) 22 U/L      AST (SGOT) 140 (H) U/L      Alkaline Phosphatase 184 (H) U/L      Total Bilirubin 0.5 mg/dL      eGFR Non African Amer 60 (L) mL/min/1.73      Globulin 3.7 gm/dL      A/G Ratio 0.9 (L) g/dL      BUN/Creatinine Ratio 12.1     Anion Gap 11.0 mmol/L     Narrative:       The MDRD GFR formula is only valid for adults with stable renal function between ages 18 and 70.    Calcium, Ionized [[id number]]  (Abnormal) Collected:  06/11/18 0755    Specimen:  Blood Updated:  06/11/18 0911     Ionized Calcium 6.11 (H) mg/dL      Collected by [id number]    POC Glucose Once [[id number]]  (Normal) Collected:  06/11/18 0746    Specimen:  Blood Updated:  06/11/18 0808     Glucose 99 mg/dL      Comment: : [id number] Zaki LauraicaMeter ID: [id number]       POC Glucose Once [[id number]]  (Abnormal) Collected:  06/10/18 2213    Specimen:  Blood Updated:  06/10/18 2224     Glucose 137 (H) mg/dL      Comment: : [id number] Castleman TamaraMeter ID: [id number]       POC Glucose Once [[id number]]  (Normal) Collected:  06/10/18 1655    Specimen:  Blood Updated:  06/10/18 1719     Glucose 125 mg/dL      Comment: : [id number] Nancy Ross ID: [id number]       POC Glucose Once [[id number]]  (Abnormal) Collected:  06/10/18 1227    Specimen:  Blood Updated:  06/10/18 1239     Glucose 137  (H) mg/dL      Comment: : [id number] Nancy Ross ID: [id number]       Blood Culture - Blood, Blood, Venous Line [[id number]]  (Abnormal) Collected:  06/04/18 2145    Specimen:  Blood from Hand, Left Updated:  06/10/18 0727     Blood Culture Staphylococcus, coagulase negative (A)     Isolated from Aerobic Bottle     Gram Stain Result Gram positive cocci in clusters    Narrative:       Probable Contaminant  No growth in anaerobic bottle.    CBC & Differential [[id number]] Collected:  06/10/18 0615    Specimen:  Blood Updated:  06/10/18 0706    Narrative:       The following orders were created for panel order CBC & Differential.  Procedure                               Abnormality         Status                     ---------                               -----------         ------                     Manual Differential[[id number]]          Abnormal            Final result               CBC Auto Differential[[id number]]        Abnormal            Final result                 Please view results for these tests on the individual orders.    CBC Auto Differential [[id number]]  (Abnormal) Collected:  06/10/18 0615    Specimen:  Blood Updated:  06/10/18 0706     WBC 31.62 (C) 10*3/mm3      RBC 4.58 10*6/mm3      Hemoglobin 12.8 g/dL      Hematocrit 40.5 %      MCV 88.4 fL      MCH 27.9 (L) pg      MCHC 31.6 (L) g/dL      RDW 17.3 (H) %      RDW-SD 54.5 (H) fl      MPV 11.4 fL      Platelets 559 (H) 10*3/mm3     Manual Differential [[id number]]  (Abnormal) Collected:  06/10/18 0615    Specimen:  Blood Updated:  06/10/18 0706     Neutrophil % 43.0 %      Lymphocyte % 23.0 %      Monocyte % 8.0 %      Eosinophil % 4.0 %      Atypical Lymphocyte % 22.0 (H) %      Neutrophils Absolute 13.60 (H) 10*3/mm3      Lymphocytes Absolute 7.27 (H) 10*3/mm3      Monocytes Absolute 2.53 (H) 10*3/mm3      Eosinophils Absolute 1.26 (H) 10*3/mm3      nRBC 1.0 (H) /100 WBC      Anisocytosis Slight/1+     Crenated RBC's Slight/1+      Poikilocytes Slight/1+     Target Cells Slight/1+     WBC Morphology Normal     Platelet Estimate Increased     Giant Platelets Slight/1+    Ammonia [[id number]]  (Normal) Collected:  06/10/18 0615    Specimen:  Blood Updated:  06/10/18 0651     Ammonia 25 umol/L     Comprehensive Metabolic Panel [[id number]]  (Abnormal) Collected:  06/10/18 0615    Specimen:  Blood Updated:  06/10/18 0647     Glucose 106 (H) mg/dL      BUN 10 mg/dL      Creatinine 0.82 mg/dL      Sodium 141 mmol/L      Potassium 3.9 mmol/L      Chloride 112 (H) mmol/L      CO2 20.0 (L) mmol/L      Calcium 11.1 (H) mg/dL      Total Protein 7.1 g/dL      Albumin 3.30 (L) g/dL      ALT (SGPT) 29 U/L      AST (SGOT) 132 (H) U/L      Alkaline Phosphatase 185 (H) U/L      Total Bilirubin 0.6 mg/dL      eGFR Non African Amer 68 mL/min/1.73      Globulin 3.8 gm/dL      A/G Ratio 0.9 (L) g/dL      BUN/Creatinine Ratio 12.2     Anion Gap 9.0 mmol/L     Narrative:       The MDRD GFR formula is only valid for adults with stable renal function between ages 18 and 70.    Blood Culture - Blood, Blood, Venous Line [[id number]]  (Normal) Collected:  06/04/18 2147    Specimen:  Blood from Arm, Right Updated:  06/09/18 2215     Blood Culture No growth at 5 days    POC Glucose Once [[id number]]  (Normal) Collected:  06/09/18 2133    Specimen:  Blood Updated:  06/09/18 2203     Glucose 129 mg/dL      Comment: : [id number] Gayle Moya ID: [id number]       POC Glucose Once [[id number]]  (Abnormal) Collected:  06/09/18 1706    Specimen:  Blood Updated:  06/09/18 1732     Glucose 173 (H) mg/dL      Comment: : [id number] Ryan Perdomo (Edwards)aMechi ID: [id number]       Comprehensive Metabolic Panel [[id number]]  (Abnormal) Collected:  06/09/18 1316    Specimen:  Blood Updated:  06/09/18 1329     Glucose 131 (H) mg/dL      BUN 12 mg/dL      Creatinine 0.97 mg/dL      Sodium 144 mmol/L      Potassium 3.6 mmol/L      Chloride 114 (H) mmol/L      CO2 18.0 (L) mmol/L       Calcium 11.0 (H) mg/dL      Total Protein 6.9 g/dL      Albumin 3.20 (L) g/dL      ALT (SGPT) 23 U/L      AST (SGOT) 128 (H) U/L      Alkaline Phosphatase 174 (H) U/L      Total Bilirubin 0.5 mg/dL      eGFR Non African Amer 56 (L) mL/min/1.73      Globulin 3.7 gm/dL      A/G Ratio 0.9 (L) g/dL      BUN/Creatinine Ratio 12.4     Anion Gap 12.0 mmol/L     Narrative:       The MDRD GFR formula is only valid for adults with stable renal function between ages 18 and 70.     Comment: : [id number] Aldo JenniferMeter ID: [id number]       CBC & Differential [[id number]] Collected:  06/08/18 0551    Specimen:  Blood Updated:  06/08/18 0723    Narrative:       The following orders were created for panel order CBC & Differential.  Procedure                               Abnormality         Status                     ---------                               -----------         ------                     Manual Differential[[id number]]          Abnormal            Final result               CBC Auto Differential[[id number]]        Abnormal            Final result                 Please view results for these tests on the individual orders.    CBC Auto Differential [[id number]]  (Abnormal) Collected:  06/08/18 0551    Specimen:  Blood Updated:  06/08/18 0723     WBC 29.55 (H) 10*3/mm3      RBC 4.61 10*6/mm3      Hemoglobin 13.0 g/dL      Hematocrit 41.1 %      MCV 89.2 fL      MCH 28.2 pg      MCHC 31.6 (L) g/dL      RDW 17.3 (H) %      RDW-SD 55.3 (H) fl      MPV 11.3 fL      Platelets 554 (H) 10*3/mm3      nRBC 0.1 (H) /100 WBC     Manual Differential [[id number]]  (Abnormal) Collected:  06/08/18 0551    Specimen:  Blood Updated:  06/08/18 0723     Neutrophil % 34.0 (L) %      Lymphocyte % 56.0 (H) %      Monocyte % 7.0 %      Eosinophil % 2.0 %      Bands %  1.0 %      Neutrophils Absolute 10.34 (H) 10*3/mm3      Lymphocytes Absolute 16.55 (H) 10*3/mm3      Monocytes Absolute 2.07 (H) 10*3/mm3      Eosinophils Absolute 0.59  10*3/mm3      Anisocytosis Slight/1+     Smudge Cells Slight/1+     Platelet Estimate Increased    Ammonia [[id number]]  (Abnormal) Collected:  06/08/18 0551    Specimen:  Blood Updated:  06/08/18 0624     Ammonia 49 (H) umol/L     Comprehensive Metabolic Panel [[id number]]  (Abnormal) Collected:  06/08/18 0551    Specimen:  Blood Updated:  06/08/18 0624     Glucose 120 (H) mg/dL      BUN 15 mg/dL      Creatinine 1.04 mg/dL      Sodium 145 mmol/L      Potassium 4.5 mmol/L      Chloride 114 (H) mmol/L      CO2 19.0 (L) mmol/L      Calcium 11.0 (H) mg/dL      Total Protein 7.3 g/dL      Albumin 3.40 (L) g/dL      ALT (SGPT) 26 U/L      AST (SGOT) 130 (H) U/L      Alkaline Phosphatase 169 (H) U/L      Total Bilirubin 0.7 mg/dL      eGFR Non African Amer 52 (L) mL/min/1.73      Globulin 3.9 gm/dL      A/G Ratio 0.9 (L) g/dL      BUN/Creatinine Ratio 14.4     Anion Gap 12.0 mmol/L     Narrative:       The MDRD GFR formula is only valid for adults with stable renal function between ages 18 and 70.    POC Glucose Once [[id number]]  (Abnormal) Collected:  06/07/18 2026    Specimen:  Blood Updated:  06/07/18 2038     Glucose 151 (H) mg/dL      Comment: : [id number] [facility] Hospital[facility]: [id number]       POC Glucose Once [[id number]]  (Abnormal) Collected:  06/07/18 1656    Specimen:  Blood Updated:  06/07/18 1707     Glucose 143 (H) mg/dL      Comment: : [id number] Aldo ReneeniferMeter ID: [id number]       POC Glucose Once [[id number]]  (Normal) Collected:  06/07/18 1133    Specimen:  Blood Updated:  06/07/18 1144     Glucose 118 mg/dL      Comment: : [id number] Aldo ReneeniferMeter ID: [id number]       POC Glucose Once [[id number]]  (Normal) Collected:  06/07/18 0808    Specimen:  Blood Updated:  06/07/18 0820     Glucose 123 mg/dL      Comment: : [id number] Aldo BrooksferMeter ID: [id number]       CBC & Differential [[id number]] Collected:  06/07/18 0548    Specimen:  Blood Updated:  06/07/18 0640    Narrative:        The following orders were created for panel order CBC & Differential.  Procedure                               Abnormality         Status                     ---------                               -----------         ------                     Manual Differential[[id number]]          Abnormal            Final result               CBC Auto Differential[[id number]]        Abnormal            Final result                 Please view results for these tests on the individual orders.    CBC Auto Differential [[id number]]  (Abnormal) Collected:  06/07/18 0548    Specimen:  Blood Updated:  06/07/18 0640     WBC 26.35 (H) 10*3/mm3      RBC 4.30 10*6/mm3      Hemoglobin 12.2 g/dL      Hematocrit 38.1 %      MCV 88.6 fL      MCH 28.4 pg      MCHC 32.0 (L) g/dL      RDW 16.9 (H) %      RDW-SD 53.1 fl      MPV 11.4 fL      Platelets 561 (H) 10*3/mm3     Manual Differential [[id number]]  (Abnormal) Collected:  06/07/18 0548    Specimen:  Blood Updated:  06/07/18 0640     Neutrophil % 46.5 %      Lymphocyte % 34.3 %      Monocyte % 5.1 %      Eosinophil % 3.0 %      Basophil % 1.0 %      Bands %  2.0 %      Atypical Lymphocyte % 8.1 (H) %      Neutrophils Absolute 12.78 (H) 10*3/mm3      Lymphocytes Absolute 9.04 (H) 10*3/mm3      Monocytes Absolute 1.34 (H) 10*3/mm3      Eosinophils Absolute 0.79 (H) 10*3/mm3      Basophils Absolute 0.26 (H) 10*3/mm3      Anisocytosis Slight/1+     Macrocytes Slight/1+     Target Cells Slight/1+     WBC Morphology Normal     Platelet Estimate Increased     Giant Platelets Mod/2+    Comprehensive Metabolic Panel [[id number]]  (Abnormal) Collected:  06/07/18 0548    Specimen:  Blood Updated:  06/07/18 0635     Glucose 149 (H) mg/dL      BUN 15 mg/dL      Creatinine 1.05 mg/dL      Sodium 143 mmol/L      Potassium 4.3 mmol/L      Chloride 113 (H) mmol/L      CO2 19.0 (L) mmol/L      Calcium 10.8 (H) mg/dL      Total Protein 7.1 g/dL      Albumin 3.30 (L) g/dL      ALT (SGPT) 24 U/L      AST  (SGOT) 126 (H) U/L      Alkaline Phosphatase 177 (H) U/L      Total Bilirubin 0.8 mg/dL      eGFR Non African Amer 51 (L) mL/min/1.73      Globulin 3.8 gm/dL      A/G Ratio 0.9 (L) g/dL      BUN/Creatinine Ratio 14.3     Anion Gap 11.0 mmol/L     Narrative:       The MDRD GFR formula is only valid for adults with stable renal function between ages 18 and 70.    Ammonia [[id number]]  (Abnormal) Collected:  06/07/18 0548    Specimen:  Blood Updated:  06/07/18 0634     Ammonia 48 (H) umol/L     POC Glucose Once [[id number]]  (Abnormal) Collected:  06/06/18 2007    Specimen:  Blood Updated:  06/06/18 2026     Glucose 155 (H) mg/dL      Comment: : [id number] Isrrael CheungonnaMeter ID: [id number]       POC Glucose Once [[id number]]  (Abnormal) Collected:  06/06/18 1633    Specimen:  Blood Updated:  06/06/18 1652     Glucose 133 (H) mg/dL      Comment: : [id number] Wilber PosadasolynMeter ID: [id number]       Eosinophil Smear - Urine, Urine, Clean Catch [[id number]] Collected:  06/05/18 0817    Specimen:  Urine from Urine, Clean Catch Updated:  06/06/18 1527     Eosinophil, Urine 1 %      Comment:                                   <5% few or none seen       Narrative:       Performed at:  [address] [place] [address]  [id number]  : Kev Patel PhD, Phone:  [phone]    Urine Culture - Urine, [[id number]]  (Abnormal)  (Susceptibility) Collected:  06/04/18 2028    Specimen:  Urine from Urine, Catheter Updated:  06/06/18 1345     Urine Culture >100,000 CFU/mL Klebsiella pneumoniae ssp pneumoniae (A)    Narrative:       Cefazolin results may be used to predict the potential effectiveness of oral cephalosporins for treating uncomplicated urinary tract infections.    Susceptibility      Klebsiella pneumoniae ssp pneumoniae     CLARIBEL     Ampicillin >=32 ug/ml Resistant     Ampicillin + Sulbactam 4 ug/ml Susceptible     Aztreonam <=1 ug/ml Susceptible  [1]      Cefazolin <=4 ug/ml Susceptible      Cefepime <=1 ug/ml Susceptible     Ceftriaxone <=1 ug/ml Susceptible     Ertapenem <=0.5 ug/ml Susceptible     ESBL Confirmation Test NEG  Negative     Gentamicin <=1 ug/ml Susceptible     Levofloxacin >=8 ug/ml Resistant     Meropenem <=0.25 ug/ml Susceptible     Nitrofurantoin 128 ug/ml Resistant     Piperacillin + Tazobactam <=4 ug/ml Susceptible     Trimethoprim + Sulfamethoxazole <=20 ug/ml Susceptible            [1]   Appended report. These results have been appended to a previously final verified report.                   POC Glucose Once [[id number]]  (Abnormal) Collected:  06/06/18 1127    Specimen:  Blood Updated:  06/06/18 1147     Glucose 139 (H) mg/dL      Comment: : [id number] Wilber PosadasNoWaitnMeter ID: [id number]       POC Glucose Once [[id number]]  (Normal) Collected:  06/06/18 0751    Specimen:  Blood Updated:  06/06/18 0821     Glucose 112 mg/dL      Comment: : [id number] Wilber PosadasNoWaitnMeter ID: [id number]       CBC & Differential [[id number]] Collected:  06/06/18 0634    Specimen:  Blood Updated:  06/06/18 0800    Narrative:       The following orders were created for panel order CBC & Differential.  Procedure                               Abnormality         Status                     ---------                               -----------         ------                     Manual Differential[[id number]]          Abnormal            Final result               CBC Auto Differential[[id number]]        Abnormal            Final result                 Please view results for these tests on the individual orders.    CBC Auto Differential [[id number]]  (Abnormal) Collected:  06/06/18 0634    Specimen:  Blood Updated:  06/06/18 0800     WBC 28.70 (H) 10*3/mm3      RBC 4.32 10*6/mm3      Hemoglobin 12.0 g/dL      Hematocrit 37.9 %      MCV 87.7 fL      MCH 27.8 (L) pg      MCHC 31.7 (L) g/dL      RDW 16.5 (H) %      RDW-SD 52.2 fl      MPV 10.8 fL      Platelets 538 (H) 10*3/mm3     Manual Differential  [[id number]]  (Abnormal) Collected:  06/06/18 0634    Specimen:  Blood Updated:  06/06/18 0800     Neutrophil % 47.0 %      Lymphocyte % 41.0 %      Monocyte % 4.0 %      Eosinophil % 2.0 %      Atypical Lymphocyte % 6.0 (H) %      Neutrophils Absolute 13.49 (H) 10*3/mm3      Lymphocytes Absolute 11.77 (H) 10*3/mm3      Monocytes Absolute 1.15 10*3/mm3      Eosinophils Absolute 0.57 10*3/mm3      Target Cells Slight/1+     WBC Morphology Normal     Platelet Estimate Increased    Comprehensive Metabolic Panel [[id number]]  (Abnormal) Collected:  06/06/18 0634    Specimen:  Blood Updated:  06/06/18 0656     Glucose 118 (H) mg/dL      BUN 19 mg/dL      Creatinine 1.26 mg/dL      Sodium 144 mmol/L      Potassium 4.0 mmol/L      Chloride 112 (H) mmol/L      CO2 22.0 (L) mmol/L      Calcium 10.1 mg/dL      Total Protein 7.0 g/dL      Albumin 3.40 (L) g/dL      ALT (SGPT) 23 U/L      AST (SGOT) 135 (H) U/L      Alkaline Phosphatase 186 (H) U/L      Total Bilirubin 0.7 mg/dL      eGFR Non African Amer 42 (L) mL/min/1.73      Globulin 3.6 gm/dL      A/G Ratio 0.9 (L) g/dL      BUN/Creatinine Ratio 15.1     Anion Gap 10.0 mmol/L     Narrative:       The MDRD GFR formula is only valid for adults with stable renal function between ages 18 and 70.    POC Glucose Once [[id number]]  (Abnormal) Collected:  06/05/18 2118    Specimen:  Blood Updated:  06/05/18 2138     Glucose 131 (H) mg/dL      Comment: : [id number] Isrrael [facility] ID: [id number]       Blood Culture ID, PCR - Blood, [[id number]]  (Abnormal) Collected:  06/04/18 2145    Specimen:  Blood from Hand, Left Updated:  06/05/18 1946     BCID, PCR Staphylococcus spp, not aureus. Identification by BCID PCR. (C)    POC Glucose Once [[id number]]  (Abnormal) Collected:  06/05/18 1753    Specimen:  Blood Updated:  06/05/18 1805     Glucose 160 (H) mg/dL      Comment: : [id number] Tk MalloryMeter ID: [id number]       POC Glucose Once [[id number]]  (Normal) Collected:   "06/05/18 1311    Specimen:  Blood Updated:  06/05/18 1322     Glucose 129 mg/dL      Comment: : [id number] Tk CarreonMeter ID: [id number]       S. Pneumo Ag Urine or CSF - Urine, Urine, Clean Catch [[id number]]  (Normal) Collected:  06/05/18 0816    Specimen:  Urine from Urine, Clean Catch Updated:  06/05/18 1259     Strep Pneumo Ag Negative    Legionella Antigen, Urine - Urine, Urine, Clean Catch [[id number]]  (Normal) Collected:  06/05/18 0816    Specimen:  Urine from Urine, Clean Catch Updated:  06/05/18 1259     LEGIONELLA ANTIGEN, URINE Negative    Sodium, Urine, Random - Urine, Clean Catch [[id number]]  (Abnormal) Collected:  06/05/18 0816    Specimen:  Urine from Urine, Clean Catch Updated:  06/05/18 1252     Sodium, Urine 105 (H) mmol/L     Creatinine, Urine, Random - Urine, Clean Catch [[id number]] Collected:  06/05/18 0816    Specimen:  Urine from Urine, Clean Catch Updated:  06/05/18 1252     Creatinine, Urine 55.3 mg/dL         Hospital Course:  The patient is a 73 y.o. female who follows with Magan Lopez for her primary care. She has a past medical history significant for newly diagnosed cholangiocarcinoma, c diff colitis, hypertension, chronic leukocytosis, diabetes mellitus type II, and hyperlipidemia.  Patient presented emerged Department on 6/4/18 with altered mental status.  She is found have an elevated ammonia level with concern for left upper lobe pneumonia.  In addition, she is found to have urinalysis positive for urinary tract infection.  She started being treated for clustered him to physical colitis on oral vancomycin at home.  She was admitted to the hospital service for further evaluation and management.    Repeat chest x-ray does not reveal any acute infiltrate was suspected that her \"pneumonia\" was truly her hiatal hernia on chest x-ray.  Urine ultimately grew Klebsiella.  She was placed on aztreonam and has completed 7 days of this therapy as of today.  She was given lactulose " "for her ammonia level.  This is been difficult to get down her as it does not have a favorable flavor.  Is given Megace for appetite stimulation which has not significantly improved her symptoms.  She has been seen by our occupational therapy and physical therapy departments.  She did very difficult to ambulate.  Multiple family meetings resulted in a consult to Dr. Ramirez with oncology.  He saw them this morning and they opted to proceed with hospice therapy.  Determination of where hospice will occur ultimately revealed the patient will go home with hospice.  All of her [facility] medical clinic has already been there.  She received Zometa for hypercalcemia.  We will continue her oral vancomycin for an additional 13 doses.  She has been having loose stools secondary to the lactulose. She has had some confusion but she also has a history of dementia. Her asterixis is improved with lactulose. She is more alert, but is at times lethargic. She will be discharged home today with hospice. It has been explained to the patient that with hospice they aim at symptom control and not return to the hospital. Family verbalizes understanding.     Physical Exam on Discharge:  /66 (BP Location: Right arm, Patient Position: Lying)   Pulse 59   Temp 98.3 °F (36.8 °C) (Oral)   Resp 20   Ht 167.6 cm (65.98\")   Wt 89.9 kg (198 lb 1.6 oz)   SpO2 94%   BMI 31.99 kg/m²   Physical Exam   Constitutional: She is oriented to person, place, and time. She appears well-developed and well-nourished.   Ill appearing.    HENT:   Head: Normocephalic and atraumatic.   Eyes: Conjunctivae and EOM are normal. Pupils are equal, round, and reactive to light.   Neck: Neck supple. No JVD present. No thyromegaly present.   Cardiovascular: Normal rate, regular rhythm, normal heart sounds and intact distal pulses.  Exam reveals no gallop and no friction rub.    No murmur heard.  Pulmonary/Chest: Effort normal and breath sounds normal. No respiratory " distress. She has no wheezes. She has no rales (diffusely tender today. ). She exhibits no tenderness.   Abdominal: Soft. She exhibits no distension. There is no tenderness. There is no rebound and no guarding.   Musculoskeletal: Normal range of motion. She exhibits no edema, tenderness or deformity.   Lymphadenopathy:     She has no cervical adenopathy.   Neurological: She is alert and oriented to person, place, and time. She displays normal reflexes. No cranial nerve deficit. She exhibits normal muscle tone.   Skin: Skin is warm and dry. No rash noted.   Psychiatric: She has a normal mood and affect. Her behavior is normal. Judgment and thought content normal.     Condition on Discharge: to hospice. Poor overall prognosis    Discharge Disposition:  Home or Self Care    Discharge Medications:     Discharge Medications      New Medications      Instructions Start Date   dry mouth spray solution   1 spray, Mouth/Throat, 5 Times Daily      famotidine 20 MG tablet  Commonly known as:  PEPCID   20 mg, Oral, Daily   Start Date:  6/13/2018     fentaNYL 25 MCG/HR patch  Commonly known as:  DURAGESIC   1 patch, Transdermal, Every 72 Hours   Start Date:  6/15/2018     lactulose 20 GM/30ML solution solution   20 g, Oral, Daily, Can titrate for confusion.   Start Date:  6/13/2018     megestrol 40 MG tablet  Commonly known as:  MEGACE  Replaces:  megestrol 40 MG/ML suspension   40 mg, Oral, 4 Times Daily         Changes to Medications      Instructions Start Date   traMADol 50 MG tablet  Commonly known as:  ULTRAM  What changed:  · how much to take  · when to take this   25 mg, Oral, Every 6 Hours PRN      vancomycin 50 MG/ML solution oral solution  What changed:  how much to take   125 mg, Oral, Every 6 Hours Scheduled         Continue These Medications      Instructions Start Date   acetaminophen 325 MG tablet  Commonly known as:  TYLENOL   650 mg, Oral, Every 4 Hours PRN      amLODIPine 5 MG tablet  Commonly known as:   NORVASC   5 mg, Oral, Every 24 Hours Scheduled      choline fenofibrate 135 MG capsule  Commonly known as:  TRILIPIX   135 mg, Oral, Nightly      CORNSILK PO   1 tablet, Oral, Daily      donepezil 10 MG tablet  Commonly known as:  ARICEPT   10 mg, Oral, Nightly      escitalopram 20 MG tablet  Commonly known as:  LEXAPRO   20 mg, Oral, Daily      furosemide 20 MG tablet  Commonly known as:  LASIX   20 mg, Oral, Every Other Day      IMODIUM PO   2 mg, Oral, 3 Times Daily PRN      Insulin Glargine 100 UNIT/ML injection pen  Commonly known as:  LANTUS SOLOSTAR   40 Units, Subcutaneous, 2 Times Daily      losartan 50 MG tablet  Commonly known as:  COZAAR   100 mg, Oral, Daily      magnesium oxide 400 MG tablet  Commonly known as:  MAG-OX   400 mg, Oral, Daily      memantine 10 MG tablet  Commonly known as:  NAMENDA   10 mg, Oral, 2 Times Daily      saccharomyces boulardii 250 MG capsule  Commonly known as:  FLORASTOR   250 mg, Oral, 2 Times Daily      simvastatin 40 MG tablet  Commonly known as:  ZOCOR   40 mg, Oral, Daily      thiamine 100 MG tablet  Commonly known as:  VITAMIN B-1   100 mg, Oral, Daily         Stop These Medications    diazePAM 2 MG tablet  Commonly known as:  VALIUM     esomeprazole 40 MG capsule  Commonly known as:  nexIUM     HUMALOG KWIKPEN 100 UNIT/ML solution pen-injector  Generic drug:  Insulin Lispro     hydrochlorothiazide 12.5 MG capsule  Commonly known as:  MICROZIDE     megestrol 40 MG/ML suspension  Commonly known as:  MEGACE  Replaced by:  megestrol 40 MG tablet          Discharge Diet:   Diet Instructions     Diet: Consistent Carbohydrate; Thin       Discharge Diet:  Consistent Carbohydrate    Fluid Consistency:  Thin        Activity at Discharge:   Activity Instructions     Activity as Tolerated             Follow-up Appointments:   Future Appointments  Date Time Provider Department Center   7/25/2018 9:00 AM Yoan Regan MD [facility] CD PAD None   7/31/2018 2:10 PM Esteban Pacheco MD [facility]  U PAD None   8/22/2018 9:30 AM IMAGING CT/US ENT [facility] ENT PAD None   8/22/2018 9:45 AM JULI Alvarado [facility] ENT PAD None       Test Results Pending at Discharge: None    JULI Beyer  06/12/18  12:45 PM    Time: 50 minutes.     I personally evaluated and examined the patient in conjunction with JULI Rankin and agree with the assessment, treatment plan, and disposition of the patient as recorded by her. My history, exam, and further recommendations are: I have reviewed and agree with the plans. KT.         Mickey Schmidt MD  06/13/18  6:23 PM

## 2018-06-12 NOTE — PROGRESS NOTES
Continued Stay Note  VANESSA Philip     Patient Name: Bindu Fountain  MRN: [id number]  Today's Date: 6/12/2018    Admit Date: 6/4/2018          Discharge Plan     Row Name 06/12/18 1439       Plan    Plan Home with [facility]    Patient/Family in Agreement with Plan yes    Final Discharge Disposition Code 50 - home with hospice    Final Note Family has changed their mind and have decided to take pt home with hospice. Mary Jane with [facility] has made the arrangements. Pt was transported home by [facility] [phone].              Discharge Codes    No documentation.       Expected Discharge Date and Time     Expected Discharge Date Expected Discharge Time    Jun 12, 2018             DAWNA Aguilar

## 2018-06-12 NOTE — PROGRESS NOTES
Continued Stay Note  [facility]     Patient Name: Bindu Fountain  MRN: [id number]  Today's Date: 6/12/2018    Admit Date: 6/4/2018          Discharge Plan     Row Name 06/12/18 0956       Plan    Plan [facility]    Patient/Family in Agreement with Plan yes    Plan Comments Spoke with pt's POA, Javi [phone], and answered his questions about hospice and nh. He is requesting that pt go to [facility] as a rehab pt at this time. He is wanting her to work on getting stronger. [place] can take pt skilled. Notifying JULI Oseguera.    Row Name 06/12/18 0926       Plan    Plan [facility]    Patient/Family in Agreement with Plan yes    Plan Comments Orders rec'd for hospice at [facility]. Spoke with pt's daughter in the room and she said they do have some questions. Informed her that she would have to be private pay at the nh and she voiced understanding. She wants the family to sit down and discuss everything with hospice. [facility] is the only option in that area so spoke with Mary Jane [facility] and requested that she call pt's POA, Javi [phone], to arrange a time to meet.               Discharge Codes    No documentation.           DAWNA Aguilar

## 2018-06-12 NOTE — THERAPY DISCHARGE NOTE
Acute Care - Physical Therapy Discharge Summary  [facility]       Patient Name: Bindu Fountain  : 1945  MRN: [id number]    Today's Date: 2018  Onset of Illness/Injury or Date of Surgery: 18    Date of Referral to PT: 18  Referring Physician: JULI Quevedo      Admit Date: 2018      PT Recommendation and Plan    Visit Dx:    ICD-10-CM ICD-9-CM   1. Urinary tract infection without hematuria, site unspecified N39.0 599.0   2. Pneumonia due to infectious organism, unspecified laterality, unspecified part of lung J18.9 136.9     484.8   3. Altered mental status, unspecified altered mental status type R41.82 780.97   4. Clostridium difficile infection B96.89 041.84   5. Oral phase dysphagia R13.11 787.21   6. Decreased activities of daily living (ADL) Z78.9 V49.89   7. Impaired cognition R41.89 294.9   8. Impaired mobility and endurance Z74.09 V49.89             Outcome Measures     Row Name 18 1400 18 1100          How much help from another person do you currently need...    Turning from your back to your side while in flat bed without using bedrails? 2  -NW  --     Moving from lying on back to sitting on the side of a flat bed without bedrails? 2  -NW  --     Moving to and from a bed to a chair (including a wheelchair)? 2  -NW  --     Standing up from a chair using your arms (e.g., wheelchair, bedside chair)? 2  -NW  --     Climbing 3-5 steps with a railing? 1  -NW  --     To walk in hospital room? 3  -NW  --     AM-PAC 6 Clicks Score 12  -NW  --        How much help from another is currently needed...    Putting on and taking off regular lower body clothing?  -- 2  -TS     Bathing (including washing, rinsing, and drying)  -- 2  -TS     Toileting (which includes using toilet bed pan or urinal)  -- 2  -TS     Putting on and taking off regular upper body clothing  -- 3  -TS     Taking care of personal grooming (such as brushing teeth)  -- 3  -TS     Eating meals  -- 3  -TS      Score  -- 15  -TS        Functional Assessment    Outcome Measure Options AM-PAC 6 Clicks Basic Mobility (PT)  -NW AM-PAC 6 Clicks Daily Activity (OT)  -TS       User Key  (r) = Recorded By, (t) = Taken By, (c) = Cosigned By    Initials Name Provider Type    TS Anu Herrera, BEJARANO/L Occupational Therapy Assistant    VINEET Dorsey, PTA Physical Therapy Assistant            Therapy Suggested Charges     Code   Minutes Charges    None                   PT Rehab Goals     Row Name 06/12/18 8736             Bed Mobility Goal 1 (PT)    Activity/Assistive Device (Bed Mobility Goal 1, PT) bed mobility activities, all  -AH      [place] Level/Cues Needed (Bed Mobility Goal 1, PT) supervision required  -AH      Time Frame (Bed Mobility Goal 1, PT) by discharge  -AH      Progress/Outcomes (Bed Mobility Goal 1, PT) goal not met  -AH         Transfer Goal 1 (PT)    Activity/Assistive Device (Transfer Goal 1, PT) transfers, all  -AH      [place] Level/Cues Needed (Transfer Goal 1, PT) supervision required  -AH      Time Frame (Transfer Goal 1, PT) by discharge  -AH      Progress/Outcome (Transfer Goal 1, PT) goal not met  -AH         Gait Training Goal 1 (PT)    Activity/Assistive Device (Gait Training Goal 1, PT) gait (walking locomotion);assistive device use  -AH      [place] Level (Gait Training Goal 1, PT) supervision required  -AH      Distance (Gait Goal 1, PT) 150  -AH      Time Frame (Gait Training Goal 1, PT) by discharge  -AH      Progress/Outcome (Gait Training Goal 1, PT) goal not met  -AH         Patient Education Goal (PT)    Activity (Patient Education Goal, PT) HEP, safety, gait, benefit of activity  -AH      [place]/Cues/Accuracy (Memory Goal 2, PT) demonstrates adequately;verbalizes understanding  -AH      Time Frame (Patient Education Goal, PT) by discharge  -AH      Progress/Outcome (Patient Education Goal, PT) goal not met  -AH        User Key  (r) = Recorded By, (t) = Taken By,  (c) = Cosigned By    Initials Name Provider Type Discipline     Capri Preciado, PTA Physical Therapy Assistant PT              PT Discharge Summary  Reason for Discharge: Discharge from facility  Outcomes Achieved: Refer to plan of care for updates on goals achieved  Discharge Destination: [facility]      Capri Preciado PTA   6/12/2018

## 2018-06-12 NOTE — PLAN OF CARE
"Problem: Patient Care Overview  Goal: Plan of Care Review  Outcome: Ongoing (interventions implemented as appropriate)   06/12/18 1121   Coping/Psychosocial   Plan of Care Reviewed With patient;daughter   OTHER   Outcome Summary Pt received 3 trials of thin liquid in tx today. Pt did not demonstrate any s/s of aspiration with the trials presented. SLP attempted to do cognitive-linguistic tasks such as yes/no and same/different. Pt was unwilling to participate in answering the questions. Pt stated \"just forget it, I have no idea.\" SLP also asked orientation questions. Pt was oriented to her name; however pt stated \"I don't care,\" or \" I don't know\" when asked the day, month, year, and place. It is recomended that pt remain on current diet. SLP will continue to follow pt until discharge.          "

## 2018-06-12 NOTE — PROGRESS NOTES
Continued Stay Note   [facility]     Patient Name: Bindu Fountain  MRN: [id number]  Today's Date: 6/12/2018    Admit Date: 6/4/2018          Discharge Plan     Row Name 06/12/18 0926       Plan    Plan [facility]    Patient/Family in Agreement with Plan yes    Plan Comments Orders rec'd for hospice at [facility]. Spoke with pt's daughter in the room and she said they do have some questions. Informed her that she would have to be private pay at the nh and she voiced understanding. She wants the family to sit down and discuss everything with hospice. [facility] is the only option in that area so spoke with Mary Jane Randolph and requested that she call pt's POA, Javi [phone], to arrange a time to meet.               Discharge Codes    No documentation.           DAWNA Aguilar

## 2018-06-12 NOTE — PLAN OF CARE
Problem: Patient Care Overview  Goal: Plan of Care Review  Outcome: Ongoing (interventions implemented as appropriate)   06/12/18 0978   Coping/Psychosocial   Plan of Care Reviewed With patient   Plan of Care Review   Progress no change   OTHER   Outcome Summary Pt has only consumed 25% of the last 2 meals. Buttocks noted to be excoriated d/t diarrhea. Recommend to initiate Glucerna BID for extra kcal and protein. Will cont to encourage food and fluid intake. Will cont to follow for nutrition needs.       Problem: Nutrition, Imbalanced: Inadequate Oral Intake (Adult)  Goal: Identify Related Risk Factors and Signs and Symptoms  Outcome: Outcome(s) achieved Date Met: 06/12/18    Goal: Improved Oral Intake  Outcome: Ongoing (interventions implemented as appropriate)    Goal: Prevent Further Weight Loss  Outcome: Ongoing (interventions implemented as appropriate)

## 2018-06-13 NOTE — THERAPY DISCHARGE NOTE
Acute Care - Occupational Therapy Discharge Summary  [facility]     Patient Name: Bindu Fountain  : 1945  MRN: [id number]    Today's Date: 2018  Onset of Illness/Injury or Date of Surgery: 18    Date of Referral to OT: 18  Referring Physician: JULI Quevedo      Admit Date: 2018        OT Recommendation and Plan    Visit Dx:    ICD-10-CM ICD-9-CM   1. Urinary tract infection without hematuria, site unspecified N39.0 599.0   2. Pneumonia due to infectious organism, unspecified laterality, unspecified part of lung J18.9 136.9     484.8   3. Altered mental status, unspecified altered mental status type R41.82 780.97   4. Clostridium difficile infection B96.89 041.84   5. Oral phase dysphagia R13.11 787.21   6. Decreased activities of daily living (ADL) Z78.9 V49.89   7. Impaired cognition R41.89 294.9   8. Impaired mobility and endurance Z74.09 V49.89                     OT Rehab Goals     Row Name 18 0731             Transfer Goal 1 (OT)    Activity/Assistive Device (Transfer Goal 1, OT) bed-to-chair/chair-to-bed;toilet;commode, bedside without drop arms  -TS      [place] Level/Cues Needed (Transfer Goal 1, OT) minimum assist (75% or more patient effort)  -TS      Time Frame (Transfer Goal 1, OT) long term goal (LTG);10 days  -TS      Progress/Outcome (Transfer Goal 1, OT) goal not met  -TS         Dressing Goal 1 (OT)    Activity/Assistive Device (Dressing Goal 1, OT) dressing skills, all  -TS      [place]/Cues Needed (Dressing Goal 1, OT) minimum assist (75% or more patient effort)  -TS      Time Frame (Dressing Goal 1, OT) long term goal (LTG);10 days  -TS      Progress/Outcome (Dressing Goal 1, OT) goal not met  -TS         Toileting Goal 1 (OT)    Activity/Device (Toileting Goal 1, OT) toileting skills, all;commode, bedside without drop arms  -TS      [place] Level/Cues Needed (Toileting Goal 1, OT) minimum assist (75% or more patient effort)  -TS      Time  Frame (Toileting Goal 1, OT) long term goal (LTG);10 days  -TS      Progress/Outcome (Toileting Goal 1, OT) goal not met  -TS        User Key  (r) = Recorded By, (t) = Taken By, (c) = Cosigned By    Initials Name Provider Type Discipline    TS DARCI Ta/L Occupational Therapy Assistant OT                Outcome Measures     Row Name 06/11/18 1400 06/11/18 1100          How much help from another person do you currently need...    Turning from your back to your side while in flat bed without using bedrails? 2  -NW  --     Moving from lying on back to sitting on the side of a flat bed without bedrails? 2  -NW  --     Moving to and from a bed to a chair (including a wheelchair)? 2  -NW  --     Standing up from a chair using your arms (e.g., wheelchair, bedside chair)? 2  -NW  --     Climbing 3-5 steps with a railing? 1  -NW  --     To walk in hospital room? 3  -NW  --     AM-PAC 6 Clicks Score 12  -NW  --        How much help from another is currently needed...    Putting on and taking off regular lower body clothing?  -- 2  -TS     Bathing (including washing, rinsing, and drying)  -- 2  -TS     Toileting (which includes using toilet bed pan or urinal)  -- 2  -TS     Putting on and taking off regular upper body clothing  -- 3  -TS     Taking care of personal grooming (such as brushing teeth)  -- 3  -TS     Eating meals  -- 3  -TS     Score  -- 15  -TS        Functional Assessment    Outcome Measure Options AM-PAC 6 Clicks Basic Mobility (PT)  -NW AM-PAC 6 Clicks Daily Activity (OT)  -TS       User Key  (r) = Recorded By, (t) = Taken By, (c) = Cosigned By    Initials Name Provider Type    RAIMUNDO Hopkins Occupational Therapy Assistant    VINEET Dorsey PTA Physical Therapy Assistant          Therapy Suggested Charges     Code   Minutes Charges    None                 OT Discharge Summary  Reason for Discharge: Discharge from facility  Outcomes Achieved: Refer to plan of care for  updates on goals achieved  Discharge Destination: Home with home health, Home with assist      RAIMUNDO Garzon  6/13/2018

## 2018-06-13 NOTE — THERAPY DISCHARGE NOTE
Acute Care - Speech Language Pathology Discharge Summary  [facility]       Patient Name: Bindu Fountain  : 1945  MRN: [id number]    Today's Date: 2018  Onset of Illness/Injury or Date of Surgery: 18       Referring Physician: JULI Quevedo        Admit Date: 2018      SLP Recommendation and Plan  Mechanical soft solids and thin liquids    Visit Dx:    ICD-10-CM ICD-9-CM   1. Urinary tract infection without hematuria, site unspecified N39.0 599.0   2. Pneumonia due to infectious organism, unspecified laterality, unspecified part of lung J18.9 136.9     484.8   3. Altered mental status, unspecified altered mental status type R41.82 780.97   4. Clostridium difficile infection B96.89 041.84   5. Oral phase dysphagia R13.11 787.21   6. Decreased activities of daily living (ADL) Z78.9 V49.89   7. Impaired cognition R41.89 294.9   8. Impaired mobility and endurance Z74.09 V49.89                     SLP GOALS     Row Name 18 1500 18 1047 18 0917       Oral Nutrition/Hydration Goal 1 (SLP)    Oral Nutrition/Hydration Goal 1, SLP Pt will tolerate least restrictive diet with no overt s/s of aspiration.  -MB Pt will tolerate least restrictive diet with no overt s/s of aspiration.  -MB (r) MH (t) MB (c) Pt will tolerate least restrictive diet with no overt s/s of aspiration.  -MB (r) MH (t) MB (c)    Time Frame (Oral Nutrition/Hydration Goal 1, SLP) by discharge  -MB by discharge  -MB (r) MH (t) MB (c) by discharge  -MB (r) MH (t) MB (c)    Barriers (Oral Nutrition/Hydration Goal 1, SLP) n/a  -MB n/a  -MB (r) MH (t) MB (c) n/a  -MB (r) MH (t) MB (c)    Progress/Outcomes (Oral Nutrition/Hydration Goal 1, SLP) goal met  -MB continuing progress toward goal  -MB (r) MH (t) MB (c) continuing progress toward goal  -MB (r) MH (t) MB (c)       Words/Phrases/Sentences Goal 1 (SLP)    Improve Ability to Comprehend Words/Phrases/Sentences Through: Goal 1 (SLP) identify body part;independently  (over 90% accuracy)  -MB identify body part;independently (over 90% accuracy)  -MB (r) MH (t) MB (c) identify body part;independently (over 90% accuracy)  -MB (r) MH (t) MB (c)    Time Frame (Identify Objects and Pictures Goal 1, SLP) short term goal (STG);by discharge  -MB short term goal (STG);by discharge  -MB (r) MH (t) MB (c) short term goal (STG);by discharge  -MB (r) MH (t) MB (c)    Barriers (Identify Objects and Pictures Goal 1, SLP) fatigue  -MB fatigue  -MB (r) MH (t) MB (c) fatigue  -MB (r) MH (t) MB (c)    Progress/Outcomes (Identify Objects and Pictures Goal 1, SLP) goal not met  -MB goal ongoing  -MB (r) MH (t) MB (c) goal ongoing  -MB (r) MH (t) MB (c)       Comprehend Questions Goal 1 (SLP)    Improve Ability to Comprehend Questions Goal 1 (SLP) simple yes/no questions;independently (over 90% accuracy)  -MB simple yes/no questions;independently (over 90% accuracy)  -MB (r) MH (t) MB (c) simple yes/no questions;independently (over 90% accuracy)  -MB (r) MH (t) MB (c)    Time Frame (Comprehend Questions Goal 1, SLP) short term goal (STG);by discharge  -MB short term goal (STG);by discharge  -MB (r) MH (t) MB (c) short term goal (STG);by discharge  -MB (r) MH (t) MB (c)    Barriers (Comprehend Questions Goal 1, SLP) fatigue  -MB fatigue  -MB (r) MH (t) MB (c) fatigue  -MB (r) MH (t) MB (c)    Progress/Outcomes (Comprehend Questions Goal 1, SLP) goal not met  -MB goal ongoing  -MB (r) MH (t) MB (c) goal ongoing  -MB (r) MH (t) MB (c)       Follow Directions Goal 2 (SLP)    Improve Ability to Follow Directions Goal 1 (SLP) 2 step commands;independently (over 90% accuracy)  -MB 2 step commands;independently (over 90% accuracy)  -MB (r) MH (t) MB (c) 2 step commands;independently (over 90% accuracy)  -MB (r) MH (t) MB (c)    Time Frame (Follow Directions Goal 1, SLP) short term goal (STG);by discharge  -MB short term goal (STG);by discharge  -MB (r) TREVON (t) MB (c) short term goal (STG);by discharge  -MB (r)  MH (t) MB (c)    Barriers (Improve Ability to Follow Directions Goal 1, SLP) fatigue   -MB fatigue   -MB (r) MH (t) MB (c) fatigue   -MB (r) MH (t) MB (c)    Progress/Outcomes (Follow Directions Goal 1, SLP) goal not met  -MB goal ongoing  -MB (r) MH (t) MB (c) goal ongoing  -MB (r) MH (t) MB (c)       Word Retrieval Skills Goal 1 (SLP)    Improve Word Retrieval Skills By Goal 1 (SLP)  -- completing automatic speech task, days of the week;completing automatic speech task, months;repeating words  -MB (r) MH (t) MB (c) completing automatic speech task, days of the week;completing automatic speech task, months;repeating words  -MB (r) MH (t) MB (c)    Time Frame (Word Retrieval Goal 1, SLP)  -- short term goal (STG);by discharge  -MB (r) MH (t) MB (c) short term goal (STG);by discharge  -MB (r) MH (t) MB (c)    Barriers (Word Retrieval Goal 1, SLP)  -- fatigue   -MB (r) MH (t) MB (c) fatigue   -MB (r) MH (t) MB (c)    Progress (Word Retrieval Skills Goal 1, SLP)  --  -- with minimal cues (75-90%)  -MB (r) MH (t) MB (c)    Progress/Outcomes (Word Retrieval Goal 1, SLP)  -- goal ongoing  -MB (r) MH (t) MB (c) goal ongoing  -MB (r) MH (t) MB (c)       Ability to Construct Phrase and Sentence Level Response Goal 1 (SLP)    Improve Ability to Construct Phrase and Sentence Level Responses By Goal 1 (SLP) constructing a sentence with a key word;with minimal cues (75-90%)  -MB constructing a sentence with a key word;with minimal cues (75-90%)  -MB (r) MH (t) MB (c) constructing a sentence with a key word;with minimal cues (75-90%)  -MB (r) MH (t) MB (c)    Time Frame (Phrase and Sentence Level Response Goal 1, SLP) short term goal (STG);by discharge  -MB short term goal (STG);by discharge  -MB (r) MH (t) MB (c) short term goal (STG);by discharge  -MB (r) MH (t) MB (c)    Barriers (Phrase and Sentence Level Response Goal 1, SLP) fatigue  -MB fatigue  -MB (r) MH (t) MB (c) fatigue  -MB (r) MH (t) MB (c)    Progress (Construct  Phrase and Sentence Level Response Goal 1, SLP)  --  -- with minimal cues (75-90%)  -MB (r) MH (t) MB (c)    Progress/Outcomes (Phrase and Sentence Level Response Goal 1, SLP) goal partially met  -MB goal ongoing  -MB (r) MH (t) MB (c) goal ongoing  -MB (r) MH (t) MB (c)       Orientation Goal 1 (SLP)    Improve Orientation Through Goal 1 (SLP) demonstrating orientation to day;demonstrating orientation to month;demonstrating orientation to place;demonstrating orientation to year;with minimal cues (75-90%)  -MB demonstrating orientation to day;demonstrating orientation to month;demonstrating orientation to place;demonstrating orientation to year;with minimal cues (75-90%)  -MB (r) MH (t) MB (c) demonstrating orientation to day;demonstrating orientation to month;demonstrating orientation to place;demonstrating orientation to year;with minimal cues (75-90%)  -MB (r) MH (t) MB (c)    Time Frame (Orientation Goal 1, SLP) short term goal (STG);by discharge  -MB short term goal (STG);by discharge  -MB (r) MH (t) MB (c) short term goal (STG);by discharge  -MB (r) MH (t) MB (c)    Barriers (Orientation Goal 1, SLP) fatigue  -MB fatigue  -MB (r) MH (t) MB (c) fatigue  -MB (r) MH (t) MB (c)    Progress (Orientation Goal 1, SLP)  -- with 1:1 supervision/constant cues  -MB (r) MH (t) MB (c)  --    Progress/Outcomes (Orientation Goal 1, SLP) goal not met  -MB unable to make needed progress  -MB (r) MH (t) MB (c) unable to make needed progress  -MB (r) MH (t) MB (c)       Organizational Skills Goal 1 (SLP)    Improve Thought Organization Through Goal 1 (SLP) completing a divergent naming task;completing a convergent naming task;naming similarities and differences;with minimal cues (75-90%)  -MB completing a divergent naming task;completing a convergent naming task;naming similarities and differences;with minimal cues (75-90%)  -MB (r) MH (t) MB (c) completing a divergent naming task;completing a convergent naming task;naming  similarities and differences;with minimal cues (75-90%)  -MB (r) MH (t) MB (c)    Time Frame (Thought Organization Skills Goal 1, SLP) short term goal (STG);by discharge  -MB short term goal (STG);by discharge  -MB (r) MH (t) MB (c) short term goal (STG);by discharge  -MB (r) MH (t) MB (c)    Barriers (Thought Organization Skills Goal 1, SLP) fatigue  -MB fatigue  -MB (r) MH (t) MB (c) fatigue  -MB (r) MH (t) MB (c)    Progress (Thought Organization Skills Goal 1, SLP)  --  -- 0%  -MB (r) MH (t) MB (c)    Progress/Outcomes (Thought Organization Skills Goal 1, SLP) goal not met  -MB goal ongoing  -MB (r) MH (t) MB (c) unable to make needed progress  -MB (r) MH (t) MB (c)       Reasoning Goal 1 (SLP)    Improve Reasoning Through Goal 1 (SLP) complete basic reasoning task;complete analogies;identify absurdities;with minimal cues (75-90%)  -MB complete basic reasoning task;complete analogies;identify absurdities;with minimal cues (75-90%)  -MB (r) MH (t) MB (c) complete basic reasoning task;complete analogies;identify absurdities;with minimal cues (75-90%)  -MB (r) MH (t) MB (c)    Time Frame (Reasoning Goal 1, SLP) short term goal (STG);by discharge  -MB short term goal (STG);by discharge  -MB (r) MH (t) MB (c) short term goal (STG);by discharge  -MB (r) MH (t) MB (c)    Barriers (Reasoning Goal 1, SLP) fatigue  -MB fatigue  -MB (r) MH (t) MB (c) fatigue  -MB (r) MH (t) MB (c)    Progress/Outcomes (Reasoning Goal 1, SLP) goal not met  -MB goal ongoing  -MB (r) MH (t) MB (c) goal ongoing  -MB (r) MH (t) MB (c)       Functional Problem Solving Skills Goal 1 (SLP)    Improve Problem Solving Through Goal 1 (SLP) determine solutions to simple ADL/safety problems;with minimal cues (75-90%)  -MB determine solutions to simple ADL/safety problems;with minimal cues (75-90%)  -MB (r) MH (t) MB (c) determine solutions to simple ADL/safety problems;with minimal cues (75-90%)  -MB (r) MH (t) MB (c)    Time Frame (Problem Solving  Goal 1, SLP) short term goal (STG);by discharge  -MB short term goal (STG);by discharge  -MB (r) MH (t) MB (c) short term goal (STG);by discharge  -MB (r) MH (t) MB (c)    Barriers (Problem Solving Goal 1, SLP) fatigue  -MB fatigue  -MB (r) MH (t) MB (c) fatigue  -MB (r) MH (t) MB (c)    Progress/Outcomes (Problem Solving Goal 1, SLP) goal not met  -MB goal ongoing  -MB (r) MH (t) MB (c) goal ongoing  -MB (r) MH (t) MB (c)       Executive Functional Skills Goal 1 (SLP)    Improve Executive Function Skills Goal 1 (SLP) demonstrate awareness of deficit;organization/planning activity;with minimal cues (75-90%)  -MB demonstrate awareness of deficit;organization/planning activity;with minimal cues (75-90%)  -MB (r) MH (t) MB (c) demonstrate awareness of deficit;organization/planning activity;with minimal cues (75-90%)  -MB (r) MH (t) MB (c)    Time Frame (Executive Function Skills Goal 1, SLP) short term goal (STG);by discharge  -MB short term goal (STG);by discharge  -MB (r) MH (t) MB (c) short term goal (STG);by discharge  -MB (r) MH (t) MB (c)    Barriers (Executive Function Skills Goal 1, SLP) fatigue  -MB fatigue  -MB (r) MH (t) MB (c) fatigue  -MB (r) MH (t) MB (c)    Progress/Outcomes (Executive Function Skills Goal 1, SLP) goal not met  -MB goal ongoing  -MB (r) MH (t) MB (c) goal ongoing  -MB (r) MH (t) MB (c)      User Key  (r) = Recorded By, (t) = Taken By, (c) = Cosigned By    Initials Name Provider Type    MERRY Sanders, CCC-SLP Speech and Language Pathologist    TREVON Miller, Speech Therapy Student Speech Therapy Student                  SLP Discharge Summary  Anticipated Dischage Disposition: skilled nursing facility  Reason for Discharge: discharge from this facility  Progress Toward Achieving Short/long Term Goals: goals partially met within established timelines  Discharge Destination: home, other (see comments) (with hospice)      Jhonatan Sanders, SUNDEEP-SLP  6/13/2018
